# Patient Record
Sex: MALE | Race: WHITE | Employment: OTHER | URBAN - METROPOLITAN AREA
[De-identification: names, ages, dates, MRNs, and addresses within clinical notes are randomized per-mention and may not be internally consistent; named-entity substitution may affect disease eponyms.]

---

## 2017-01-19 DIAGNOSIS — I10 ESSENTIAL HYPERTENSION: ICD-10-CM

## 2017-01-19 RX ORDER — LISINOPRIL AND HYDROCHLOROTHIAZIDE 20; 12.5 MG/1; MG/1
1 TABLET ORAL DAILY
Qty: 90 TABLET | Refills: 1 | Status: SHIPPED | OUTPATIENT
Start: 2017-01-19 | End: 2017-03-28 | Stop reason: SDUPTHER

## 2017-02-13 ENCOUNTER — HOSPITAL ENCOUNTER (OUTPATIENT)
Age: 67
Discharge: HOME | End: 2017-02-13
Payer: COMMERCIAL

## 2017-02-13 PROCEDURE — 36415 COLL VENOUS BLD VENIPUNCTURE: CPT

## 2017-02-13 PROCEDURE — 84154 ASSAY OF PSA FREE: CPT

## 2017-02-14 LAB
PROSTATE SPECIFIC ANTIGEN FREE: 0.8 UG/L
PROSTATE SPECIFIC ANTIGEN PERCENT FREE: 15.4 %
PROSTATE SPECIFIC ANTIGEN: 5.2 UG/L (ref 0–4)

## 2017-02-17 ENCOUNTER — OFFICE VISIT (OUTPATIENT)
Dept: UROLOGY | Facility: CLINIC | Age: 67
End: 2017-02-17

## 2017-02-17 VITALS
HEART RATE: 95 BPM | TEMPERATURE: 98.2 F | BODY MASS INDEX: 29.65 KG/M2 | SYSTOLIC BLOOD PRESSURE: 169 MMHG | WEIGHT: 200.2 LBS | HEIGHT: 69 IN | DIASTOLIC BLOOD PRESSURE: 94 MMHG

## 2017-02-17 DIAGNOSIS — R97.20 BPH WITH ELEVATED PSA: ICD-10-CM

## 2017-02-17 DIAGNOSIS — N40.0 BPH WITH ELEVATED PSA: ICD-10-CM

## 2017-02-17 DIAGNOSIS — N52.9 ERECTILE DYSFUNCTION, UNSPECIFIED ERECTILE DYSFUNCTION TYPE: ICD-10-CM

## 2017-02-17 DIAGNOSIS — R97.20 ELEVATED PSA: Primary | ICD-10-CM

## 2017-02-17 PROCEDURE — 99214 OFFICE O/P EST MOD 30 MIN: CPT | Performed by: UROLOGY

## 2017-02-17 PROCEDURE — G8484 FLU IMMUNIZE NO ADMIN: HCPCS | Performed by: UROLOGY

## 2017-02-17 PROCEDURE — G8419 CALC BMI OUT NRM PARAM NOF/U: HCPCS | Performed by: UROLOGY

## 2017-02-17 PROCEDURE — 3017F COLORECTAL CA SCREEN DOC REV: CPT | Performed by: UROLOGY

## 2017-02-17 PROCEDURE — 1036F TOBACCO NON-USER: CPT | Performed by: UROLOGY

## 2017-02-17 PROCEDURE — G8427 DOCREV CUR MEDS BY ELIG CLIN: HCPCS | Performed by: UROLOGY

## 2017-02-17 ASSESSMENT — ENCOUNTER SYMPTOMS
COUGH: 0
ABDOMINAL PAIN: 0
COLOR CHANGE: 0
NAUSEA: 0
EYE REDNESS: 0
WHEEZING: 0
EYE PAIN: 0
BACK PAIN: 0
VOMITING: 0
SHORTNESS OF BREATH: 0

## 2017-03-28 ENCOUNTER — OFFICE VISIT (OUTPATIENT)
Dept: FAMILY MEDICINE CLINIC | Age: 67
End: 2017-03-28
Payer: COMMERCIAL

## 2017-03-28 VITALS
RESPIRATION RATE: 22 BRPM | WEIGHT: 194.2 LBS | TEMPERATURE: 98.2 F | BODY MASS INDEX: 28.68 KG/M2 | DIASTOLIC BLOOD PRESSURE: 86 MMHG | SYSTOLIC BLOOD PRESSURE: 136 MMHG | OXYGEN SATURATION: 98 % | HEART RATE: 68 BPM

## 2017-03-28 DIAGNOSIS — Z12.11 SCREENING FOR COLON CANCER: ICD-10-CM

## 2017-03-28 DIAGNOSIS — Z12.5 SCREENING FOR PROSTATE CANCER: ICD-10-CM

## 2017-03-28 DIAGNOSIS — I10 ESSENTIAL HYPERTENSION: Primary | ICD-10-CM

## 2017-03-28 DIAGNOSIS — Z13.220 SCREENING FOR LIPOID DISORDERS: ICD-10-CM

## 2017-03-28 PROCEDURE — G8427 DOCREV CUR MEDS BY ELIG CLIN: HCPCS | Performed by: NURSE PRACTITIONER

## 2017-03-28 PROCEDURE — 3017F COLORECTAL CA SCREEN DOC REV: CPT | Performed by: NURSE PRACTITIONER

## 2017-03-28 PROCEDURE — G8484 FLU IMMUNIZE NO ADMIN: HCPCS | Performed by: NURSE PRACTITIONER

## 2017-03-28 PROCEDURE — 1036F TOBACCO NON-USER: CPT | Performed by: NURSE PRACTITIONER

## 2017-03-28 PROCEDURE — G8419 CALC BMI OUT NRM PARAM NOF/U: HCPCS | Performed by: NURSE PRACTITIONER

## 2017-03-28 PROCEDURE — 99213 OFFICE O/P EST LOW 20 MIN: CPT | Performed by: NURSE PRACTITIONER

## 2017-03-28 RX ORDER — INGENOL MEBUTATE 150 UG/G
GEL TOPICAL
COMMUNITY
Start: 2017-03-14 | End: 2019-07-16

## 2017-03-28 RX ORDER — LISINOPRIL AND HYDROCHLOROTHIAZIDE 20; 12.5 MG/1; MG/1
1 TABLET ORAL DAILY
Qty: 90 TABLET | Refills: 1 | Status: SHIPPED | OUTPATIENT
Start: 2017-03-28 | End: 2017-10-18 | Stop reason: SDUPTHER

## 2017-03-28 ASSESSMENT — PATIENT HEALTH QUESTIONNAIRE - PHQ9
1. LITTLE INTEREST OR PLEASURE IN DOING THINGS: 0
SUM OF ALL RESPONSES TO PHQ9 QUESTIONS 1 & 2: 0
SUM OF ALL RESPONSES TO PHQ QUESTIONS 1-9: 0
2. FEELING DOWN, DEPRESSED OR HOPELESS: 0

## 2017-03-30 DIAGNOSIS — Z12.11 SCREENING FOR COLON CANCER: ICD-10-CM

## 2017-03-30 LAB
CONTROL: POSITIVE
HEMOCCULT STL QL: NORMAL

## 2017-03-30 PROCEDURE — 82274 ASSAY TEST FOR BLOOD FECAL: CPT | Performed by: NURSE PRACTITIONER

## 2017-04-01 ASSESSMENT — ENCOUNTER SYMPTOMS
NAUSEA: 0
SORE THROAT: 0
ABDOMINAL PAIN: 0
WHEEZING: 0
SHORTNESS OF BREATH: 0
ABDOMINAL DISTENTION: 0
EYE ITCHING: 0
CONSTIPATION: 0
CHEST TIGHTNESS: 0
COLOR CHANGE: 0
SINUS PRESSURE: 0
DIARRHEA: 0
EYE PAIN: 0
COUGH: 0
BLOOD IN STOOL: 0

## 2017-04-03 ENCOUNTER — HOSPITAL ENCOUNTER (OUTPATIENT)
Age: 67
Discharge: HOME | End: 2017-04-03
Payer: COMMERCIAL

## 2017-04-03 DIAGNOSIS — Z12.5 SCREENING FOR PROSTATE CANCER: ICD-10-CM

## 2017-04-03 DIAGNOSIS — I10 ESSENTIAL HYPERTENSION: ICD-10-CM

## 2017-04-03 DIAGNOSIS — Z13.220 SCREENING FOR LIPOID DISORDERS: ICD-10-CM

## 2017-04-03 LAB
ALBUMIN SERPL-MCNC: 4.1 G/DL (ref 3.5–5.2)
ALBUMIN/GLOBULIN RATIO: ABNORMAL (ref 1–2.5)
ALP BLD-CCNC: 57 U/L (ref 40–129)
ALT SERPL-CCNC: 24 U/L (ref 5–41)
ANION GAP SERPL CALCULATED.3IONS-SCNC: 12 MMOL/L (ref 9–17)
AST SERPL-CCNC: 21 U/L
BILIRUB SERPL-MCNC: 0.24 MG/DL (ref 0.3–1.2)
BUN BLDV-MCNC: 19 MG/DL (ref 8–23)
BUN/CREAT BLD: ABNORMAL (ref 9–20)
CALCIUM SERPL-MCNC: 9.1 MG/DL (ref 8.6–10.4)
CHLORIDE BLD-SCNC: 100 MMOL/L (ref 98–107)
CHOLESTEROL/HDL RATIO: 4.2
CHOLESTEROL: 191 MG/DL
CO2: 27 MMOL/L (ref 20–31)
CREAT SERPL-MCNC: 0.93 MG/DL (ref 0.7–1.2)
GFR AFRICAN AMERICAN: >60 ML/MIN
GFR NON-AFRICAN AMERICAN: >60 ML/MIN
GFR SERPL CREATININE-BSD FRML MDRD: ABNORMAL ML/MIN/{1.73_M2}
GFR SERPL CREATININE-BSD FRML MDRD: ABNORMAL ML/MIN/{1.73_M2}
GLUCOSE BLD-MCNC: 126 MG/DL (ref 70–99)
HDLC SERPL-MCNC: 46 MG/DL
LDL CHOLESTEROL: 133 MG/DL (ref 0–130)
POTASSIUM SERPL-SCNC: 4.2 MMOL/L (ref 3.7–5.3)
PROSTATE SPECIFIC ANTIGEN: 7.3 UG/L
SODIUM BLD-SCNC: 139 MMOL/L (ref 135–144)
TOTAL PROTEIN: 6.9 G/DL (ref 6.4–8.3)
TRIGL SERPL-MCNC: 61 MG/DL
VLDLC SERPL CALC-MCNC: ABNORMAL MG/DL (ref 1–30)

## 2017-04-03 PROCEDURE — 80053 COMPREHEN METABOLIC PANEL: CPT

## 2017-04-03 PROCEDURE — G0103 PSA SCREENING: HCPCS

## 2017-04-03 PROCEDURE — 80061 LIPID PANEL: CPT

## 2017-04-12 ENCOUNTER — NURSE ONLY (OUTPATIENT)
Dept: FAMILY MEDICINE CLINIC | Age: 67
End: 2017-04-12

## 2017-04-12 DIAGNOSIS — R73.01 ELEVATED FASTING GLUCOSE: Primary | ICD-10-CM

## 2017-04-12 LAB — HBA1C MFR BLD: 5.6 %

## 2017-06-26 ENCOUNTER — TELEPHONE (OUTPATIENT)
Dept: UROLOGY | Age: 67
End: 2017-06-26

## 2017-08-11 ENCOUNTER — TELEPHONE (OUTPATIENT)
Dept: UROLOGY | Age: 67
End: 2017-08-11

## 2017-08-11 ENCOUNTER — HOSPITAL ENCOUNTER (OUTPATIENT)
Age: 67
Discharge: HOME | End: 2017-08-11
Payer: COMMERCIAL

## 2017-08-11 DIAGNOSIS — R97.20 ELEVATED PSA: ICD-10-CM

## 2017-08-11 PROCEDURE — 36415 COLL VENOUS BLD VENIPUNCTURE: CPT

## 2017-08-11 PROCEDURE — 84154 ASSAY OF PSA FREE: CPT

## 2017-08-14 LAB
PROSTATE SPECIFIC ANTIGEN FREE: 0.6 UG/L
PROSTATE SPECIFIC ANTIGEN PERCENT FREE: 13.6 %
PROSTATE SPECIFIC ANTIGEN: 4.4 UG/L (ref 0–4)

## 2017-08-25 ENCOUNTER — OFFICE VISIT (OUTPATIENT)
Dept: UROLOGY | Age: 67
End: 2017-08-25
Payer: MEDICARE

## 2017-08-25 VITALS
HEART RATE: 60 BPM | BODY MASS INDEX: 26.22 KG/M2 | DIASTOLIC BLOOD PRESSURE: 83 MMHG | HEIGHT: 69 IN | SYSTOLIC BLOOD PRESSURE: 172 MMHG | WEIGHT: 177 LBS | TEMPERATURE: 98 F

## 2017-08-25 DIAGNOSIS — N40.1 HYPERTROPHY OF PROSTATE WITH URINARY OBSTRUCTION: ICD-10-CM

## 2017-08-25 DIAGNOSIS — N13.8 HYPERTROPHY OF PROSTATE WITH URINARY OBSTRUCTION: ICD-10-CM

## 2017-08-25 DIAGNOSIS — R97.20 ELEVATED PSA: Primary | ICD-10-CM

## 2017-08-25 PROCEDURE — G8427 DOCREV CUR MEDS BY ELIG CLIN: HCPCS | Performed by: UROLOGY

## 2017-08-25 PROCEDURE — 4040F PNEUMOC VAC/ADMIN/RCVD: CPT | Performed by: UROLOGY

## 2017-08-25 PROCEDURE — 3017F COLORECTAL CA SCREEN DOC REV: CPT | Performed by: UROLOGY

## 2017-08-25 PROCEDURE — 1036F TOBACCO NON-USER: CPT | Performed by: UROLOGY

## 2017-08-25 PROCEDURE — 99214 OFFICE O/P EST MOD 30 MIN: CPT | Performed by: UROLOGY

## 2017-08-25 PROCEDURE — G8419 CALC BMI OUT NRM PARAM NOF/U: HCPCS | Performed by: UROLOGY

## 2017-08-25 PROCEDURE — 1123F ACP DISCUSS/DSCN MKR DOCD: CPT | Performed by: UROLOGY

## 2017-08-25 RX ORDER — TAMSULOSIN HYDROCHLORIDE 0.4 MG/1
0.4 CAPSULE ORAL DAILY
Qty: 180 CAPSULE | Refills: 3 | Status: SHIPPED | OUTPATIENT
Start: 2017-08-25 | End: 2017-09-06 | Stop reason: SDUPTHER

## 2017-08-25 ASSESSMENT — ENCOUNTER SYMPTOMS
EYE PAIN: 0
BACK PAIN: 0
COUGH: 0
WHEEZING: 0
NAUSEA: 0
EYE REDNESS: 0
VOMITING: 0
COLOR CHANGE: 0
ABDOMINAL PAIN: 0
SHORTNESS OF BREATH: 0

## 2017-08-28 ENCOUNTER — OFFICE VISIT (OUTPATIENT)
Dept: FAMILY MEDICINE CLINIC | Age: 67
End: 2017-08-28
Payer: MEDICARE

## 2017-08-28 VITALS
HEART RATE: 58 BPM | BODY MASS INDEX: 27.19 KG/M2 | TEMPERATURE: 98.5 F | WEIGHT: 183.6 LBS | RESPIRATION RATE: 18 BRPM | DIASTOLIC BLOOD PRESSURE: 84 MMHG | SYSTOLIC BLOOD PRESSURE: 128 MMHG | HEIGHT: 69 IN | OXYGEN SATURATION: 97 %

## 2017-08-28 DIAGNOSIS — I10 ESSENTIAL HYPERTENSION: Primary | ICD-10-CM

## 2017-08-28 PROCEDURE — G8427 DOCREV CUR MEDS BY ELIG CLIN: HCPCS | Performed by: NURSE PRACTITIONER

## 2017-08-28 PROCEDURE — 4040F PNEUMOC VAC/ADMIN/RCVD: CPT | Performed by: NURSE PRACTITIONER

## 2017-08-28 PROCEDURE — G8419 CALC BMI OUT NRM PARAM NOF/U: HCPCS | Performed by: NURSE PRACTITIONER

## 2017-08-28 PROCEDURE — 3017F COLORECTAL CA SCREEN DOC REV: CPT | Performed by: NURSE PRACTITIONER

## 2017-08-28 PROCEDURE — 1123F ACP DISCUSS/DSCN MKR DOCD: CPT | Performed by: NURSE PRACTITIONER

## 2017-08-28 PROCEDURE — 99213 OFFICE O/P EST LOW 20 MIN: CPT | Performed by: NURSE PRACTITIONER

## 2017-08-28 PROCEDURE — 1036F TOBACCO NON-USER: CPT | Performed by: NURSE PRACTITIONER

## 2017-08-28 ASSESSMENT — ENCOUNTER SYMPTOMS
SINUS PRESSURE: 0
BLOOD IN STOOL: 0
WHEEZING: 0
ABDOMINAL DISTENTION: 0
NAUSEA: 0
SORE THROAT: 0
SHORTNESS OF BREATH: 0
EYE ITCHING: 0
DIARRHEA: 0
COLOR CHANGE: 0
CONSTIPATION: 0
EYE PAIN: 0
CHEST TIGHTNESS: 0
COUGH: 0
ABDOMINAL PAIN: 0

## 2017-08-28 ASSESSMENT — PATIENT HEALTH QUESTIONNAIRE - PHQ9
SUM OF ALL RESPONSES TO PHQ9 QUESTIONS 1 & 2: 0
SUM OF ALL RESPONSES TO PHQ QUESTIONS 1-9: 0
1. LITTLE INTEREST OR PLEASURE IN DOING THINGS: 0
2. FEELING DOWN, DEPRESSED OR HOPELESS: 0

## 2017-09-06 DIAGNOSIS — N40.1 BENIGN NON-NODULAR PROSTATIC HYPERPLASIA WITH LOWER URINARY TRACT SYMPTOMS: ICD-10-CM

## 2017-09-06 DIAGNOSIS — N13.8 HYPERTROPHY OF PROSTATE WITH URINARY OBSTRUCTION: ICD-10-CM

## 2017-09-06 DIAGNOSIS — N40.1 HYPERTROPHY OF PROSTATE WITH URINARY OBSTRUCTION: ICD-10-CM

## 2017-09-06 DIAGNOSIS — R35.1 NOCTURIA: ICD-10-CM

## 2017-09-06 RX ORDER — TAMSULOSIN HYDROCHLORIDE 0.4 MG/1
CAPSULE ORAL
Qty: 180 CAPSULE | Refills: 3 | Status: SHIPPED | OUTPATIENT
Start: 2017-09-06 | End: 2018-09-27 | Stop reason: SDUPTHER

## 2017-10-18 DIAGNOSIS — I10 ESSENTIAL HYPERTENSION: ICD-10-CM

## 2017-10-18 RX ORDER — LISINOPRIL AND HYDROCHLOROTHIAZIDE 20; 12.5 MG/1; MG/1
TABLET ORAL
Qty: 90 TABLET | Refills: 1 | Status: SHIPPED | OUTPATIENT
Start: 2017-10-18 | End: 2018-06-26 | Stop reason: SDUPTHER

## 2017-10-27 ENCOUNTER — OFFICE VISIT (OUTPATIENT)
Dept: FAMILY MEDICINE CLINIC | Age: 67
End: 2017-10-27
Payer: MEDICARE

## 2017-10-27 VITALS
HEIGHT: 69 IN | TEMPERATURE: 98.5 F | HEART RATE: 54 BPM | OXYGEN SATURATION: 98 % | SYSTOLIC BLOOD PRESSURE: 124 MMHG | BODY MASS INDEX: 27.28 KG/M2 | RESPIRATION RATE: 20 BRPM | DIASTOLIC BLOOD PRESSURE: 78 MMHG | WEIGHT: 184.2 LBS

## 2017-10-27 DIAGNOSIS — N52.9 ERECTILE DYSFUNCTION, UNSPECIFIED ERECTILE DYSFUNCTION TYPE: ICD-10-CM

## 2017-10-27 DIAGNOSIS — M79.601 ARM PAIN, RIGHT: Primary | ICD-10-CM

## 2017-10-27 PROCEDURE — 1123F ACP DISCUSS/DSCN MKR DOCD: CPT | Performed by: NURSE PRACTITIONER

## 2017-10-27 PROCEDURE — 99213 OFFICE O/P EST LOW 20 MIN: CPT | Performed by: NURSE PRACTITIONER

## 2017-10-27 PROCEDURE — 1036F TOBACCO NON-USER: CPT | Performed by: NURSE PRACTITIONER

## 2017-10-27 PROCEDURE — G8427 DOCREV CUR MEDS BY ELIG CLIN: HCPCS | Performed by: NURSE PRACTITIONER

## 2017-10-27 PROCEDURE — G8484 FLU IMMUNIZE NO ADMIN: HCPCS | Performed by: NURSE PRACTITIONER

## 2017-10-27 PROCEDURE — 3017F COLORECTAL CA SCREEN DOC REV: CPT | Performed by: NURSE PRACTITIONER

## 2017-10-27 PROCEDURE — 4040F PNEUMOC VAC/ADMIN/RCVD: CPT | Performed by: NURSE PRACTITIONER

## 2017-10-27 PROCEDURE — G8419 CALC BMI OUT NRM PARAM NOF/U: HCPCS | Performed by: NURSE PRACTITIONER

## 2017-10-27 RX ORDER — SILDENAFIL 100 MG/1
100 TABLET, FILM COATED ORAL PRN
Qty: 10 TABLET | Refills: 3 | Status: SHIPPED | OUTPATIENT
Start: 2017-10-27 | End: 2018-09-25 | Stop reason: SDUPTHER

## 2017-10-27 NOTE — PROGRESS NOTES
Visit Information    Have you changed or started any medications since your last visit including any over-the-counter medicines, vitamins, or herbal medicines? no   Are you having any side effects from any of your medications? -  no  Have you stopped taking any of your medications? Is so, why? -  no    Have you seen any other physician or provider since your last visit? No  Have you had any other diagnostic tests since your last visit? No  Have you been seen in the emergency room and/or had an admission to a hospital since we last saw you? No  Have you had your routine dental cleaning in the past 6 months? yes     Have you activated your [employer] account? If not, what are your barriers?  Yes     Patient Care Team:  Gal Valdez CNP as PCP - General        Health Maintenance   Topic Date Due    DTaP/Tdap/Td vaccine (1 - Tdap) 06/13/1971    Zostavax vaccine  06/13/2012    Pneumococcal low/med risk (1 of 2 - PCV13) 06/13/2017    Hepatitis C screen  08/28/2018 (Originally 1950)    HIV screen  08/28/2018 (Originally 6/13/1967)    Colon Cancer Screen FIT/FOBT  03/30/2018    Diabetes screen  04/12/2020    Lipid screen  04/03/2022

## 2017-10-29 ASSESSMENT — ENCOUNTER SYMPTOMS
BLOOD IN STOOL: 0
SHORTNESS OF BREATH: 0
COUGH: 0
WHEEZING: 0
ABDOMINAL PAIN: 0
EYE ITCHING: 0
COLOR CHANGE: 0
EYE PAIN: 0
DIARRHEA: 0
ABDOMINAL DISTENTION: 0
CHEST TIGHTNESS: 0
NAUSEA: 0
SORE THROAT: 0
CONSTIPATION: 0
SINUS PRESSURE: 0

## 2017-10-30 NOTE — PROGRESS NOTES
[facility], [address]  [address] [address]  Dept: [phone]  Dept Fax: [phone]    Isis Limon is a 74 y.o. male who presents today for his medical conditions/complaints as noted below. Isis Seen is c/o of Arm Pain (weakness in the right forearm. patient was moving a fridge on 10/26/2017 and felt a snap and pop in his right arm  )    Clark received counseling on the following healthy behaviors: nutrition, exercise and medication adherence  Reviewed prior labs and health maintenance. Continue current medications, diet and exercise. Discussed use, benefit, and side effects of prescribed medications. Barriers to medication compliance addressed. Patient given educational materials - see patient instructions. All patient questions answered. Patient voiced understanding. HPI:     Arm Pain    The incident occurred 2 days ago. The incident occurred at home. Injury mechanism: moving a refrigerator and felt a \"pop\" and pain. The pain is present in the right forearm. The quality of the pain is described as aching. The pain does not radiate. The pain is at a severity of 4/10. The pain is moderate. The pain has been constant since the incident. Pertinent negatives include no chest pain, muscle weakness, numbness or tingling. The symptoms are aggravated by movement and lifting. He has tried rest, ice and elevation for the symptoms. The treatment provided mild relief. Erectile Dysfunction   This is a chronic problem. The current episode started more than 1 month ago. The problem is unchanged. The nature of his difficulty is achieving erection and maintaining erection. He reports no anxiety, decreased libido or performance anxiety. He reports his erection duration to be less than 1 minute. Irritative symptoms do not include frequency, nocturia or urgency.  Obstructive symptoms do not include incomplete emptying or a weak stream. Pertinent negatives include no dysuria or genital pain. The symptoms are aggravated by medications. Past treatments include sildenafil. The treatment provided significant relief. He has been using treatment for 1 to 6 months. He has had no adverse reactions caused by medications. There are no known risk factors.        Hemoglobin A1C (%)   Date Value   04/12/2017 5.6             ( goal A1C is < 7)   No results found for: LABMICR  LDL Cholesterol (mg/dL)   Date Value   04/03/2017 133 (H)   11/03/2015 151 (H)   01/23/2013 155 (H)       (goal LDL is <100)   AST (U/L)   Date Value   04/03/2017 21     ALT (U/L)   Date Value   04/03/2017 24     BUN (mg/dL)   Date Value   04/03/2017 19     BP Readings from Last 3 Encounters:   10/27/17 124/78   08/28/17 128/84   08/25/17 (!) 172/83          (goal 120/80)    Past Medical History:   Diagnosis Date    Basal cell carcinoma     Erectile dysfunction     Hypertension     Precancerous skin lesion     Squamous cell carcinoma       Past Surgical History:   Procedure Laterality Date    MALIGNANT SKIN LESION EXCISION      MOHS SURGERY      PRE-MALIGNANT / BENIGN SKIN LESION EXCISION         Family History   Problem Relation Age of Onset    High Blood Pressure Mother     Diabetes Neg Hx        Social History   Substance Use Topics    Smoking status: Never Smoker    Smokeless tobacco: Never Used    Alcohol use 0.0 oz/week      Comment: socially      Current Outpatient Prescriptions   Medication Sig Dispense Refill    sildenafil (VIAGRA) 100 MG tablet Take 1 tablet by mouth as needed for Erectile Dysfunction 10 tablet 3    lisinopril-hydrochlorothiazide (PRINZIDE;ZESTORETIC) 20-12.5 MG per tablet TAKE 1 TABLET DAILY 90 tablet 1    tamsulosin (FLOMAX) 0.4 MG capsule Take 2 tablets daily 180 capsule 3    PICATO 0.015 % GEL       vitamin B-12 (CYANOCOBALAMIN) 1000 MCG tablet Take 1,000 mcg by mouth daily      Psyllium (METAMUCIL PO) Take by mouth      Bioflavonoid Products (KATHRYN-C) 500-200-60 MG TABS Take 1 tablet by mouth daily      Coenzyme Q10 (CO Q 10) 100 MG CAPS Take 1 capsule by mouth daily       No current facility-administered medications for this visit. Allergies   Allergen Reactions    Influenza Vaccines Swelling       Health Maintenance   Topic Date Due    DTaP/Tdap/Td vaccine (1 - Tdap) 06/13/1971    Zostavax vaccine  06/13/2012    Pneumococcal low/med risk (1 of 2 - PCV13) 06/13/2017    Hepatitis C screen  08/28/2018 (Originally 1950)    HIV screen  08/28/2018 (Originally 6/13/1967)    Colon Cancer Screen FIT/FOBT  03/30/2018    Diabetes screen  04/12/2020    Lipid screen  04/03/2022       Subjective:      Review of Systems   Constitutional: Negative for activity change and appetite change. HENT: Negative for congestion, ear pain, hearing loss, sinus pressure, sore throat and tinnitus. Eyes: Negative for pain, itching and visual disturbance. Respiratory: Negative for cough, chest tightness, shortness of breath and wheezing. Cardiovascular: Negative for chest pain, palpitations and leg swelling. Gastrointestinal: Negative for abdominal distention, abdominal pain, blood in stool, constipation, diarrhea and nausea. Endocrine: Negative for cold intolerance, heat intolerance, polydipsia, polyphagia and polyuria. Genitourinary: Negative for decreased libido, dysuria, flank pain, frequency, incomplete emptying, nocturia and urgency. Musculoskeletal: Positive for arthralgias (right arm pain). Negative for gait problem and myalgias. Skin: Negative for color change, rash and wound. Allergic/Immunologic: Negative for environmental allergies and food allergies. Neurological: Negative for tingling, speech difficulty, weakness, light-headedness, numbness and headaches. Hematological: Does not bruise/bleed easily. Psychiatric/Behavioral: Negative for decreased concentration and sleep disturbance. The patient is not nervous/anxious. Objective:     /78 (Site: Left Arm, Position: Sitting, Cuff Size: Medium Adult)   Pulse 54   Temp 98.5 °F (36.9 °C) (Temporal)   Resp 20   Ht 5' 9\" (1.753 m)   Wt 184 lb 3.2 oz (83.6 kg)   SpO2 98%   BMI 27.20 kg/m²   Physical Exam   Constitutional: He is oriented to person, place, and time. He appears well-developed and well-nourished. HENT:   Head: Normocephalic. Right Ear: External ear normal.   Left Ear: External ear normal.   Nose: Nose normal.   Mouth/Throat: Oropharynx is clear and moist.   Eyes: Conjunctivae and EOM are normal.   Neck: Normal range of motion. Neck supple. No thyromegaly present. Cardiovascular: Normal rate, regular rhythm and normal heart sounds. Exam reveals no gallop and no friction rub. No murmur heard. Pulmonary/Chest: Effort normal and breath sounds normal. No respiratory distress. He has no wheezes. He has no rales. He exhibits no tenderness. Abdominal: Soft. Bowel sounds are normal. He exhibits no distension. There is no splenomegaly or hepatomegaly. There is no tenderness. No hernia. Musculoskeletal: Normal range of motion. He exhibits no edema. Right forearm: He exhibits tenderness. Lymphadenopathy:     He has no cervical adenopathy. Neurological: He is alert and oriented to person, place, and time. Coordination and gait normal.   Skin: Skin is warm and dry. Bruising (right inner forearm) noted. No rash noted. No erythema. Psychiatric: He has a normal mood and affect. His speech is normal and behavior is normal. Judgment and thought content normal. Cognition and memory are normal.   Nursing note and vitals reviewed. Assessment:      1. Arm pain, right    2. Erectile dysfunction, unspecified erectile dysfunction type                     Plan:    Right arm pain:  Apply ice to right arm several times a day for 10-15 minutes at a time  Rest arm, do not overuse  Elevate arm on pillows when sitting. No heavy lifting.   If not better in

## 2018-02-21 ENCOUNTER — HOSPITAL ENCOUNTER (OUTPATIENT)
Age: 68
Discharge: HOME | End: 2018-02-21
Payer: MEDICARE

## 2018-02-21 LAB — PROSTATE SPECIFIC ANTIGEN: 6.74 UG/L

## 2018-02-21 PROCEDURE — 36415 COLL VENOUS BLD VENIPUNCTURE: CPT

## 2018-02-21 PROCEDURE — 84153 ASSAY OF PSA TOTAL: CPT

## 2018-02-27 ENCOUNTER — OFFICE VISIT (OUTPATIENT)
Dept: UROLOGY | Age: 68
End: 2018-02-27
Payer: MEDICARE

## 2018-02-27 VITALS
TEMPERATURE: 98 F | HEIGHT: 69 IN | DIASTOLIC BLOOD PRESSURE: 82 MMHG | SYSTOLIC BLOOD PRESSURE: 167 MMHG | WEIGHT: 189 LBS | BODY MASS INDEX: 27.99 KG/M2 | HEART RATE: 67 BPM

## 2018-02-27 DIAGNOSIS — N52.01 ERECTILE DYSFUNCTION DUE TO ARTERIAL INSUFFICIENCY: ICD-10-CM

## 2018-02-27 DIAGNOSIS — N40.1 BPH WITH OBSTRUCTION/LOWER URINARY TRACT SYMPTOMS: ICD-10-CM

## 2018-02-27 DIAGNOSIS — R97.20 ELEVATED PSA: Primary | ICD-10-CM

## 2018-02-27 DIAGNOSIS — N13.8 BPH WITH OBSTRUCTION/LOWER URINARY TRACT SYMPTOMS: ICD-10-CM

## 2018-02-27 PROCEDURE — 99214 OFFICE O/P EST MOD 30 MIN: CPT | Performed by: UROLOGY

## 2018-02-27 RX ORDER — TADALAFIL 20 MG/1
20 TABLET ORAL PRN
Qty: 6 TABLET | Refills: 3 | Status: SHIPPED | OUTPATIENT
Start: 2018-02-27 | End: 2018-09-25 | Stop reason: ALTCHOICE

## 2018-02-27 RX ORDER — TADALAFIL 20 MG/1
20 TABLET ORAL PRN
Qty: 6 TABLET | Refills: 3 | Status: SHIPPED | OUTPATIENT
Start: 2018-02-27 | End: 2018-02-27 | Stop reason: SDUPTHER

## 2018-02-27 ASSESSMENT — ENCOUNTER SYMPTOMS
COLOR CHANGE: 0
VOMITING: 0
SHORTNESS OF BREATH: 0
NAUSEA: 0
BACK PAIN: 0
COUGH: 0
WHEEZING: 0
EYE REDNESS: 0
EYE PAIN: 0
ABDOMINAL PAIN: 0

## 2018-02-27 NOTE — LETTER
[facility]  [facility] [place]  [address]  [address]  [address] [place] [address]  Dept: [phone]  Dept Fax: [phone]        2/27/18    Patient: Lauretta Oppenheim  YOB: 1950    Dear Juan C Qucah CNP,    I had the pleasure of seeing one of your patients, Elise Wren today in the office today. Below are the relevant portions of my assessment and plan of care. IMPRESSION:     1. Elevated PSA    2. BPH with obstruction/lower urinary tract symptoms        PLAN:        He is interested in an MRI. He needs that or a biopsy. Flomax BID is not working, he may need a cysto for Bladder outlet surgery  Return in about 6 weeks (around 4/10/2018). Prescriptions Ordered:  No orders of the defined types were placed in this encounter. Orders Placed:  Orders Placed This Encounter   Procedures    MRI PROSTATE W WO CONTRAST     Standing Status:   Future     Standing Expiration Date:   2/27/2019       Thank you for allowing me to participate in the care of this patient. I will keep you updated on this patient's follow up and I look forward to serving you and your patients again in the future.         Jak Galicia MD

## 2018-02-27 NOTE — PROGRESS NOTES
[facility]  [facility] [place]  [address]  [address]  [address] [facility] [address]  Dept: [address] [facility] Urology Office Note - Established    Patient:  Jose Alejandro Scott  YOB: 1950  Date: 2/27/2018    The patient is a 74 y.o. male who presents today for evaluation of the following problems:   Chief Complaint   Patient presents with    Elevated PSA     PSA 6 mo        HPI  Here for follow up on PSA. His BP is elevated today, he feels it is white coat syndrome- home /79. He has no known Hx of prostate cancer, but does have biological Hx on his father. He has no recent instrumentation, cath or colonoscopy. He has a weak stream, nocturia 2x, his urinary s/s have worsened in the last 6 months. He takes Flomax 2x per day, and noticed at first the second pill did help but now his s/s are back to baseline or worse. His PSA in Aug 0f 2017 was 4.4, his PSA 2/18 was 6.7. HIs PSA in 4/2017 7.30. We had discussed biopsy previously, but he was not interested. His PSA had gone down, and has now gone up again. Summary of old records: N/A    Additional History: N/A    Procedures Today: N/A    Urinalysis today:  No results found for this visit on 02/27/18. Last several PSA's:  Lab Results   Component Value Date    PSA 6.74 (H) 02/21/2018    PSA 4.4 (H) 08/11/2017    PSA 7.30 (H) 04/03/2017     Last total testosterone:  Lab Results   Component Value Date    TESTOSTERONE 437 09/18/2013       AUA Symptom Score (2/27/2018):   INCOMPLETE EMPTYING: How often have you had the sensation of not emptying your bladder?: Not at all  FREQUENCY: How often do you have to urinate less than every two hours?: Less than 1 to 5 times  INTERMITTENCY: How often have you found you stopped and started again several times when you urinated?: Not at all  URGENCY: How often have you found it difficult to postpone urination?: Not at all  WEAK STREAM: How often have you had a weak urinary stream?: Smokeless Tobacco    Never Used     (If patient a smoker, smoking cessation counseling offered)    History   Alcohol Use    0.0 oz/week     Comment: socially       REVIEW OF SYSTEMS:  Review of Systems   Constitutional: Negative for appetite change, chills and fever. Eyes: Negative for pain, redness and visual disturbance. Respiratory: Negative for cough, shortness of breath and wheezing. Cardiovascular: Negative for chest pain and leg swelling. Gastrointestinal: Negative for abdominal pain, nausea and vomiting. Genitourinary: Positive for difficulty urinating. Negative for discharge, dysuria, flank pain, frequency, hematuria, scrotal swelling, testicular pain and urgency. Musculoskeletal: Negative for back pain, joint swelling and myalgias. Skin: Negative for color change, rash and wound. Neurological: Negative for dizziness, tremors and numbness. Hematological: Negative for adenopathy. Does not bruise/bleed easily. Physical Exam:      Vitals:    02/27/18 0852   BP: (!) 167/82   Pulse:    Temp:      Body mass index is 27.91 kg/m². Patient is a 74 y.o. male in no acute distress and alert and oriented to person, place and time. Physical Exam  Constitutional: Patient in no acute distress. Neuro: Alert and oriented to person, place and time. Psych: Mood normal, affect normal  Skin: No rash noted  HEENT: Head: Normocephalic and atraumatic  Lungs: Respiratory effort is normal  Cardiovascular: Warm & Pink  Abdomen: Soft, non-tender, non-distended with no CVA,  No flank tenderness,  Or hepatosplenomegaly   Lymphatics: No palpable lymphadenopathy. Bladder non-tender and not distended. Musculoskeletal: Normal gait and station  Testiscles: Normal, bilaterally      Assessment and Plan      1. Elevated PSA    2. BPH with obstruction/lower urinary tract symptoms       PSA has worsened. Plan:         He is interested in an MRI. He needs that or a biopsy.    Flomax BID is not working, he may need a cysto for Bladder outlet surgery  Return in about 6 weeks (around 4/10/2018). Prescriptions Ordered:  No orders of the defined types were placed in this encounter.      Orders Placed:  Orders Placed This Encounter   Procedures    MRI PROSTATE W WO CONTRAST     Standing Status:   Future     Standing Expiration Date:   2/27/2019          Leonardo Blanco MD

## 2018-02-28 ENCOUNTER — OFFICE VISIT (OUTPATIENT)
Dept: FAMILY MEDICINE CLINIC | Age: 68
End: 2018-02-28
Payer: MEDICARE

## 2018-02-28 VITALS
HEIGHT: 69 IN | WEIGHT: 184.4 LBS | DIASTOLIC BLOOD PRESSURE: 76 MMHG | OXYGEN SATURATION: 97 % | HEART RATE: 62 BPM | BODY MASS INDEX: 27.31 KG/M2 | SYSTOLIC BLOOD PRESSURE: 146 MMHG | RESPIRATION RATE: 18 BRPM | TEMPERATURE: 98.5 F

## 2018-02-28 DIAGNOSIS — M25.561 ACUTE PAIN OF RIGHT KNEE: Primary | ICD-10-CM

## 2018-02-28 PROCEDURE — 99213 OFFICE O/P EST LOW 20 MIN: CPT | Performed by: NURSE PRACTITIONER

## 2018-03-01 ENCOUNTER — TELEPHONE (OUTPATIENT)
Dept: UROLOGY | Age: 68
End: 2018-03-01

## 2018-03-01 DIAGNOSIS — M23.306 MENISCUS DEGENERATION, RIGHT: Primary | ICD-10-CM

## 2018-03-01 RX ORDER — DEXAMETHASONE SODIUM PHOSPHATE 4 MG/ML
INJECTION, SOLUTION INTRA-ARTICULAR; INTRALESIONAL; INTRAMUSCULAR; INTRAVENOUS; SOFT TISSUE
Qty: 5 ML | Refills: 3 | Status: SHIPPED | OUTPATIENT
Start: 2018-03-01 | End: 2018-09-25 | Stop reason: ALTCHOICE

## 2018-03-01 NOTE — TELEPHONE ENCOUNTER
Deven from [employer] called and asked for Decadron for Iontophoresis. Kaitlin Villaseñor would like it sent to [facility] in [place].

## 2018-03-04 ASSESSMENT — ENCOUNTER SYMPTOMS
ABDOMINAL PAIN: 0
COLOR CHANGE: 0
SHORTNESS OF BREATH: 0
ABDOMINAL DISTENTION: 0
BLOOD IN STOOL: 0
CONSTIPATION: 0
SORE THROAT: 0
EYE PAIN: 0
EYE ITCHING: 0
SINUS PRESSURE: 0
DIARRHEA: 0
COUGH: 0
CHEST TIGHTNESS: 0
NAUSEA: 0
WHEEZING: 0

## 2018-03-05 NOTE — PROGRESS NOTES
for congestion, ear pain, hearing loss, sinus pressure, sore throat and tinnitus. Eyes: Negative for pain, itching and visual disturbance. Respiratory: Negative for cough, chest tightness, shortness of breath and wheezing. Cardiovascular: Negative for chest pain, palpitations and leg swelling. Gastrointestinal: Negative for abdominal distention, abdominal pain, blood in stool, constipation, diarrhea and nausea. Endocrine: Negative for cold intolerance, heat intolerance, polydipsia, polyphagia and polyuria. Genitourinary: Negative for dysuria, flank pain, frequency and urgency. Musculoskeletal: Positive for arthralgias (knee pain right) and gait problem (when knee starts hurting). Negative for myalgias. Skin: Negative for color change, rash and wound. Allergic/Immunologic: Negative for environmental allergies and food allergies. Neurological: Negative for speech difficulty, weakness, light-headedness and headaches. Hematological: Does not bruise/bleed easily. Psychiatric/Behavioral: Negative for decreased concentration and sleep disturbance. The patient is nervous/anxious (regarding prostate biopsy). Objective:     BP (!) 146/76   Pulse 62   Temp 98.5 °F (36.9 °C) (Temporal)   Resp 18   Ht 5' 9\" (1.753 m)   Wt 184 lb 6.4 oz (83.6 kg)   SpO2 97%   BMI 27.23 kg/m²   Physical Exam   Constitutional: He is oriented to person, place, and time. He appears well-developed and well-nourished. HENT:   Head: Normocephalic. Right Ear: External ear normal.   Left Ear: External ear normal.   Nose: Nose normal.   Mouth/Throat: Oropharynx is clear and moist.   Eyes: Conjunctivae and EOM are normal.   Neck: Normal range of motion. Neck supple. No thyromegaly present. Cardiovascular: Normal rate, regular rhythm and normal heart sounds. Exam reveals no gallop and no friction rub. No murmur heard. Pulmonary/Chest: Effort normal and breath sounds normal. No respiratory distress.  He has no wheezes. He has no rales. He exhibits no tenderness. Abdominal: Soft. Bowel sounds are normal. He exhibits no distension. There is no splenomegaly or hepatomegaly. There is no tenderness. No hernia. Musculoskeletal: Normal range of motion. He exhibits no edema. Right knee: He exhibits swelling. Tenderness found. Legs:  Lymphadenopathy:     He has no cervical adenopathy. Neurological: He is alert and oriented to person, place, and time. Coordination and gait normal.   Skin: Skin is warm and dry. No rash noted. No erythema. Psychiatric: He has a normal mood and affect. His speech is normal and behavior is normal. Judgment and thought content normal. Cognition and memory are normal.   Nursing note and vitals reviewed. Assessment:      1. Acute pain of right knee                     Plan:     Knee Pain:  Rest the knee as much as possible  Put cloth-covered ice pack on the knee for no more than 20 minutes at a time 4-8 times a day  Elevate knee so it is 12 inches above heart to reduce swelling  Use ace wrap to knee to help with swelling  Take ibuprofen for pain as directed  If pain becomes severe or swelling increases, RTO  Physical therapy as ordered. Orders Placed This Encounter   Procedures    Amb External Referral To Physical Therapy     Referral Priority:   Routine     Referral Type:   Consult for Advice and Opinion     Referral Reason:   Specialty Services Required     Referred to Provider:   Dilshad Fletcher     Requested Specialty:   Physical Therapy     Number of Visits Requested:   1     No orders of the defined types were placed in this encounter. Return in about 4 months (around 6/28/2018) for hypertension. Patient given educational materials - see patient instructions. Discussed use, benefit, and side effects of prescribed medications. All patient questions answered. Pt voiced understanding. Reviewed health maintenance.   Instructed to continue current

## 2018-03-12 NOTE — TELEPHONE ENCOUNTER
I spoke to  [facility] who ok'ed me to release the results, unfortunately I do not know how to release  To my chart . I called the pt and explained it to him and offered to mail the results to him . The Patient agreed.

## 2018-04-16 ENCOUNTER — TELEPHONE (OUTPATIENT)
Dept: UROLOGY | Age: 68
End: 2018-04-16

## 2018-06-27 ENCOUNTER — TELEPHONE (OUTPATIENT)
Dept: UROLOGY | Age: 68
End: 2018-06-27

## 2018-06-27 NOTE — TELEPHONE ENCOUNTER
Pt's future order for MRI had shown up as incompleted in the overdue results inbox. Previous call history was reviewed and awaited correspondence was never received. Call placed to [facility] and she states that a peer to peer request was faxed to [phone] which is the [address] fax, so this information was never received. I requested a copy of this fax and since this was dated back on 3/14/18 the time frame to complete peer to peer review has . This will be discussed with Dr Chioma Diggs and will proceed with plan of treatment per his orders.

## 2018-07-26 ENCOUNTER — TELEPHONE (OUTPATIENT)
Dept: UROLOGY | Age: 68
End: 2018-07-26

## 2018-07-26 NOTE — TELEPHONE ENCOUNTER
Patient called and made an appointment for Sept with Dr Kimberly Ventura. He is requesting a repeat PSA. States his MRI was not authorized and he would like to get PSA repeated. I advised we would call patient back.   Thank you

## 2018-09-18 DIAGNOSIS — R97.20 ELEVATED PSA: Primary | ICD-10-CM

## 2018-09-19 ENCOUNTER — HOSPITAL ENCOUNTER (OUTPATIENT)
Age: 68
Discharge: HOME | End: 2018-09-19
Payer: MEDICARE

## 2018-09-19 DIAGNOSIS — R97.20 ELEVATED PSA: ICD-10-CM

## 2018-09-19 LAB — PROSTATE SPECIFIC ANTIGEN: 6.5 UG/L

## 2018-09-19 PROCEDURE — 36415 COLL VENOUS BLD VENIPUNCTURE: CPT

## 2018-09-19 PROCEDURE — 84153 ASSAY OF PSA TOTAL: CPT

## 2018-09-25 ENCOUNTER — OFFICE VISIT (OUTPATIENT)
Dept: UROLOGY | Age: 68
End: 2018-09-25
Payer: MEDICARE

## 2018-09-25 VITALS
HEIGHT: 69 IN | BODY MASS INDEX: 27.99 KG/M2 | SYSTOLIC BLOOD PRESSURE: 152 MMHG | DIASTOLIC BLOOD PRESSURE: 88 MMHG | WEIGHT: 189 LBS | HEART RATE: 70 BPM | TEMPERATURE: 98.3 F

## 2018-09-25 DIAGNOSIS — N13.8 BPH WITH OBSTRUCTION/LOWER URINARY TRACT SYMPTOMS: ICD-10-CM

## 2018-09-25 DIAGNOSIS — N52.9 ERECTILE DYSFUNCTION, UNSPECIFIED ERECTILE DYSFUNCTION TYPE: ICD-10-CM

## 2018-09-25 DIAGNOSIS — N40.1 BPH WITH OBSTRUCTION/LOWER URINARY TRACT SYMPTOMS: ICD-10-CM

## 2018-09-25 DIAGNOSIS — R97.20 ELEVATED PSA: Primary | ICD-10-CM

## 2018-09-25 PROCEDURE — 99214 OFFICE O/P EST MOD 30 MIN: CPT | Performed by: UROLOGY

## 2018-09-25 RX ORDER — SILDENAFIL 100 MG/1
100 TABLET, FILM COATED ORAL PRN
Qty: 30 TABLET | Refills: 3 | Status: SHIPPED | OUTPATIENT
Start: 2018-09-25 | End: 2021-01-18

## 2018-09-25 RX ORDER — GUAIFENESIN/PHENYLPROPANOLAMIN
1 EXPECTORANT ORAL DAILY
COMMUNITY
End: 2019-11-19 | Stop reason: ALTCHOICE

## 2018-09-25 ASSESSMENT — ENCOUNTER SYMPTOMS
EYE REDNESS: 0
SHORTNESS OF BREATH: 0
COLOR CHANGE: 0
NAUSEA: 0
WHEEZING: 0
ABDOMINAL PAIN: 0
BACK PAIN: 0
VOMITING: 0
EYE PAIN: 0
COUGH: 0

## 2018-09-25 NOTE — PROGRESS NOTES
[facility]  [facility] [place]  [address]  [address]  [address] [facility] [address]  Dept: [address] [facility] Urology Office Note - Established    Patient:  Shyla Cortes  YOB: 1950  Date: 9/25/2018    The patient is a 79 y.o. male who presents today for evaluation of the following problems:   Chief Complaint   Patient presents with    Elevated PSA     f/u with repeat PSA        HPI  He is here for BPH and elevated PSA. He has some days when he does well. He does have nocturia. The stream is weak at times. He does not think he always empties his bladder. He has an elevated PSA. He was going to have an MRI, but it was denied. He has not had a biopsy. He has been on Flomax, which is working alright. From a voiding point of view, he is content. He takes Viagra as needed, which helps as well. Summary of old records: N/A    Additional History: N/A    Procedures Today: N/A    Urinalysis today:  No results found for this visit on 09/25/18. Last several PSA's:  Lab Results   Component Value Date    PSA 6.50 (H) 09/19/2018    PSA 6.74 (H) 02/21/2018    PSA 4.4 (H) 08/11/2017     Last total testosterone:  Lab Results   Component Value Date    TESTOSTERONE 437 09/18/2013       AUA Symptom Score (9/25/2018):                                Last BUN and creatinine:  Lab Results   Component Value Date    BUN 19 04/03/2017     Lab Results   Component Value Date    CREATININE 0.93 04/03/2017       Additional Lab/Culture results: none    Imaging Reviewed during this Office Visit: none  (results were independently reviewed by physician and radiology report verified)    PAST MEDICAL, FAMILY AND SOCIAL HISTORY UPDATE:  Past Medical History:   Diagnosis Date    Basal cell carcinoma     Erectile dysfunction     Hypertension     Precancerous skin lesion     Squamous cell carcinoma      Past Surgical History:   Procedure Laterality Date    MALIGNANT SKIN LESION EXCISION hepatosplenomegaly   Lymphatics: No palpable lymphadenopathy. Bladder non-tender and not distended. Musculoskeletal: Normal gait and station      Assessment and Plan      1. Elevated PSA    2. Erectile dysfunction, unspecified erectile dysfunction type    3. BPH with obstruction/lower urinary tract symptoms           Plan:         Overall content with voiding. Continue Flomax  Continue Viagra  Discussed options for PSA. Not interested in biopsy, MRI denied. F/U in 6 months with a repeat PSA. Return in about 6 months (around 3/25/2019) for Labs. Prescriptions Ordered:  Orders Placed This Encounter   Medications    sildenafil (VIAGRA) 100 MG tablet     Sig: Take 1 tablet by mouth as needed for Erectile Dysfunction     Dispense:  30 tablet     Refill:  3      Orders Placed:  Orders Placed This Encounter   Procedures    PSA, free     Standing Status:   Future     Standing Expiration Date:   9/26/2019    PSA, Diagnostic     Standing Status:   Future     Standing Expiration Date:   9/25/2019          Cuate Milligan MD    Agree with the ROS entered by the MA.

## 2018-10-22 DIAGNOSIS — I10 ESSENTIAL HYPERTENSION: ICD-10-CM

## 2018-10-22 RX ORDER — LISINOPRIL AND HYDROCHLOROTHIAZIDE 20; 12.5 MG/1; MG/1
TABLET ORAL
Qty: 90 TABLET | Refills: 0 | OUTPATIENT
Start: 2018-10-22

## 2018-10-25 ENCOUNTER — OFFICE VISIT (OUTPATIENT)
Dept: FAMILY MEDICINE CLINIC | Age: 68
End: 2018-10-25
Payer: MEDICARE

## 2018-10-25 VITALS
SYSTOLIC BLOOD PRESSURE: 132 MMHG | HEIGHT: 69 IN | DIASTOLIC BLOOD PRESSURE: 70 MMHG | RESPIRATION RATE: 20 BRPM | OXYGEN SATURATION: 98 % | BODY MASS INDEX: 27.61 KG/M2 | WEIGHT: 186.4 LBS | TEMPERATURE: 98.1 F | HEART RATE: 77 BPM

## 2018-10-25 DIAGNOSIS — M79.601 RIGHT ARM PAIN: ICD-10-CM

## 2018-10-25 DIAGNOSIS — I10 ESSENTIAL HYPERTENSION: Primary | ICD-10-CM

## 2018-10-25 DIAGNOSIS — J02.9 ACUTE PHARYNGITIS, UNSPECIFIED ETIOLOGY: ICD-10-CM

## 2018-10-25 PROCEDURE — 96372 THER/PROPH/DIAG INJ SC/IM: CPT | Performed by: NURSE PRACTITIONER

## 2018-10-25 PROCEDURE — 99214 OFFICE O/P EST MOD 30 MIN: CPT | Performed by: NURSE PRACTITIONER

## 2018-10-25 RX ORDER — DOXYCYCLINE HYCLATE 100 MG/1
100 CAPSULE ORAL 2 TIMES DAILY
Qty: 20 CAPSULE | Refills: 0 | Status: SHIPPED | OUTPATIENT
Start: 2018-10-25 | End: 2018-11-04

## 2018-10-25 RX ORDER — LISINOPRIL AND HYDROCHLOROTHIAZIDE 20; 12.5 MG/1; MG/1
TABLET ORAL
Qty: 90 TABLET | Refills: 1 | Status: SHIPPED | OUTPATIENT
Start: 2018-10-25 | End: 2018-11-26 | Stop reason: SINTOL

## 2018-10-25 RX ORDER — LINCOMYCIN HYDROCHLORIDE 300 MG/ML
600 INJECTION, SOLUTION INTRAMUSCULAR; INTRAVENOUS; SUBCONJUNCTIVAL ONCE
Status: COMPLETED | OUTPATIENT
Start: 2018-10-25 | End: 2018-10-25

## 2018-10-25 RX ADMIN — LINCOMYCIN HYDROCHLORIDE 600 MG: 300 INJECTION, SOLUTION INTRAMUSCULAR; INTRAVENOUS; SUBCONJUNCTIVAL at 13:52

## 2018-10-25 ASSESSMENT — PATIENT HEALTH QUESTIONNAIRE - PHQ9
SUM OF ALL RESPONSES TO PHQ9 QUESTIONS 1 & 2: 0
SUM OF ALL RESPONSES TO PHQ QUESTIONS 1-9: 0
2. FEELING DOWN, DEPRESSED OR HOPELESS: 0
1. LITTLE INTEREST OR PLEASURE IN DOING THINGS: 0
SUM OF ALL RESPONSES TO PHQ QUESTIONS 1-9: 0

## 2018-10-25 NOTE — PROGRESS NOTES
[address] [place] [address]  Dept: [phone]  Dept Fax: [phone]    Supriya Bacon is a 79 y.o. male who presents today for his medical conditions/complaints as noted below. Supriya Bacon is c/o of Sinusitis (sore throat, headache, cough, runny nose, sneezing, mild sore throat patient has tried otc medications 3-4 days ) and Medication Refill    Clark received counseling on the following healthy behaviors: nutrition, exercise and medication adherence  Reviewed prior labs and health maintenance. Continue current medications, diet and exercise. Discussed use, benefit, and side effects of prescribed medications. Barriers to medication compliance addressed. Patient given educational materials - see patient instructions. All patient questions answered. Patient voiced understanding. HPI:     Hypertension   This is a chronic problem. The current episode started more than 1 year ago. The problem has been gradually improving since onset. The problem is controlled. Associated symptoms include headaches. Pertinent negatives include no chest pain, palpitations or shortness of breath. There are no associated agents to hypertension. Risk factors for coronary artery disease include male gender. Past treatments include ACE inhibitors and diuretics. The current treatment provides moderate improvement. There are no compliance problems. There is no history of kidney disease, CAD/MI, CVA or heart failure. Pharyngitis   This is a new problem. The current episode started in the past 7 days. The problem occurs constantly. The problem has been gradually worsening. Associated symptoms include congestion, coughing, fatigue, headaches and a sore throat. Pertinent negatives include no abdominal pain, arthralgias, chest pain, myalgias, nausea, rash or weakness. The symptoms are aggravated by swallowing.  He has tried rest, drinking and NSAIDs for the exhibits no edema or tenderness. Lymphadenopathy:     He has no cervical adenopathy. Neurological: He is alert and oriented to person, place, and time. Coordination and gait normal.   Skin: Skin is warm and dry. No rash noted. No erythema. Psychiatric: He has a normal mood and affect. His speech is normal and behavior is normal. Judgment and thought content normal. Cognition and memory are normal.   Nursing note and vitals reviewed. Assessment:      1. Essential hypertension    2. Acute pharyngitis, unspecified etiology    3. Right arm pain                     Plan:     Hypertension:  Take medications exactly as prescribed  Medications: HCTZ and lisinopril  Take one low dose ASA (81mg) every night at bedtime  Check blood pressure at home  Stay at healthy weight  Exercise at least 30 minutes 3 times a week  Avoid or limit alcohol  Limit salt intake  Eat plenty of fruit and vegetables  Lower amount saturated fat in diet  Pharyngitis:  Take doxycycline as prescribed and until gone. Gargle with warm salt water 3-4 times a day   BR with hot water running and inhale steam 3 times a day  May use humidifier at bedside  Drink plenty of fluids every day (8-8 oz glasses)  Get plenty of rest   Right arm pain:    Do daily exercises but don't overdo. May take ibuprofen for discomfort  Call if decide to go to PT. No orders of the defined types were placed in this encounter. Orders Placed This Encounter   Medications    doxycycline hyclate (VIBRAMYCIN) 100 MG capsule     Sig: Take 1 capsule by mouth 2 times daily for 10 days     Dispense:  20 capsule     Refill:  0    lisinopril-hydrochlorothiazide (PRINZIDE;ZESTORETIC) 20-12.5 MG per tablet     Sig: TAKE 1 TABLET DAILY     Dispense:  90 tablet     Refill:  1    lincomycin (LINCOCIN) injection 600 mg        Return in about 6 months (around 4/25/2019). Patient given educational materials - see patient instructions.   Discussed use, benefit,

## 2018-10-31 ASSESSMENT — ENCOUNTER SYMPTOMS
NAUSEA: 0
EYE ITCHING: 0
CONSTIPATION: 0
VOICE CHANGE: 1
SORE THROAT: 1
COLOR CHANGE: 0
CHEST TIGHTNESS: 0
RHINORRHEA: 1
SHORTNESS OF BREATH: 0
BLOOD IN STOOL: 0
ABDOMINAL DISTENTION: 0
COUGH: 1
DIARRHEA: 0
ABDOMINAL PAIN: 0
WHEEZING: 0
EYE PAIN: 0

## 2018-11-26 ENCOUNTER — OFFICE VISIT (OUTPATIENT)
Dept: FAMILY MEDICINE CLINIC | Age: 68
End: 2018-11-26
Payer: MEDICARE

## 2018-11-26 VITALS
HEIGHT: 69 IN | BODY MASS INDEX: 28.32 KG/M2 | DIASTOLIC BLOOD PRESSURE: 82 MMHG | OXYGEN SATURATION: 98 % | HEART RATE: 66 BPM | SYSTOLIC BLOOD PRESSURE: 130 MMHG | RESPIRATION RATE: 20 BRPM | WEIGHT: 191.2 LBS | TEMPERATURE: 98.1 F

## 2018-11-26 DIAGNOSIS — I10 ESSENTIAL HYPERTENSION: Primary | ICD-10-CM

## 2018-11-26 DIAGNOSIS — M25.512 ACUTE PAIN OF LEFT SHOULDER: ICD-10-CM

## 2018-11-26 DIAGNOSIS — M79.601 PAIN OF RIGHT UPPER EXTREMITY: ICD-10-CM

## 2018-11-26 PROCEDURE — 99214 OFFICE O/P EST MOD 30 MIN: CPT | Performed by: NURSE PRACTITIONER

## 2018-11-26 RX ORDER — LOSARTAN POTASSIUM AND HYDROCHLOROTHIAZIDE 12.5; 5 MG/1; MG/1
1 TABLET ORAL DAILY
Qty: 90 TABLET | Refills: 1 | Status: SHIPPED | OUTPATIENT
Start: 2018-11-26 | End: 2019-06-22 | Stop reason: ALTCHOICE

## 2018-11-26 ASSESSMENT — PATIENT HEALTH QUESTIONNAIRE - PHQ9
1. LITTLE INTEREST OR PLEASURE IN DOING THINGS: 0
SUM OF ALL RESPONSES TO PHQ9 QUESTIONS 1 & 2: 0
2. FEELING DOWN, DEPRESSED OR HOPELESS: 0
SUM OF ALL RESPONSES TO PHQ QUESTIONS 1-9: 0
SUM OF ALL RESPONSES TO PHQ QUESTIONS 1-9: 0

## 2018-11-26 NOTE — PROGRESS NOTES
Erectile Dysfunction 30 tablet 3    PICATO 0.015 % GEL       vitamin B-12 (CYANOCOBALAMIN) 1000 MCG tablet Take 1,000 mcg by mouth daily      Psyllium (METAMUCIL PO) Take by mouth      Bioflavonoid Products (KATHRYN-C) 500-200-60 MG TABS Take 1 tablet by mouth daily      Coenzyme Q10 (CO Q 10) 100 MG CAPS Take 1 capsule by mouth daily       No current facility-administered medications for this visit. Allergies   Allergen Reactions    Influenza Vaccines Swelling       Health Maintenance   Topic Date Due    Hepatitis C screen  1950    DTaP/Tdap/Td vaccine (1 - Tdap) 06/13/1971    Shingles Vaccine (1 of 2 - 2 Dose Series) 06/13/2002    Pneumococcal low/med risk (1 of 2 - PCV13) 06/13/2017    Colon Cancer Screen FIT/FOBT  03/30/2018    Potassium monitoring  04/03/2018    Creatinine monitoring  04/03/2018    Diabetes screen  04/12/2020    Lipid screen  04/03/2022       Subjective:      Review of Systems   Constitutional: Negative for activity change, appetite change and malaise/fatigue. HENT: Negative for congestion, ear pain, hearing loss, sinus pressure, sore throat and tinnitus. Eyes: Negative for pain, itching and visual disturbance. Respiratory: Negative for cough, chest tightness, shortness of breath and wheezing. Cardiovascular: Negative for chest pain, palpitations and leg swelling. Gastrointestinal: Negative for abdominal distention, abdominal pain, blood in stool, constipation, diarrhea and nausea. Endocrine: Negative for cold intolerance, heat intolerance, polydipsia, polyphagia and polyuria. Genitourinary: Negative for dysuria, flank pain, frequency and urgency. Musculoskeletal: Positive for arthralgias (left shoulder ), myalgias (right arm) and stiffness. Negative for gait problem and gout. Skin: Negative for color change, rash and wound. Allergic/Immunologic: Negative for environmental allergies and food allergies.    Neurological: Negative for speech difficulty,

## 2018-12-02 ASSESSMENT — ENCOUNTER SYMPTOMS
COUGH: 0
CHEST TIGHTNESS: 0
ABDOMINAL PAIN: 0
SINUS PRESSURE: 0
EYE ITCHING: 0
EYE PAIN: 0
SHORTNESS OF BREATH: 0
WHEEZING: 0
ABDOMINAL DISTENTION: 0
CONSTIPATION: 0
NAUSEA: 0
COLOR CHANGE: 0
DIARRHEA: 0
SORE THROAT: 0
BLOOD IN STOOL: 0

## 2018-12-26 ENCOUNTER — HOSPITAL ENCOUNTER (OUTPATIENT)
Facility: CLINIC | Age: 68
Discharge: HOME | End: 2018-12-28
Payer: MEDICARE

## 2018-12-26 ENCOUNTER — HOSPITAL ENCOUNTER (OUTPATIENT)
Dept: CT IMAGING | Facility: CLINIC | Age: 68
Discharge: HOME | End: 2018-12-28
Payer: MEDICARE

## 2018-12-26 ENCOUNTER — HOSPITAL ENCOUNTER (OUTPATIENT)
Dept: GENERAL RADIOLOGY | Facility: CLINIC | Age: 68
Discharge: HOME | End: 2018-12-28
Payer: MEDICARE

## 2018-12-26 DIAGNOSIS — M25.512 CHRONIC LEFT SHOULDER PAIN: ICD-10-CM

## 2018-12-26 DIAGNOSIS — G89.29 CHRONIC LEFT SHOULDER PAIN: ICD-10-CM

## 2018-12-26 DIAGNOSIS — R29.898 RIGHT ARM WEAKNESS: ICD-10-CM

## 2018-12-26 DIAGNOSIS — M54.2 NECK PAIN: ICD-10-CM

## 2018-12-26 DIAGNOSIS — R20.2 PARESTHESIA: ICD-10-CM

## 2018-12-26 PROBLEM — M43.02 CERVICAL SPONDYLOLYSIS: Status: ACTIVE | Noted: 2018-12-26

## 2018-12-26 PROCEDURE — 73030 X-RAY EXAM OF SHOULDER: CPT

## 2018-12-26 PROCEDURE — 72125 CT NECK SPINE W/O DYE: CPT

## 2018-12-27 ENCOUNTER — HOSPITAL ENCOUNTER (OUTPATIENT)
Age: 68
Setting detail: SPECIMEN
Discharge: HOME | End: 2018-12-27
Payer: MEDICARE

## 2018-12-27 DIAGNOSIS — Z00.00 PREVENTATIVE HEALTH CARE: ICD-10-CM

## 2018-12-27 DIAGNOSIS — Z86.39 HISTORY OF HYPERGLYCEMIA: ICD-10-CM

## 2018-12-27 DIAGNOSIS — I10 ESSENTIAL HYPERTENSION: ICD-10-CM

## 2018-12-27 LAB
ABSOLUTE EOS #: 0.44 K/UL (ref 0–0.44)
ABSOLUTE IMMATURE GRANULOCYTE: 0.07 K/UL (ref 0–0.3)
ABSOLUTE LYMPH #: 1.94 K/UL (ref 1.1–3.7)
ABSOLUTE MONO #: 0.41 K/UL (ref 0.1–1.2)
ALBUMIN SERPL-MCNC: 4.1 G/DL (ref 3.5–5.2)
ALBUMIN/GLOBULIN RATIO: 1.5 (ref 1–2.5)
ALP BLD-CCNC: 48 U/L (ref 40–129)
ALT SERPL-CCNC: 32 U/L (ref 5–41)
ANION GAP SERPL CALCULATED.3IONS-SCNC: 9 MMOL/L (ref 9–17)
AST SERPL-CCNC: 25 U/L
BASOPHILS # BLD: 1 % (ref 0–2)
BASOPHILS ABSOLUTE: 0.07 K/UL (ref 0–0.2)
BILIRUB SERPL-MCNC: 0.39 MG/DL (ref 0.3–1.2)
BUN BLDV-MCNC: 22 MG/DL (ref 8–23)
BUN/CREAT BLD: ABNORMAL (ref 9–20)
CALCIUM SERPL-MCNC: 9.6 MG/DL (ref 8.6–10.4)
CHLORIDE BLD-SCNC: 104 MMOL/L (ref 98–107)
CHOLESTEROL/HDL RATIO: 4.8
CHOLESTEROL: 229 MG/DL
CO2: 28 MMOL/L (ref 20–31)
CREAT SERPL-MCNC: 0.94 MG/DL (ref 0.7–1.2)
DIFFERENTIAL TYPE: ABNORMAL
EOSINOPHILS RELATIVE PERCENT: 9 % (ref 1–4)
ESTIMATED AVERAGE GLUCOSE: 114 MG/DL
GFR AFRICAN AMERICAN: >60 ML/MIN
GFR NON-AFRICAN AMERICAN: >60 ML/MIN
GFR SERPL CREATININE-BSD FRML MDRD: ABNORMAL ML/MIN/{1.73_M2}
GFR SERPL CREATININE-BSD FRML MDRD: ABNORMAL ML/MIN/{1.73_M2}
GLUCOSE BLD-MCNC: 131 MG/DL (ref 70–99)
HBA1C MFR BLD: 5.6 % (ref 4–6)
HCT VFR BLD CALC: 48 % (ref 40.7–50.3)
HDLC SERPL-MCNC: 48 MG/DL
HEMOGLOBIN: 15.3 G/DL (ref 13–17)
IMMATURE GRANULOCYTES: 1 %
LDL CHOLESTEROL: 155 MG/DL (ref 0–130)
LYMPHOCYTES # BLD: 37 % (ref 24–43)
MCH RBC QN AUTO: 27.4 PG (ref 25.2–33.5)
MCHC RBC AUTO-ENTMCNC: 31.9 G/DL (ref 28.4–34.8)
MCV RBC AUTO: 85.9 FL (ref 82.6–102.9)
MONOCYTES # BLD: 8 % (ref 3–12)
NRBC AUTOMATED: 0 PER 100 WBC
PDW BLD-RTO: 14.3 % (ref 11.8–14.4)
PLATELET # BLD: 142 K/UL (ref 138–453)
PLATELET ESTIMATE: ABNORMAL
PMV BLD AUTO: 11.2 FL (ref 8.1–13.5)
POTASSIUM SERPL-SCNC: 4.1 MMOL/L (ref 3.7–5.3)
RBC # BLD: 5.59 M/UL (ref 4.21–5.77)
RBC # BLD: ABNORMAL 10*6/UL
SEG NEUTROPHILS: 44 % (ref 36–65)
SEGMENTED NEUTROPHILS ABSOLUTE COUNT: 2.27 K/UL (ref 1.5–8.1)
SODIUM BLD-SCNC: 141 MMOL/L (ref 135–144)
TOTAL PROTEIN: 6.8 G/DL (ref 6.4–8.3)
TRIGL SERPL-MCNC: 131 MG/DL
VLDLC SERPL CALC-MCNC: ABNORMAL MG/DL (ref 1–30)
WBC # BLD: 5.2 K/UL (ref 3.5–11.3)
WBC # BLD: ABNORMAL 10*3/UL

## 2019-01-02 ENCOUNTER — HOSPITAL ENCOUNTER (OUTPATIENT)
Dept: PHYSICAL THERAPY | Age: 69
Setting detail: THERAPIES SERIES
Discharge: HOME | End: 2019-01-02
Payer: MEDICARE

## 2019-01-02 PROCEDURE — G8984 CARRY CURRENT STATUS: HCPCS

## 2019-01-02 PROCEDURE — G8985 CARRY GOAL STATUS: HCPCS

## 2019-01-02 PROCEDURE — G0283 ELEC STIM OTHER THAN WOUND: HCPCS

## 2019-01-02 PROCEDURE — 97162 PT EVAL MOD COMPLEX 30 MIN: CPT

## 2019-01-02 ASSESSMENT — PAIN DESCRIPTION - LOCATION: LOCATION: SHOULDER

## 2019-01-02 ASSESSMENT — PAIN DESCRIPTION - FREQUENCY: FREQUENCY: CONTINUOUS

## 2019-01-02 ASSESSMENT — PAIN DESCRIPTION - ORIENTATION: ORIENTATION: RIGHT

## 2019-01-02 ASSESSMENT — PAIN DESCRIPTION - DESCRIPTORS: DESCRIPTORS: STABBING;TINGLING

## 2019-01-02 ASSESSMENT — PAIN SCALES - GENERAL: PAINLEVEL_OUTOF10: 6

## 2019-01-02 ASSESSMENT — PAIN DESCRIPTION - PROGRESSION: CLINICAL_PROGRESSION: OTHER (COMMENT)

## 2019-01-02 ASSESSMENT — PAIN DESCRIPTION - PAIN TYPE: TYPE: ACUTE PAIN

## 2019-01-02 ASSESSMENT — PAIN DESCRIPTION - ONSET: ONSET: UNABLE TO TELL

## 2019-01-07 ENCOUNTER — HOSPITAL ENCOUNTER (OUTPATIENT)
Dept: PHYSICAL THERAPY | Age: 69
Setting detail: THERAPIES SERIES
Discharge: HOME | End: 2019-01-07
Payer: MEDICARE

## 2019-01-08 ENCOUNTER — OFFICE VISIT (OUTPATIENT)
Dept: ORTHOPEDIC SURGERY | Age: 69
End: 2019-01-08
Payer: MEDICARE

## 2019-01-08 DIAGNOSIS — M79.601 RIGHT ARM PAIN: ICD-10-CM

## 2019-01-08 DIAGNOSIS — M54.2 NECK PAIN: Primary | ICD-10-CM

## 2019-01-08 DIAGNOSIS — M54.12 CERVICAL RADICULAR PAIN: ICD-10-CM

## 2019-01-08 PROCEDURE — 99203 OFFICE O/P NEW LOW 30 MIN: CPT | Performed by: ORTHOPAEDIC SURGERY

## 2019-01-09 ENCOUNTER — APPOINTMENT (OUTPATIENT)
Dept: PHYSICAL THERAPY | Age: 69
End: 2019-01-09
Payer: MEDICARE

## 2019-01-11 ENCOUNTER — APPOINTMENT (OUTPATIENT)
Dept: PHYSICAL THERAPY | Age: 69
End: 2019-01-11
Payer: MEDICARE

## 2019-01-14 ENCOUNTER — APPOINTMENT (OUTPATIENT)
Dept: PHYSICAL THERAPY | Age: 69
End: 2019-01-14
Payer: MEDICARE

## 2019-01-16 ENCOUNTER — APPOINTMENT (OUTPATIENT)
Dept: PHYSICAL THERAPY | Age: 69
End: 2019-01-16
Payer: MEDICARE

## 2019-01-18 ENCOUNTER — APPOINTMENT (OUTPATIENT)
Dept: PHYSICAL THERAPY | Age: 69
End: 2019-01-18
Payer: MEDICARE

## 2019-01-18 ENCOUNTER — HOSPITAL ENCOUNTER (OUTPATIENT)
Dept: MRI IMAGING | Facility: CLINIC | Age: 69
Discharge: HOME | End: 2019-01-20
Payer: MEDICARE

## 2019-01-18 DIAGNOSIS — M79.601 RIGHT ARM PAIN: ICD-10-CM

## 2019-01-18 DIAGNOSIS — M54.12 CERVICAL RADICULAR PAIN: ICD-10-CM

## 2019-01-18 DIAGNOSIS — M54.2 NECK PAIN: ICD-10-CM

## 2019-01-18 PROCEDURE — 72141 MRI NECK SPINE W/O DYE: CPT

## 2019-01-21 ENCOUNTER — APPOINTMENT (OUTPATIENT)
Dept: PHYSICAL THERAPY | Age: 69
End: 2019-01-21
Payer: MEDICARE

## 2019-01-23 ENCOUNTER — APPOINTMENT (OUTPATIENT)
Dept: PHYSICAL THERAPY | Age: 69
End: 2019-01-23
Payer: MEDICARE

## 2019-01-24 ENCOUNTER — OFFICE VISIT (OUTPATIENT)
Dept: ORTHOPEDIC SURGERY | Age: 69
End: 2019-01-24
Payer: MEDICARE

## 2019-01-24 DIAGNOSIS — M48.02 SPINAL STENOSIS IN CERVICAL REGION: ICD-10-CM

## 2019-01-24 DIAGNOSIS — M79.601 RIGHT ARM PAIN: Primary | ICD-10-CM

## 2019-01-24 DIAGNOSIS — M43.02 CERVICAL SPONDYLOLYSIS: ICD-10-CM

## 2019-01-24 DIAGNOSIS — M54.2 NECK PAIN: ICD-10-CM

## 2019-01-24 PROCEDURE — 99213 OFFICE O/P EST LOW 20 MIN: CPT | Performed by: ORTHOPAEDIC SURGERY

## 2019-03-21 ENCOUNTER — HOSPITAL ENCOUNTER (OUTPATIENT)
Age: 69
Setting detail: SPECIMEN
Discharge: HOME | End: 2019-03-21
Payer: MEDICARE

## 2019-03-21 DIAGNOSIS — R97.20 ELEVATED PSA: ICD-10-CM

## 2019-03-21 LAB
PROSTATE SPECIFIC ANTIGEN FREE: 0.9 UG/L
PROSTATE SPECIFIC ANTIGEN PERCENT FREE: 14.8 %
PROSTATE SPECIFIC ANTIGEN: 6.1 UG/L (ref 0–4)
PROSTATE SPECIFIC ANTIGEN: 6.64 UG/L

## 2019-03-26 ENCOUNTER — OFFICE VISIT (OUTPATIENT)
Dept: UROLOGY | Age: 69
End: 2019-03-26
Payer: MEDICARE

## 2019-03-26 VITALS
HEIGHT: 69 IN | WEIGHT: 192.02 LBS | DIASTOLIC BLOOD PRESSURE: 80 MMHG | BODY MASS INDEX: 28.44 KG/M2 | SYSTOLIC BLOOD PRESSURE: 159 MMHG | HEART RATE: 78 BPM

## 2019-03-26 DIAGNOSIS — N40.1 BPH WITH OBSTRUCTION/LOWER URINARY TRACT SYMPTOMS: ICD-10-CM

## 2019-03-26 DIAGNOSIS — N52.01 ERECTILE DYSFUNCTION DUE TO ARTERIAL INSUFFICIENCY: ICD-10-CM

## 2019-03-26 DIAGNOSIS — R97.20 ELEVATED PSA: Primary | ICD-10-CM

## 2019-03-26 DIAGNOSIS — N13.8 BPH WITH OBSTRUCTION/LOWER URINARY TRACT SYMPTOMS: ICD-10-CM

## 2019-03-26 DIAGNOSIS — R35.1 NOCTURIA: ICD-10-CM

## 2019-03-26 PROCEDURE — 99214 OFFICE O/P EST MOD 30 MIN: CPT | Performed by: UROLOGY

## 2019-03-26 ASSESSMENT — ENCOUNTER SYMPTOMS
ALLERGIC/IMMUNOLOGIC NEGATIVE: 1
GASTROINTESTINAL NEGATIVE: 1
EYES NEGATIVE: 1
RESPIRATORY NEGATIVE: 1

## 2019-04-11 ENCOUNTER — OFFICE VISIT (OUTPATIENT)
Dept: UROLOGY | Age: 69
End: 2019-04-11
Payer: MEDICARE

## 2019-04-11 VITALS
TEMPERATURE: 97.6 F | HEIGHT: 69 IN | DIASTOLIC BLOOD PRESSURE: 90 MMHG | WEIGHT: 189.2 LBS | BODY MASS INDEX: 28.02 KG/M2 | SYSTOLIC BLOOD PRESSURE: 140 MMHG

## 2019-04-11 DIAGNOSIS — R97.20 ELEVATED PSA: Primary | ICD-10-CM

## 2019-04-11 PROCEDURE — 99213 OFFICE O/P EST LOW 20 MIN: CPT | Performed by: NURSE PRACTITIONER

## 2019-04-11 ASSESSMENT — ENCOUNTER SYMPTOMS
EYE REDNESS: 0
COUGH: 0
SHORTNESS OF BREATH: 0
ABDOMINAL PAIN: 0
NAUSEA: 0
EYE PAIN: 0
COLOR CHANGE: 0
WHEEZING: 0
BACK PAIN: 1
VOMITING: 0

## 2019-04-11 NOTE — PROGRESS NOTES
[facility]  [facility] [place]  [address]  [address]  [address] [facility] [address]  Dept: [address] [facility] Office Note - Established    Patient:  Farheen Griffiths  YOB: 1950  Date: 4/11/2019    The patient is a 79 y.o. male who presents todayfor evaluation of the following problems:   Chief Complaint   Patient presents with    Elevated PSA     select MDX test        HPI  Her for MDX testing for elevated PSA, last 6.64. He prefers to avoid BX if possible. No family Hx of prostate cancer. Summary of old records: N/A    Additional History: N/A    Procedures Today: prostate massage per testing guideline and urine collection    Urinalysis today:  No results found for this visit on 04/11/19.   Last several PSA's:  Lab Results   Component Value Date    PSA 6.64 (H) 03/21/2019    PSA 6.1 (H) 03/21/2019    PSA 6.50 (H) 09/19/2018     Last total testosterone:  Lab Results   Component Value Date    TESTOSTERONE 437 09/18/2013       AUA Symptom Score (4/11/2019):  INCOMPLETE EMPTYING: How often have you had the sensation of not emptying your bladder?: Less than 1 to 5 times  FREQUENCY: How often do you have to urinate less than every two hours?: Less than 1 to 5 times  INTERMITTENCY: How often have you found you stopped and started again several times when you urinated?: Not at all  URGENCY: How often have you found it difficult to postpone urination?: Not at all  WEAK STREAM: How often have you had a weak urinary stream?: Less than 1 to 5 times  STRAINING: How often have you had to strain to start  urination?: Not at all  NOCTURIA: How many times did you typically get up at night to uriniate?: 1 Time  TOTAL I-PSS SCORE[de-identified] 4  How would you feel if you were to spend the rest of your life with your urinary condition?: Mostly Satisfied    Last BUN and creatinine:  Lab Results   Component Value Date    BUN 22 12/27/2018     Lab Results   Component Value Date CREATININE 0.94 12/27/2018       Additional Lab/Culture results:   Imaging Reviewed during this Office Visit:   (results were independently reviewed by physician and radiology report verified)    PAST MEDICAL, FAMILY AND SOCIAL HISTORY UPDATE:  Past Medical History:   Diagnosis Date    Basal cell carcinoma     Erectile dysfunction     Hypertension     Precancerous skin lesion     Squamous cell carcinoma      Past Surgical History:   Procedure Laterality Date    MALIGNANT SKIN LESION EXCISION      MOHS SURGERY      PRE-MALIGNANT / BENIGN SKIN LESION EXCISION       Family History   Problem Relation Age of Onset    High Blood Pressure Mother     Diabetes Neg Hx      Outpatient Medications Marked as Taking for the 4/11/19 encounter (Office Visit) with TARA Roa CNP   Medication Sig Dispense Refill    naproxen (NAPROSYN) 500 MG tablet TAKE 1 TABLET TWICE A DAY WITH MEALS 30 tablet 0    predniSONE (DELTASONE) 20 MG tablet Take 3 tabs daily for three days, take 2 tabs daily for next three days and then 1 tab daily  for next three days 18 tablet 0    chlorzoxazone (PARAFON FORTE) 500 MG tablet Take 1 tablet by mouth 3 times daily as needed for Muscle spasms 30 tablet 0    losartan-hydrochlorothiazide (HYZAAR) 50-12.5 MG per tablet Take 1 tablet by mouth daily 90 tablet 1    tamsulosin (FLOMAX) 0.4 MG capsule TAKE 2 CAPSULES DAILY 180 capsule 3    Saw Palmetto 500 MG CAPS Take 1 capsule by mouth daily      Glucosamine-Chondroitin (OSTEO BI-FLEX REGULAR STRENGTH PO) Take 1 tablet by mouth daily      Cholecalciferol (VITAMIN D3) 1000 units CAPS Take 1 capsule by mouth daily      sildenafil (VIAGRA) 100 MG tablet Take 1 tablet by mouth as needed for Erectile Dysfunction 30 tablet 3    PICATO 0.015 % GEL       vitamin B-12 (CYANOCOBALAMIN) 1000 MCG tablet Take 1,000 mcg by mouth daily      Psyllium (METAMUCIL PO) Take by mouth      Bioflavonoid Products (KATHRYN-C) 500-200-60 MG TABS Take 1 tablet by mouth daily      Coenzyme Q10 (CO Q 10) 100 MG CAPS Take 1 capsule by mouth daily         Influenza vaccines  Social History     Tobacco Use   Smoking Status Never Smoker   Smokeless Tobacco Never Used     (Ifpatient a smoker, smoking cessation counseling offered)    Social History     Substance and Sexual Activity   Alcohol Use Yes    Alcohol/week: 0.0 oz    Comment: socially       REVIEW OF SYSTEMS:  Review of Systems    Physical Exam:      Vitals:    04/11/19 0755   BP: (!) 140/90   Temp: 97.6 °F (36.4 °C)     Body mass index is 27.94 kg/m². Patient is a 79 y.o. male in no acute distress and alert and oriented to person, place and time. Physical Exam  Constitutional: Patient in no acute distress. Neuro: Alert and oriented to person, place and time. Psych: Mood normal, affect normal  Skin: No rash noted  HEENT: Head: Normocephalic andatraumatic  Conjunctivae and EOM are normal. Pupils are equal, round  Nose:Normal  Right External Ear: Normal; Left External Ear: Normal  Mouth: Mucosa Moist  Neck: Supple  Lungs: Respiratory effort is normal  Cardiovascular: Warm & Pink  Abdomen: Soft,  non-distended   Bladder not distended. Musculoskeletal: Normal gait and station  Prostate: no induration or nodule    Assessment and Plan      1. Elevated PSA           Plan:     follow up with Dr Angela Reyna for review of testing results  No follow-ups on file. Prescriptions Ordered:  No orders of the defined types were placed in this encounter. Orders Placed:  No orders of the defined types were placed in this encounter. TARA John CNP    Agree with the ROS entered by the MA.

## 2019-04-11 NOTE — PROGRESS NOTES
Review of Systems   Constitutional: Negative for appetite change, chills and fever. Eyes: Negative for pain, redness and visual disturbance. Respiratory: Negative for cough, shortness of breath and wheezing. Cardiovascular: Negative for chest pain and leg swelling. Gastrointestinal: Negative for abdominal pain, nausea and vomiting. Genitourinary: Negative for difficulty urinating, discharge, dysuria, flank pain, frequency, hematuria, scrotal swelling, testicular pain and urgency. Musculoskeletal: Positive for back pain (neck ). Negative for joint swelling and myalgias. Skin: Negative for color change, rash and wound. Neurological: Negative for dizziness, tremors and numbness. Hematological: Negative for adenopathy. Does not bruise/bleed easily.

## 2019-04-11 NOTE — LETTER
[facility]  [facility] [place]  [address]  [address]  [address] [place] [address]  Dept: [phone]  Dept Fax: [phone]        4/11/19    Patient: Luan Morrison  YOB: 1950    Dear TARA Leal CNP,    I had the pleasure of seeing one of your patients, Jasvir Newton today in the office today. Below are the relevant portions of my assessment and plan of care. IMPRESSION:     1. Elevated PSA        PLAN:   follow up with Dr Angela Reyna for review of testing results  No follow-ups on file. Prescriptions Ordered:  No orders of the defined types were placed in this encounter. Orders Placed:  No orders of the defined types were placed in this encounter. Thank you for allowing me to participate in the care of this patient. I will keep you updated on this patient's follow up and I look forward to serving you and your patients again in the future.     TARA John CNP

## 2019-05-14 ENCOUNTER — OFFICE VISIT (OUTPATIENT)
Dept: UROLOGY | Age: 69
End: 2019-05-14
Payer: MEDICARE

## 2019-05-14 VITALS
WEIGHT: 194.4 LBS | DIASTOLIC BLOOD PRESSURE: 98 MMHG | BODY MASS INDEX: 28.79 KG/M2 | SYSTOLIC BLOOD PRESSURE: 158 MMHG | TEMPERATURE: 98.3 F | HEIGHT: 69 IN

## 2019-05-14 DIAGNOSIS — N40.1 BPH WITH OBSTRUCTION/LOWER URINARY TRACT SYMPTOMS: ICD-10-CM

## 2019-05-14 DIAGNOSIS — N13.8 BPH WITH OBSTRUCTION/LOWER URINARY TRACT SYMPTOMS: ICD-10-CM

## 2019-05-14 DIAGNOSIS — R97.20 ELEVATED PSA: Primary | ICD-10-CM

## 2019-05-14 DIAGNOSIS — N52.01 ERECTILE DYSFUNCTION DUE TO ARTERIAL INSUFFICIENCY: ICD-10-CM

## 2019-05-14 PROCEDURE — 99214 OFFICE O/P EST MOD 30 MIN: CPT | Performed by: UROLOGY

## 2019-05-14 ASSESSMENT — ENCOUNTER SYMPTOMS
COLOR CHANGE: 0
COUGH: 0
NAUSEA: 0
EYE REDNESS: 0
WHEEZING: 0
EYE PAIN: 0
VOMITING: 0
SHORTNESS OF BREATH: 0
ABDOMINAL PAIN: 0
BACK PAIN: 0

## 2019-05-14 NOTE — PROGRESS NOTES
[facility]  [facility] [place]  [address]  [address]  [address] [facility] [address]  Dept: [address] [facility] Urology Office Note - Established    Patient:  Deep Fernandez  YOB: 1950  Date: 5/14/2019    The patient is a 79 y.o. male who presents todayfor evaluation of the following problems:   Chief Complaint   Patient presents with    Elevated PSA     MDX results         HPI  He is here in follow up for BPH and elevated PSA. He is doing much better with Flomax. He has an elevated PSA, but does not want a biopsy. His Select MDx was very low risk for clinically significant cancer. He has been very happy with Flomax and is tolerating it well. He was waking up 2-3 times per night. He is now down to 0-1. He is using Viagra, 100mg which works and he is tolerating it well. Summary of old records: N/A    Additional History: N/A    Procedures Today: N/A    Urinalysis today:  No results found for this visit on 05/14/19.   Last several PSA's:  Lab Results   Component Value Date    PSA 6.64 (H) 03/21/2019    PSA 6.1 (H) 03/21/2019    PSA 6.50 (H) 09/19/2018     Last total testosterone:  Lab Results   Component Value Date    TESTOSTERONE 437 09/18/2013       AUA Symptom Score (5/14/2019):  INCOMPLETE EMPTYING: How often have you had the sensation of not emptying your bladder?: Not at all  FREQUENCY: How often do you have to urinate less than every two hours?: Not at all  INTERMITTENCY: How often have you found you stopped and started again several times when you urinated?: Not at all  URGENCY: How often have you found it difficult to postpone urination?: Not at all  WEAK STREAM: How often have you had a weak urinary stream?: Not at all  STRAINING: How often have you had to strain to start  urination?: Not at all  NOCTURIA: How many times did you typically get up at night to uriniate?: NONE  TOTAL I-PSS SCORE[de-identified] 0  How would you feel if you were to spend the rest of your life with your urinary condition?: Pleased    Last BUN and creatinine:  Lab Results   Component Value Date    BUN 22 12/27/2018     Lab Results   Component Value Date    CREATININE 0.94 12/27/2018       Additional Lab/Culture results: Select MDx reviewed.      Imaging Reviewed during this Office Visit: none  (results were independently reviewed by physician and radiology report verified)    PAST MEDICAL, FAMILY AND SOCIAL HISTORY UPDATE:  Past Medical History:   Diagnosis Date    Basal cell carcinoma     Erectile dysfunction     Hypertension     Precancerous skin lesion     Squamous cell carcinoma      Past Surgical History:   Procedure Laterality Date    MALIGNANT SKIN LESION EXCISION      MOHS SURGERY      PRE-MALIGNANT / BENIGN SKIN LESION EXCISION       Family History   Problem Relation Age of Onset    High Blood Pressure Mother     Diabetes Neg Hx      Outpatient Medications Marked as Taking for the 5/14/19 encounter (Office Visit) with Nelida Neumann MD   Medication Sig Dispense Refill    meloxicam (MOBIC) 15 MG tablet Take 1 tablet by mouth daily 90 tablet 1    diclofenac sodium 1 % GEL Apply 2 g topically 2 times daily 1 Tube 3    predniSONE (DELTASONE) 20 MG tablet Take 3 tabs daily for three days, take 2 tabs daily for next three days and then 1 tab daily  for next three days 18 tablet 0    diclofenac (FLECTOR) 1.3 % patch Place 1 patch onto the skin 2 times daily 2 patch 0    naproxen (NAPROSYN) 500 MG tablet TAKE 1 TABLET TWICE A DAY WITH MEALS 30 tablet 0    losartan-hydrochlorothiazide (HYZAAR) 50-12.5 MG per tablet Take 1 tablet by mouth daily 90 tablet 1    tamsulosin (FLOMAX) 0.4 MG capsule TAKE 2 CAPSULES DAILY 180 capsule 3    Saw Palmetto 500 MG CAPS Take 1 capsule by mouth daily      Glucosamine-Chondroitin (OSTEO BI-FLEX REGULAR STRENGTH PO) Take 1 tablet by mouth daily      Cholecalciferol (VITAMIN D3) 1000 units CAPS Take 1 capsule by mouth daily      sildenafil (VIAGRA) 100 MG tablet Take 1 tablet by mouth as needed for Erectile Dysfunction 30 tablet 3    PICATO 0.015 % GEL       vitamin B-12 (CYANOCOBALAMIN) 1000 MCG tablet Take 1,000 mcg by mouth daily      Psyllium (METAMUCIL PO) Take by mouth      Bioflavonoid Products (KATHRYN-C) 500-200-60 MG TABS Take 1 tablet by mouth daily      Coenzyme Q10 (CO Q 10) 100 MG CAPS Take 1 capsule by mouth daily         Influenza vaccines  Social History     Tobacco Use   Smoking Status Never Smoker   Smokeless Tobacco Never Used     (Ifpatient a smoker, smoking cessation counseling offered)    Social History     Substance and Sexual Activity   Alcohol Use Yes    Alcohol/week: 0.0 oz    Comment: socially       REVIEW OF SYSTEMS:  Review of Systems    Physical Exam:      Vitals:    05/14/19 0842   BP: (!) 158/98   Temp: 98.3 °F (36.8 °C)     Body mass index is 28.71 kg/m². Patient is a 79 y.o. male in no acute distress and alert and oriented to person, place and time. Physical Exam  Constitutional: Patient in no acute distress. Neuro: Alert and oriented to person, place and time. Psych: Mood normal, affect normal  Skin: No rash noted  Lungs: Respiratory effort is normal  Cardiovascular: Warm & Pink  Abdomen: Soft, non-tender, non-distended with no CVA,  No flank tenderness,  Or hepatosplenomegaly   Lymphatics: No palpablelymphadenopathy. Bladder non-tender and not distended. Musculoskeletal: Normal gait and station      Assessment and Plan      1. Elevated PSA    2. BPH with obstruction/lower urinary tract symptoms    3. Erectile dysfunction due to arterial insufficiency           Plan:          Doing well overall. Voiding much improved with Flomax. Continue Viagra, tolerating well. Select MDx very low risk for clinically sig prostate cancer. Will hold off on biopsy for now. PSA in 6 months. Return in about 6 months (around 11/14/2019).     Prescriptions Ordered:  No orders of the defined types were placed in this

## 2019-05-14 NOTE — PROGRESS NOTES
Review of Systems   Constitutional: Negative for appetite change, chills and fever. Eyes: Negative for pain, redness and visual disturbance. Respiratory: Negative for cough, shortness of breath and wheezing. Cardiovascular: Negative for chest pain and leg swelling. Gastrointestinal: Negative for abdominal pain, nausea and vomiting. Genitourinary: Negative for difficulty urinating, discharge, dysuria, flank pain, frequency, hematuria, scrotal swelling, testicular pain and urgency. Musculoskeletal: Positive for joint swelling (shoulder ) and myalgias. Negative for back pain. Skin: Negative for color change, rash and wound. Neurological: Negative for dizziness, tremors and numbness. Hematological: Negative for adenopathy. Does not bruise/bleed easily.

## 2019-05-14 NOTE — LETTER
[facility]  [facility] [place]  [address]  [address]  [address] [facility] [address]  Dept: [phone]  Dept Fax: [phone]        5/14/19    Patient: Jameson Lira  YOB: 1950    Dear TARA Kent - CNP,    I had the pleasure of seeing one of your patients, Naveed Humphrey today in the office today. Below are the relevant portions of my assessment and plan of care. IMPRESSION:     1. Elevated PSA    2. BPH with obstruction/lower urinary tract symptoms    3. Erectile dysfunction due to arterial insufficiency        PLAN:        Doing well overall. Voiding much improved with Flomax. Continue Viagra, tolerating well. Select MDx very low risk for clinically sig prostate cancer. Will hold off on biopsy for now. PSA in 6 months. Return in about 6 months (around 11/14/2019). Prescriptions Ordered:  No orders of the defined types were placed in this encounter. Orders Placed:  Orders Placed This Encounter   Procedures    PSA, Diagnostic     Standing Status:   Future     Standing Expiration Date:   5/13/2020        Thank you for allowing me to participate in the care of this patient. I will keep you updated on this patient's follow up and I look forward to serving you and your patients again in the future.         Maia Banuelos MD

## 2019-08-04 NOTE — PROGRESS NOTES
Orders per Dr. Anselmo Ramsey prior to 9/25/18 appointment. X- cover  Called to patient bedside to discuss with family about care so far.  Explained that we are holding sedation and trying to let her wake up.  Will take time. Noted to be  In A fib with RVR.  Will plan on increasing her metoprolol to 50 BID. Will give 5 mg IV push now.     Critical care time 30 minutes.  Kiko Man MD

## 2019-08-07 ENCOUNTER — HOSPITAL ENCOUNTER (OUTPATIENT)
Dept: GENERAL RADIOLOGY | Facility: CLINIC | Age: 69
Discharge: HOME | End: 2019-08-09
Payer: MEDICARE

## 2019-08-07 ENCOUNTER — HOSPITAL ENCOUNTER (OUTPATIENT)
Age: 69
Discharge: HOME | End: 2019-08-09
Payer: MEDICARE

## 2019-08-07 DIAGNOSIS — R29.898 LEFT ARM WEAKNESS: ICD-10-CM

## 2019-08-07 DIAGNOSIS — M25.522 LEFT ELBOW PAIN: ICD-10-CM

## 2019-08-07 PROCEDURE — 73080 X-RAY EXAM OF ELBOW: CPT

## 2019-08-07 PROCEDURE — 73060 X-RAY EXAM OF HUMERUS: CPT

## 2019-08-08 ENCOUNTER — OFFICE VISIT (OUTPATIENT)
Dept: ORTHOPEDIC SURGERY | Age: 69
End: 2019-08-08
Payer: MEDICARE

## 2019-08-08 VITALS — BODY MASS INDEX: 27.85 KG/M2 | HEIGHT: 69 IN | WEIGHT: 188 LBS

## 2019-08-08 DIAGNOSIS — S59.902A INJURY OF LEFT ELBOW, INITIAL ENCOUNTER: Primary | ICD-10-CM

## 2019-08-08 PROCEDURE — 99203 OFFICE O/P NEW LOW 30 MIN: CPT | Performed by: PHYSICIAN ASSISTANT

## 2019-08-08 NOTE — LETTER
[address]  [address] [facility] [address]  Phone: [phone]  Fax: [phone]    Araceli Ocasio        August 8, 2019     Patient: Holly Orozco   YOB: 1950   Date of Visit: 8/8/2019       To Whom It May Concern: It is my medical opinion that Rossy Mari should remain out of work until 8/29/19 and we will re-evaluate restrictions after MRI. If you have any questions or concerns, please don't hesitate to call.     Sincerely,          RASHEED Ocasio

## 2019-08-14 ENCOUNTER — HOSPITAL ENCOUNTER (OUTPATIENT)
Dept: MRI IMAGING | Facility: CLINIC | Age: 69
Discharge: HOME | End: 2019-08-16
Payer: MEDICARE

## 2019-08-14 DIAGNOSIS — S59.902A INJURY OF LEFT ELBOW, INITIAL ENCOUNTER: ICD-10-CM

## 2019-08-14 PROCEDURE — 73221 MRI JOINT UPR EXTREM W/O DYE: CPT

## 2019-08-16 ENCOUNTER — OFFICE VISIT (OUTPATIENT)
Dept: ORTHOPEDIC SURGERY | Age: 69
End: 2019-08-16
Payer: MEDICARE

## 2019-08-16 DIAGNOSIS — S46.212A RUPTURE OF LEFT DISTAL BICEPS TENDON, INITIAL ENCOUNTER: Primary | ICD-10-CM

## 2019-08-16 PROCEDURE — 99213 OFFICE O/P EST LOW 20 MIN: CPT | Performed by: ORTHOPAEDIC SURGERY

## 2019-09-19 ENCOUNTER — TELEPHONE (OUTPATIENT)
Dept: UROLOGY | Age: 69
End: 2019-09-19

## 2019-10-24 DIAGNOSIS — N13.8 HYPERTROPHY OF PROSTATE WITH URINARY OBSTRUCTION: ICD-10-CM

## 2019-10-24 DIAGNOSIS — N40.1 HYPERTROPHY OF PROSTATE WITH URINARY OBSTRUCTION: ICD-10-CM

## 2019-10-25 RX ORDER — TAMSULOSIN HYDROCHLORIDE 0.4 MG/1
CAPSULE ORAL
Qty: 180 CAPSULE | Refills: 3 | Status: SHIPPED | OUTPATIENT
Start: 2019-10-25 | End: 2020-10-27

## 2019-11-12 ENCOUNTER — HOSPITAL ENCOUNTER (OUTPATIENT)
Age: 69
Setting detail: SPECIMEN
Discharge: HOME | End: 2019-11-12
Payer: MEDICARE

## 2019-11-12 DIAGNOSIS — R97.20 ELEVATED PSA: ICD-10-CM

## 2019-11-12 LAB — PROSTATE SPECIFIC ANTIGEN: 6.93 UG/L

## 2019-11-19 ENCOUNTER — OFFICE VISIT (OUTPATIENT)
Dept: UROLOGY | Age: 69
End: 2019-11-19
Payer: MEDICARE

## 2019-11-19 VITALS
HEART RATE: 79 BPM | DIASTOLIC BLOOD PRESSURE: 84 MMHG | SYSTOLIC BLOOD PRESSURE: 160 MMHG | BODY MASS INDEX: 28.14 KG/M2 | HEIGHT: 69 IN | TEMPERATURE: 98.3 F | WEIGHT: 190 LBS

## 2019-11-19 DIAGNOSIS — R97.20 ELEVATED PSA: ICD-10-CM

## 2019-11-19 DIAGNOSIS — N52.01 ERECTILE DYSFUNCTION DUE TO ARTERIAL INSUFFICIENCY: ICD-10-CM

## 2019-11-19 DIAGNOSIS — N40.1 BPH WITH OBSTRUCTION/LOWER URINARY TRACT SYMPTOMS: Primary | ICD-10-CM

## 2019-11-19 DIAGNOSIS — N13.8 BPH WITH OBSTRUCTION/LOWER URINARY TRACT SYMPTOMS: Primary | ICD-10-CM

## 2019-11-19 PROCEDURE — 99214 OFFICE O/P EST MOD 30 MIN: CPT | Performed by: UROLOGY

## 2019-11-19 RX ORDER — TETRACYCLINE HCL 500 MG
1 CAPSULE ORAL DAILY
COMMUNITY
End: 2020-05-19

## 2019-11-19 ASSESSMENT — ENCOUNTER SYMPTOMS
WHEEZING: 0
VOMITING: 0
NAUSEA: 0
BACK PAIN: 0
EYE REDNESS: 0
SHORTNESS OF BREATH: 0
COUGH: 0
COLOR CHANGE: 0
EYE PAIN: 0
ABDOMINAL PAIN: 0

## 2019-12-17 ENCOUNTER — HOSPITAL ENCOUNTER (OUTPATIENT)
Age: 69
Setting detail: SPECIMEN
Discharge: HOME | End: 2019-12-17
Payer: MEDICARE

## 2019-12-17 DIAGNOSIS — Z11.59 NEED FOR HEPATITIS C SCREENING TEST: ICD-10-CM

## 2019-12-17 DIAGNOSIS — I10 ESSENTIAL HYPERTENSION: ICD-10-CM

## 2019-12-17 LAB
ABSOLUTE EOS #: 0.32 K/UL (ref 0–0.44)
ABSOLUTE IMMATURE GRANULOCYTE: 0.05 K/UL (ref 0–0.3)
ABSOLUTE LYMPH #: 1.82 K/UL (ref 1.1–3.7)
ABSOLUTE MONO #: 0.36 K/UL (ref 0.1–1.2)
ALBUMIN SERPL-MCNC: 4.5 G/DL (ref 3.5–5.2)
ALBUMIN/GLOBULIN RATIO: 1.7 (ref 1–2.5)
ALP BLD-CCNC: 46 U/L (ref 40–129)
ALT SERPL-CCNC: 21 U/L (ref 5–41)
ANION GAP SERPL CALCULATED.3IONS-SCNC: 14 MMOL/L (ref 9–17)
AST SERPL-CCNC: 20 U/L
BASOPHILS # BLD: 1 % (ref 0–2)
BASOPHILS ABSOLUTE: 0.07 K/UL (ref 0–0.2)
BILIRUB SERPL-MCNC: 0.54 MG/DL (ref 0.3–1.2)
BUN BLDV-MCNC: 16 MG/DL (ref 8–23)
BUN/CREAT BLD: ABNORMAL (ref 9–20)
CALCIUM SERPL-MCNC: 9.7 MG/DL (ref 8.6–10.4)
CHLORIDE BLD-SCNC: 105 MMOL/L (ref 98–107)
CHOLESTEROL/HDL RATIO: 4.7
CHOLESTEROL: 225 MG/DL
CO2: 27 MMOL/L (ref 20–31)
CREAT SERPL-MCNC: 0.8 MG/DL (ref 0.7–1.2)
DIFFERENTIAL TYPE: ABNORMAL
EOSINOPHILS RELATIVE PERCENT: 6 % (ref 1–4)
GFR AFRICAN AMERICAN: >60 ML/MIN
GFR NON-AFRICAN AMERICAN: >60 ML/MIN
GFR SERPL CREATININE-BSD FRML MDRD: ABNORMAL ML/MIN/{1.73_M2}
GFR SERPL CREATININE-BSD FRML MDRD: ABNORMAL ML/MIN/{1.73_M2}
GLUCOSE BLD-MCNC: 125 MG/DL (ref 70–99)
HCT VFR BLD CALC: 46.9 % (ref 40.7–50.3)
HDLC SERPL-MCNC: 48 MG/DL
HEMOGLOBIN: 15.2 G/DL (ref 13–17)
HEPATITIS C ANTIBODY: NONREACTIVE
IMMATURE GRANULOCYTES: 1 %
LDL CHOLESTEROL: 143 MG/DL (ref 0–130)
LYMPHOCYTES # BLD: 34 % (ref 24–43)
MCH RBC QN AUTO: 28.3 PG (ref 25.2–33.5)
MCHC RBC AUTO-ENTMCNC: 32.4 G/DL (ref 28.4–34.8)
MCV RBC AUTO: 87.3 FL (ref 82.6–102.9)
MONOCYTES # BLD: 7 % (ref 3–12)
NRBC AUTOMATED: 0 PER 100 WBC
PDW BLD-RTO: 14.4 % (ref 11.8–14.4)
PLATELET # BLD: 153 K/UL (ref 138–453)
PLATELET ESTIMATE: ABNORMAL
PMV BLD AUTO: 11.6 FL (ref 8.1–13.5)
POTASSIUM SERPL-SCNC: 4.5 MMOL/L (ref 3.7–5.3)
RBC # BLD: 5.37 M/UL (ref 4.21–5.77)
RBC # BLD: ABNORMAL 10*6/UL
SEG NEUTROPHILS: 51 % (ref 36–65)
SEGMENTED NEUTROPHILS ABSOLUTE COUNT: 2.76 K/UL (ref 1.5–8.1)
SODIUM BLD-SCNC: 146 MMOL/L (ref 135–144)
TOTAL PROTEIN: 7.1 G/DL (ref 6.4–8.3)
TRIGL SERPL-MCNC: 170 MG/DL
VLDLC SERPL CALC-MCNC: ABNORMAL MG/DL (ref 1–30)
WBC # BLD: 5.4 K/UL (ref 3.5–11.3)
WBC # BLD: ABNORMAL 10*3/UL

## 2020-05-12 ENCOUNTER — HOSPITAL ENCOUNTER (OUTPATIENT)
Age: 70
Setting detail: SPECIMEN
Discharge: HOME | End: 2020-05-12
Payer: MEDICARE

## 2020-05-12 LAB — PROSTATE SPECIFIC ANTIGEN: 7.19 UG/L

## 2020-05-19 ENCOUNTER — VIRTUAL VISIT (OUTPATIENT)
Dept: UROLOGY | Age: 70
End: 2020-05-19
Payer: MEDICARE

## 2020-05-19 PROCEDURE — 99214 OFFICE O/P EST MOD 30 MIN: CPT | Performed by: UROLOGY

## 2020-05-19 RX ORDER — TADALAFIL 5 MG/1
5 TABLET ORAL DAILY
Qty: 90 TABLET | Refills: 3 | Status: SHIPPED | OUTPATIENT
Start: 2020-05-19 | End: 2021-05-25 | Stop reason: SDUPTHER

## 2020-05-19 RX ORDER — SILDENAFIL 100 MG/1
100 TABLET, FILM COATED ORAL PRN
Qty: 30 TABLET | Refills: 3 | Status: CANCELLED | OUTPATIENT
Start: 2020-05-19

## 2020-05-19 ASSESSMENT — ENCOUNTER SYMPTOMS
WHEEZING: 0
NAUSEA: 0
COLOR CHANGE: 0
SHORTNESS OF BREATH: 0
ABDOMINAL PAIN: 0
BACK PAIN: 0
COUGH: 0
VOMITING: 0
EYE REDNESS: 0
EYE PAIN: 0

## 2020-05-19 NOTE — PROGRESS NOTES
[facility]  [facility] UROLOGY SPECIALISTS - [facility]  [address] [place] [id number]  Dept: [address] [facility] Urology Office Note - Established    Patient:  Chente Loomis  YOB: 1950  Date: 5/19/2020    The patient is a 81 y.o. male who presents todayfor evaluation of the following problems:   Chief Complaint   Patient presents with    Elevated PSA     doxy. me visit; 6 mo f/u with PSA, no urinary complaints        HPI  He is following up for BPH and ED. He has been very happy with Flomax. His stream is stronger and he is emptying better. He feels like he is going less frequently as well. He tolerates it very well. He sleeps through the night, which has improved from 2-3. He is on Viagra 100mg, which he tolerates well. The results are mixed. He has a known elevated PSA and has been reluctant to undergo a biopsy. He had a Select MDx, which showed a very low risk of significant cancer on biopsy. His most recent PSA has gone up from 6.9 to 7.1. Summary of old records: N/A    Additional History: N/A    Procedures Today: N/A    Urinalysis today:  No results found for this visit on 05/19/20.   Last several PSA's:  Lab Results   Component Value Date    PSA 7.19 (H) 05/12/2020    PSA 6.93 (H) 11/12/2019    PSA 6.64 (H) 03/21/2019    PSA 6.1 (H) 03/21/2019     Last total testosterone:  Lab Results   Component Value Date    TESTOSTERONE 437 09/18/2013       AUA Symptom Score (5/19/2020):                               Last BUN and creatinine:  Lab Results   Component Value Date    BUN 16 12/17/2019     Lab Results   Component Value Date    CREATININE 0.80 12/17/2019       Additional Lab/Culture results: none    Imaging Reviewed during this Office Visit: none  (results were independently reviewed by physician and radiology report verified)    PAST MEDICAL, FAMILY AND SOCIAL HISTORY UPDATE:  Past Medical History:   Diagnosis Date    Basal cell carcinoma     Erectile dysfunction     Hypertension     Precancerous skin lesion     Squamous cell carcinoma      Past Surgical History:   Procedure Laterality Date    MALIGNANT SKIN LESION EXCISION      MOHS SURGERY      PRE-MALIGNANT / BENIGN SKIN LESION EXCISION       Family History   Problem Relation Age of Onset    High Blood Pressure Mother     Diabetes Neg Hx      Outpatient Medications Marked as Taking for the 5/19/20 encounter (Virtual Visit) with Debrah Bamberger, MD   Medication Sig Dispense Refill    tadalafil (CIALIS) 5 MG tablet Take 1 tablet by mouth daily 90 tablet 3    lisinopril-hydrochlorothiazide (PRINZIDE;ZESTORETIC) 10-12.5 MG per tablet TAKE 1 TABLET DAILY 90 tablet 4    tamsulosin (FLOMAX) 0.4 MG capsule TAKE 2 CAPSULES DAILY 180 capsule 3    sildenafil (VIAGRA) 100 MG tablet Take 1 tablet by mouth as needed for Erectile Dysfunction 30 tablet 3    vitamin B-12 (CYANOCOBALAMIN) 1000 MCG tablet Take 1,000 mcg by mouth daily      Psyllium (METAMUCIL PO) Take by mouth      Bioflavonoid Products (KATHRYN-C) 500-200-60 MG TABS Take 1 tablet by mouth daily         Influenza vaccines  Social History     Tobacco Use   Smoking Status Never Smoker   Smokeless Tobacco Never Used     (Ifpatient a smoker, smoking cessation counseling offered)    Social History     Substance and Sexual Activity   Alcohol Use Yes    Alcohol/week: 0.0 standard drinks    Comment: socially       REVIEW OF SYSTEMS:  Review of Systems    Physical Exam:    There were no vitals filed for this visit. There is no height or weight on file to calculate BMI. Patient is a 81 y.o. male in no acute distress and alert and oriented to person, place and time. Physical Exam  Constitutional: Patient in no acute distress. Neuro: Alert and oriented to person, place and time.   Psych: Mood normal, affect normal  Skin: No rash noted  HEENT: Head: Normocephalic andatraumatic  Conjunctivae and EOM are normal. Pupils are equal, round  Nose:Normal  Right External Ear: Normal; Left External Ear: Normal  Mouth: Mucosa Moist  Neck: Supple  Lungs: Respiratory effort is normal      Assessment and Plan      1. BPH with obstruction/lower urinary tract symptoms    2. Erectile dysfunction, unspecified erectile dysfunction type    3. Elevated PSA           Plan:          Flomax continues to work very well  Will start Cialis  PSA stable  F/U in 6 months with a PSA. Return in about 6 months (around 11/19/2020) for Labs. Prescriptions Ordered:  Orders Placed This Encounter   Medications    tadalafil (CIALIS) 5 MG tablet     Sig: Take 1 tablet by mouth daily     Dispense:  90 tablet     Refill:  3     Orders Placed:  Orders Placed This Encounter   Procedures    PSA, Diagnostic     Standing Status:   Future     Standing Expiration Date:   5/19/2021           Gentry Harrington MD    Agree with the ROS entered by the MA.

## 2020-05-19 NOTE — PROGRESS NOTES
Review of Systems   Constitutional: Negative for activity change, chills and fever. Eyes: Negative for pain, redness and visual disturbance. Respiratory: Negative for cough, shortness of breath and wheezing. Cardiovascular: Negative for chest pain and leg swelling. Gastrointestinal: Negative for abdominal pain, nausea and vomiting. Genitourinary: Negative for difficulty urinating, discharge, dysuria, flank pain, frequency, hematuria, scrotal swelling and testicular pain. Musculoskeletal: Negative for back pain, joint swelling and myalgias. Skin: Negative for color change and rash. Neurological: Negative for dizziness, tremors and numbness. Hematological: Negative for adenopathy. Does not bruise/bleed easily. Denver Spare is a 81 y.o. male being evaluated by a Virtual Visit (video visit) encounter to address concerns as mentioned above. A caregiver was present when appropriate. Due to this being a TeleHealth encounter (During [facility]- public health emergency), evaluation of the following organ systems was limited: Vitals/Constitutional/EENT/Resp/CV/GI//MS/Neuro/Skin/Heme-Lymph-Imm. Pursuant to the emergency declaration under the [address] [place] [address], [address] [place] [address] authority and the [employer] and [employer] Act, this Virtual Visit was conducted with patient's (and/or legal guardian's) consent, to reduce the patient's risk of exposure to COVID-19 and provide necessary medical care. The patient (and/or legal guardian) has also been advised to contact this office for worsening conditions or problems, and seek emergency medical treatment and/or call 911 if deemed necessary. Patient identification was verified at the start of the visit: Yes    Total time spent for this encounter: Not billed by time    Services were provided through a video synchronous discussion virtually to substitute for in-person clinic visit.  Patient and provider were located at their individual homes. --Ai Durbin MA on 5/19/2020 at 11:35 AM    An electronic signature was used to authenticate this note.

## 2020-07-22 ENCOUNTER — HOSPITAL ENCOUNTER (OUTPATIENT)
Age: 70
Setting detail: SPECIMEN
Discharge: HOME | End: 2020-07-22
Payer: MEDICARE

## 2020-07-22 LAB
ABSOLUTE EOS #: 0.24 K/UL (ref 0–0.44)
ABSOLUTE IMMATURE GRANULOCYTE: <0.03 K/UL (ref 0–0.3)
ABSOLUTE LYMPH #: 1.69 K/UL (ref 1.1–3.7)
ABSOLUTE MONO #: 0.36 K/UL (ref 0.1–1.2)
ALBUMIN SERPL-MCNC: 4.3 G/DL (ref 3.5–5.2)
ALBUMIN/GLOBULIN RATIO: 1.7 (ref 1–2.5)
ALP BLD-CCNC: 46 U/L (ref 40–129)
ALT SERPL-CCNC: 23 U/L (ref 5–41)
ANION GAP SERPL CALCULATED.3IONS-SCNC: 9 MMOL/L (ref 9–17)
AST SERPL-CCNC: 27 U/L
BASOPHILS # BLD: 1 % (ref 0–2)
BASOPHILS ABSOLUTE: 0.05 K/UL (ref 0–0.2)
BILIRUB SERPL-MCNC: 0.52 MG/DL (ref 0.3–1.2)
BUN BLDV-MCNC: 17 MG/DL (ref 8–23)
BUN/CREAT BLD: ABNORMAL (ref 9–20)
CALCIUM SERPL-MCNC: 9.1 MG/DL (ref 8.6–10.4)
CHLORIDE BLD-SCNC: 102 MMOL/L (ref 98–107)
CHOLESTEROL/HDL RATIO: 4.3
CHOLESTEROL: 204 MG/DL
CO2: 27 MMOL/L (ref 20–31)
CREAT SERPL-MCNC: 0.9 MG/DL (ref 0.7–1.2)
DIFFERENTIAL TYPE: ABNORMAL
EOSINOPHILS RELATIVE PERCENT: 5 % (ref 1–4)
FOLATE: 15.2 NG/ML
GFR AFRICAN AMERICAN: >60 ML/MIN
GFR NON-AFRICAN AMERICAN: >60 ML/MIN
GFR SERPL CREATININE-BSD FRML MDRD: ABNORMAL ML/MIN/{1.73_M2}
GFR SERPL CREATININE-BSD FRML MDRD: ABNORMAL ML/MIN/{1.73_M2}
GLUCOSE BLD-MCNC: 113 MG/DL (ref 70–99)
HCT VFR BLD CALC: 47.4 % (ref 40.7–50.3)
HDLC SERPL-MCNC: 47 MG/DL
HEMOGLOBIN: 15.5 G/DL (ref 13–17)
IMMATURE GRANULOCYTES: 1 %
LDL CHOLESTEROL: 143 MG/DL (ref 0–130)
LYMPHOCYTES # BLD: 38 % (ref 24–43)
MCH RBC QN AUTO: 28.6 PG (ref 25.2–33.5)
MCHC RBC AUTO-ENTMCNC: 32.7 G/DL (ref 28.4–34.8)
MCV RBC AUTO: 87.5 FL (ref 82.6–102.9)
MONOCYTES # BLD: 8 % (ref 3–12)
NRBC AUTOMATED: 0 PER 100 WBC
PDW BLD-RTO: 14.3 % (ref 11.8–14.4)
PLATELET # BLD: 138 K/UL (ref 138–453)
PLATELET ESTIMATE: ABNORMAL
PMV BLD AUTO: 11.2 FL (ref 8.1–13.5)
POTASSIUM SERPL-SCNC: 4 MMOL/L (ref 3.7–5.3)
RBC # BLD: 5.42 M/UL (ref 4.21–5.77)
RBC # BLD: ABNORMAL 10*6/UL
SEG NEUTROPHILS: 47 % (ref 36–65)
SEGMENTED NEUTROPHILS ABSOLUTE COUNT: 2.07 K/UL (ref 1.5–8.1)
SODIUM BLD-SCNC: 138 MMOL/L (ref 135–144)
TOTAL PROTEIN: 6.8 G/DL (ref 6.4–8.3)
TRIGL SERPL-MCNC: 72 MG/DL
VITAMIN B-12: 715 PG/ML (ref 232–1245)
VLDLC SERPL CALC-MCNC: ABNORMAL MG/DL (ref 1–30)
WBC # BLD: 4.4 K/UL (ref 3.5–11.3)
WBC # BLD: ABNORMAL 10*3/UL

## 2020-10-27 RX ORDER — TAMSULOSIN HYDROCHLORIDE 0.4 MG/1
CAPSULE ORAL
Qty: 180 CAPSULE | Refills: 3 | Status: SHIPPED | OUTPATIENT
Start: 2020-10-27 | End: 2021-11-17

## 2020-10-27 NOTE — TELEPHONE ENCOUNTER
Refill request received for tamsulosin. Pt was last seen via virtual visit on 5/19/20 and is due for 6 month follow-up on 11/24/20. Please advise.

## 2020-11-17 ENCOUNTER — HOSPITAL ENCOUNTER (OUTPATIENT)
Age: 70
Setting detail: SPECIMEN
Discharge: HOME | End: 2020-11-17
Payer: MEDICARE

## 2020-11-17 LAB — PROSTATE SPECIFIC ANTIGEN: 7.76 UG/L

## 2020-11-24 ENCOUNTER — OFFICE VISIT (OUTPATIENT)
Dept: UROLOGY | Age: 70
End: 2020-11-24
Payer: MEDICARE

## 2020-11-24 VITALS
DIASTOLIC BLOOD PRESSURE: 88 MMHG | SYSTOLIC BLOOD PRESSURE: 186 MMHG | BODY MASS INDEX: 28.44 KG/M2 | TEMPERATURE: 98.9 F | WEIGHT: 192 LBS | HEIGHT: 69 IN | HEART RATE: 70 BPM

## 2020-11-24 PROCEDURE — 99214 OFFICE O/P EST MOD 30 MIN: CPT | Performed by: UROLOGY

## 2020-11-24 RX ORDER — AMPICILLIN TRIHYDRATE 250 MG
CAPSULE ORAL
COMMUNITY
Start: 2020-07-01 | End: 2021-01-18 | Stop reason: SDUPTHER

## 2020-11-24 ASSESSMENT — ENCOUNTER SYMPTOMS
NAUSEA: 0
DIARRHEA: 0
ABDOMINAL PAIN: 0
CONSTIPATION: 0
EYE PAIN: 0
VOMITING: 0
BACK PAIN: 0
SHORTNESS OF BREATH: 0
EYE REDNESS: 0
WHEEZING: 0
COUGH: 0

## 2020-11-24 NOTE — PROGRESS NOTES
[address] [address]  Dept: [address] [facility] Urology Office Note - Established    Patient:  Jessica Macias  YOB: 1950  Date: 11/24/2020    The patient is a 78 y.o. male who presents todayfor evaluation of the following problems:   Chief Complaint   Patient presents with    Benign Prostatic Hypertrophy     6 months with PSA     Elevated PSA       HPI  He is here in follow up for BPH and ED. He has been on Flomax, which was doing well. He was started on Cialis. He is taking it every day, and has seen a small improvement with voiding. When he increases the dose of the Cialis, he does better with ED. He has a known history of elevated PSA. He has been reluctant to undergo a biopsy. He had a select MDx, which was low risk. Summary of old records: N/A    Additional History: N/A    Procedures Today: N/A    Urinalysis today:  No results found for this visit on 11/24/20.   Last several PSA's:  Lab Results   Component Value Date    PSA 7.76 (H) 11/17/2020    PSA 7.19 (H) 05/12/2020    PSA 6.93 (H) 11/12/2019     Last total testosterone:  Lab Results   Component Value Date    TESTOSTERONE 437 09/18/2013       AUA Symptom Score (11/24/2020):  INCOMPLETE EMPTYING: How often have you had the sensation of not emptying your bladder?: Less than 1 to 5 times  FREQUENCY: How often do you have to urinate less than every two hours?: Less than 1 to 5 times  INTERMITTENCY: How often have you found you stopped and started again several times when you urinated?: Less than 1 to 5 times  URGENCY: How often have you found it difficult to postpone urination?: Not at all  WEAK STREAM: How often have you had a weak urinary stream?: Less than 1 to 5 times  STRAINING: How often have you had to strain to start  urination?: Not at all  NOCTURIA: How many times did you typically get up at night to uriniate?: 1 Time  TOTAL I-PSS SCORE[de-identified] 5  How would you feel if you were to spend the rest of your life with your urinary condition?: Mostly Satisfied    Last BUN and creatinine:  Lab Results   Component Value Date    BUN 17 07/22/2020     Lab Results   Component Value Date    CREATININE 0.90 07/22/2020       Additional Lab/Culture results: none    Imaging Reviewed during this Office Visit: none    (results were independently reviewed by physician and radiology report verified)    PAST MEDICAL, FAMILY AND SOCIAL HISTORY UPDATE:  Past Medical History:   Diagnosis Date    Basal cell carcinoma     Erectile dysfunction     Hypertension     Precancerous skin lesion     Squamous cell carcinoma      Past Surgical History:   Procedure Laterality Date    MALIGNANT SKIN LESION EXCISION      MOHS SURGERY      PRE-MALIGNANT / BENIGN SKIN LESION EXCISION       Family History   Problem Relation Age of Onset    High Blood Pressure Mother     Diabetes Neg Hx      Outpatient Medications Marked as Taking for the 11/24/20 encounter (Office Visit) with Latoya Almazan MD   Medication Sig Dispense Refill    Red Yeast Rice 600 MG CAPS       Coenzyme Q10 (COQ-10) 100 MG CAPS       Cholecalciferol (VITAMIN D3) 25 MCG (1000 UT) CAPS       tamsulosin (FLOMAX) 0.4 MG capsule TAKE 2 CAPSULES DAILY 180 capsule 3    Multiple Vitamin (MULTI-DAY VITAMINS PO) Take by mouth Elmer men prostate and virillity      tadalafil (CIALIS) 5 MG tablet Take 1 tablet by mouth daily 90 tablet 3    lisinopril-hydrochlorothiazide (PRINZIDE;ZESTORETIC) 10-12.5 MG per tablet TAKE 1 TABLET DAILY 90 tablet 4    sildenafil (VIAGRA) 100 MG tablet Take 1 tablet by mouth as needed for Erectile Dysfunction 30 tablet 3    Psyllium (METAMUCIL PO) Take by mouth      Bioflavonoid Products (KATHRYN-C) 500-200-60 MG TABS Take 1 tablet by mouth daily         Influenza vaccines  Social History     Tobacco Use   Smoking Status Never Smoker   Smokeless Tobacco Never Used     (Ifpatient a smoker, smoking cessation counseling offered)    Social History     Substance and Sexual Activity   Alcohol Use Yes    Alcohol/week: 0.0 standard drinks    Comment: socially       REVIEW OF SYSTEMS:  Review of Systems    Physical Exam:      Vitals:    11/24/20 0830   BP: (!) 186/88   Pulse: 70   Temp: 98.9 °F (37.2 °C)     Body mass index is 28.35 kg/m². Patient is a 78 y.o. male in no acute distress and alert and oriented to person, place and time. Physical Exam  Constitutional: Patient in no acute distress. Neuro: Alert and oriented to person, place and time. Psych: Mood normal, affect normal  Lungs: Respiratory effort is normal  Cardiovascular: Warm & Pink  Abdomen: Soft, non-tender, non-distended with no CVA,  No flank tenderness,  Or hepatosplenomegaly   Lymphatics: No palpablelymphadenopathy. Bladder non-tender and not distended. Musculoskeletal: Normal gait and station  Testiscles: Normal, bilaterally      Assessment and Plan      1. BPH with obstruction/lower urinary tract symptoms    2. Erectile dysfunction, unspecified erectile dysfunction type    3. Elevated PSA           Plan:          He is doing well with voiding, continue Flomax. Continue Cialis for ED. PSA went up slightly, he is still reluctant to undergo a biopsy. Will repeat PSA in 6 months. Return in about 6 months (around 5/24/2021) for Labs. Prescriptions Ordered:  No orders of the defined types were placed in this encounter. Orders Placed:  Orders Placed This Encounter   Procedures    PSA, Diagnostic     Standing Status:   Future     Standing Expiration Date:   11/24/2021           Maria Luisa Mayo MD    Agree with the ROS entered by the MA.

## 2021-01-08 ENCOUNTER — HOSPITAL ENCOUNTER (OUTPATIENT)
Age: 71
Setting detail: SPECIMEN
Discharge: HOME | End: 2021-01-08
Payer: MEDICARE

## 2021-01-08 DIAGNOSIS — I10 ESSENTIAL HYPERTENSION: ICD-10-CM

## 2021-01-08 DIAGNOSIS — R73.9 HYPERGLYCEMIA: ICD-10-CM

## 2021-01-08 DIAGNOSIS — E78.49 OTHER HYPERLIPIDEMIA: ICD-10-CM

## 2021-01-08 LAB
ABSOLUTE EOS #: 0.31 K/UL (ref 0–0.44)
ABSOLUTE IMMATURE GRANULOCYTE: 0.03 K/UL (ref 0–0.3)
ABSOLUTE LYMPH #: 1.66 K/UL (ref 1.1–3.7)
ABSOLUTE MONO #: 0.36 K/UL (ref 0.1–1.2)
ALBUMIN SERPL-MCNC: 4.6 G/DL (ref 3.5–5.2)
ALBUMIN/GLOBULIN RATIO: 1.8 (ref 1–2.5)
ALP BLD-CCNC: 48 U/L (ref 40–129)
ALT SERPL-CCNC: 22 U/L (ref 5–41)
ANION GAP SERPL CALCULATED.3IONS-SCNC: 11 MMOL/L (ref 9–17)
AST SERPL-CCNC: 26 U/L
BASOPHILS # BLD: 1 % (ref 0–2)
BASOPHILS ABSOLUTE: 0.05 K/UL (ref 0–0.2)
BILIRUB SERPL-MCNC: 0.59 MG/DL (ref 0.3–1.2)
BUN BLDV-MCNC: 20 MG/DL (ref 8–23)
BUN/CREAT BLD: NORMAL (ref 9–20)
CALCIUM SERPL-MCNC: 9.4 MG/DL (ref 8.6–10.4)
CHLORIDE BLD-SCNC: 101 MMOL/L (ref 98–107)
CHOLESTEROL/HDL RATIO: 5
CHOLESTEROL: 253 MG/DL
CO2: 26 MMOL/L (ref 20–31)
CREAT SERPL-MCNC: 0.89 MG/DL (ref 0.7–1.2)
DIFFERENTIAL TYPE: ABNORMAL
EOSINOPHILS RELATIVE PERCENT: 6 % (ref 1–4)
ESTIMATED AVERAGE GLUCOSE: 111 MG/DL
GFR AFRICAN AMERICAN: >60 ML/MIN
GFR NON-AFRICAN AMERICAN: >60 ML/MIN
GFR SERPL CREATININE-BSD FRML MDRD: NORMAL ML/MIN/{1.73_M2}
GFR SERPL CREATININE-BSD FRML MDRD: NORMAL ML/MIN/{1.73_M2}
GLUCOSE BLD-MCNC: 74 MG/DL (ref 70–99)
HBA1C MFR BLD: 5.5 % (ref 4–6)
HCT VFR BLD CALC: 48.9 % (ref 40.7–50.3)
HDLC SERPL-MCNC: 51 MG/DL
HEMOGLOBIN: 16 G/DL (ref 13–17)
IMMATURE GRANULOCYTES: 1 %
LDL CHOLESTEROL: 187 MG/DL (ref 0–130)
LYMPHOCYTES # BLD: 33 % (ref 24–43)
MCH RBC QN AUTO: 27.9 PG (ref 25.2–33.5)
MCHC RBC AUTO-ENTMCNC: 32.7 G/DL (ref 28.4–34.8)
MCV RBC AUTO: 85.2 FL (ref 82.6–102.9)
MONOCYTES # BLD: 7 % (ref 3–12)
NRBC AUTOMATED: 0 PER 100 WBC
PDW BLD-RTO: 14 % (ref 11.8–14.4)
PLATELET # BLD: 148 K/UL (ref 138–453)
PLATELET ESTIMATE: ABNORMAL
PMV BLD AUTO: 11.7 FL (ref 8.1–13.5)
POTASSIUM SERPL-SCNC: 4.6 MMOL/L (ref 3.7–5.3)
RBC # BLD: 5.74 M/UL (ref 4.21–5.77)
RBC # BLD: ABNORMAL 10*6/UL
SEG NEUTROPHILS: 52 % (ref 36–65)
SEGMENTED NEUTROPHILS ABSOLUTE COUNT: 2.7 K/UL (ref 1.5–8.1)
SODIUM BLD-SCNC: 138 MMOL/L (ref 135–144)
TOTAL PROTEIN: 7.2 G/DL (ref 6.4–8.3)
TRIGL SERPL-MCNC: 76 MG/DL
VLDLC SERPL CALC-MCNC: ABNORMAL MG/DL (ref 1–30)
WBC # BLD: 5.1 K/UL (ref 3.5–11.3)
WBC # BLD: ABNORMAL 10*3/UL

## 2021-05-18 ENCOUNTER — HOSPITAL ENCOUNTER (OUTPATIENT)
Age: 71
Setting detail: SPECIMEN
Discharge: HOME | End: 2021-05-18
Payer: MEDICARE

## 2021-05-18 DIAGNOSIS — R97.20 ELEVATED PSA: ICD-10-CM

## 2021-05-18 LAB — PROSTATE SPECIFIC ANTIGEN: 8.51 UG/L

## 2021-05-25 ENCOUNTER — OFFICE VISIT (OUTPATIENT)
Dept: UROLOGY | Age: 71
End: 2021-05-25
Payer: MEDICARE

## 2021-05-25 VITALS
HEIGHT: 69 IN | TEMPERATURE: 97.1 F | WEIGHT: 184 LBS | HEART RATE: 66 BPM | SYSTOLIC BLOOD PRESSURE: 175 MMHG | DIASTOLIC BLOOD PRESSURE: 84 MMHG | BODY MASS INDEX: 27.25 KG/M2

## 2021-05-25 DIAGNOSIS — N40.1 BPH WITH OBSTRUCTION/LOWER URINARY TRACT SYMPTOMS: ICD-10-CM

## 2021-05-25 DIAGNOSIS — R97.20 ELEVATED PSA: Primary | ICD-10-CM

## 2021-05-25 DIAGNOSIS — N52.9 ERECTILE DYSFUNCTION, UNSPECIFIED ERECTILE DYSFUNCTION TYPE: ICD-10-CM

## 2021-05-25 DIAGNOSIS — N13.8 BPH WITH OBSTRUCTION/LOWER URINARY TRACT SYMPTOMS: ICD-10-CM

## 2021-05-25 PROCEDURE — 99214 OFFICE O/P EST MOD 30 MIN: CPT | Performed by: UROLOGY

## 2021-05-25 RX ORDER — TADALAFIL 5 MG/1
5 TABLET ORAL DAILY
Qty: 90 TABLET | Refills: 3 | Status: SHIPPED | OUTPATIENT
Start: 2021-05-25 | End: 2022-03-08 | Stop reason: SDUPTHER

## 2021-05-25 ASSESSMENT — ENCOUNTER SYMPTOMS
CONSTIPATION: 0
ABDOMINAL PAIN: 0
SHORTNESS OF BREATH: 0
EYE REDNESS: 0
BACK PAIN: 0
NAUSEA: 0
VOMITING: 0
DIARRHEA: 0
COUGH: 0
WHEEZING: 0
EYE PAIN: 0

## 2021-05-25 NOTE — LETTER
[address] [address]  Dept: [phone]  Dept Fax: [phone]        5/25/21    Patient: Melchor Baldwin  YOB: 1950    Dear TARA Somers CNP,    I had the pleasure of seeing one of your patients, Philomena Malloy today in the office today. Below are the relevant portions of my assessment and plan of care. IMPRESSION:  1. Elevated PSA    2. Erectile dysfunction, unspecified erectile dysfunction type    3. BPH with obstruction/lower urinary tract symptoms        PLAN:  He is doing well with Flomax. We discussed increasing Cialis dose as needed. Will obtain Prostate MRI for worsening PSA. Return in about 6 weeks (around 7/6/2021). Prescriptions Ordered:  Orders Placed This Encounter   Medications    tadalafil (CIALIS) 5 MG tablet     Sig: Take 1 tablet by mouth daily     Dispense:  90 tablet     Refill:  3     Orders Placed:  Orders Placed This Encounter   Procedures    MRI PROSTATE W WO CONTRAST     Standing Status:   Future     Standing Expiration Date:   5/25/2022        Thank you for allowing me to participate in the care of this patient. I will keep you updated on this patient's follow up and I look forward to serving you and your patients again in the future.         Carla Palomares MD

## 2021-06-21 ENCOUNTER — HOSPITAL ENCOUNTER (OUTPATIENT)
Dept: MRI IMAGING | Age: 71
Discharge: HOME | End: 2021-06-23
Payer: MEDICARE

## 2021-06-21 DIAGNOSIS — R97.20 ELEVATED PSA: ICD-10-CM

## 2021-06-21 LAB
BUN BLDV-MCNC: 16 MG/DL (ref 8–23)
CREAT SERPL-MCNC: 0.81 MG/DL (ref 0.7–1.2)
GFR AFRICAN AMERICAN: >60 ML/MIN
GFR NON-AFRICAN AMERICAN: >60 ML/MIN
GFR SERPL CREATININE-BSD FRML MDRD: NORMAL ML/MIN/{1.73_M2}
GFR SERPL CREATININE-BSD FRML MDRD: NORMAL ML/MIN/{1.73_M2}

## 2021-06-21 PROCEDURE — 82565 ASSAY OF CREATININE: CPT

## 2021-06-21 PROCEDURE — 2580000003 HC RX 258: Performed by: UROLOGY

## 2021-06-21 PROCEDURE — 6360000004 HC RX CONTRAST MEDICATION: Performed by: UROLOGY

## 2021-06-21 PROCEDURE — 84520 ASSAY OF UREA NITROGEN: CPT

## 2021-06-21 PROCEDURE — 72197 MRI PELVIS W/O & W/DYE: CPT

## 2021-06-21 PROCEDURE — 36415 COLL VENOUS BLD VENIPUNCTURE: CPT

## 2021-06-21 PROCEDURE — A9579 GAD-BASE MR CONTRAST NOS,1ML: HCPCS | Performed by: UROLOGY

## 2021-06-21 RX ORDER — SODIUM CHLORIDE 0.9 % (FLUSH) 0.9 %
10 SYRINGE (ML) INJECTION PRN
Status: DISCONTINUED | OUTPATIENT
Start: 2021-06-21 | End: 2021-06-24 | Stop reason: HOSPADM

## 2021-06-21 RX ORDER — 0.9 % SODIUM CHLORIDE 0.9 %
30 INTRAVENOUS SOLUTION INTRAVENOUS ONCE
Status: DISCONTINUED | OUTPATIENT
Start: 2021-06-21 | End: 2021-06-24 | Stop reason: HOSPADM

## 2021-06-21 RX ADMIN — GADOTERIDOL 17 ML: 279.3 INJECTION, SOLUTION INTRAVENOUS at 08:39

## 2021-06-21 RX ADMIN — SODIUM CHLORIDE, PRESERVATIVE FREE 10 ML: 5 INJECTION INTRAVENOUS at 08:39

## 2021-07-06 ENCOUNTER — OFFICE VISIT (OUTPATIENT)
Dept: UROLOGY | Age: 71
End: 2021-07-06
Payer: MEDICARE

## 2021-07-06 ENCOUNTER — HOSPITAL ENCOUNTER (OUTPATIENT)
Age: 71
Setting detail: SPECIMEN
Discharge: HOME | End: 2021-07-06
Payer: MEDICARE

## 2021-07-06 VITALS
WEIGHT: 184 LBS | TEMPERATURE: 97.7 F | HEIGHT: 69 IN | HEART RATE: 66 BPM | BODY MASS INDEX: 27.25 KG/M2 | SYSTOLIC BLOOD PRESSURE: 178 MMHG | DIASTOLIC BLOOD PRESSURE: 82 MMHG

## 2021-07-06 DIAGNOSIS — R97.20 ELEVATED PSA: ICD-10-CM

## 2021-07-06 DIAGNOSIS — R93.89 ABNORMAL MRI: Primary | ICD-10-CM

## 2021-07-06 PROCEDURE — 99214 OFFICE O/P EST MOD 30 MIN: CPT | Performed by: UROLOGY

## 2021-07-06 ASSESSMENT — ENCOUNTER SYMPTOMS
BACK PAIN: 0
CONSTIPATION: 0
WHEEZING: 0
VOMITING: 0
NAUSEA: 0
DIARRHEA: 0
EYE REDNESS: 0
SHORTNESS OF BREATH: 0
EYE PAIN: 0
ABDOMINAL PAIN: 0
COUGH: 0

## 2021-07-06 NOTE — LETTER
[address] [address]  Dept: [phone]  Dept Fax: [phone]        7/6/21    Patient: Yuli Jimenez  YOB: 1950    Dear TARA Alarcon CNP,    I had the pleasure of seeing one of your patients, Tyrell Ledezma today in the office today. Below are the relevant portions of my assessment and plan of care. IMPRESSION:  1. Abnormal MRI    2. Elevated PSA        PLAN:  Has had a rising PSA. MRI shows a PI RADS 4 lesion. Will move forward with Fusion biopsy. Return in about 6 weeks (around 8/17/2021) for Fusion biopsy. .    Prescriptions Ordered:  No orders of the defined types were placed in this encounter. Orders Placed:  No orders of the defined types were placed in this encounter. Thank you for allowing me to participate in the care of this patient. I will keep you updated on this patient's follow up and I look forward to serving you and your patients again in the future.         Esdras Tomas MD

## 2021-07-06 NOTE — PROGRESS NOTES
[address] [address]  Dept:  [facility] Urology Office Note - Established    Patient:  Roderick Maxwell  YOB: 1950  Date: 7/6/2021    The patient is a 73 y.o. male who presents todayfor evaluation of the following problems:   Chief Complaint   Patient presents with    Results     MRI        HPI  He is here in follow up for elevated PSA. He has had a rising PSA, and has been reluctant to have a biopsy. He had an MRI, which does show a PI RADS 4 lesion. He has minimal voiding complaints with Flomax. His volume was 49ml. Summary of old records: N/A    Additional History: N/A    Procedures Today: N/A    Urinalysis today:  No results found for this visit on 07/06/21. Last several PSA's:  Lab Results   Component Value Date    PSA 8.51 (H) 05/18/2021    PSA 7.76 (H) 11/17/2020    PSA 7.19 (H) 05/12/2020     Last total testosterone:  Lab Results   Component Value Date    TESTOSTERONE 437 09/18/2013       AUA Symptom Score (7/6/2021):   INCOMPLETE EMPTYING: How often have you had the sensation of not emptying your bladder?: Not at all  FREQUENCY: How often do you have to urinate less than every two hours?: Not at all  INTERMITTENCY: How often have you found you stopped and started again several times when you urinated?: Not at all  URGENCY: How often have you found it difficult to postpone urination?: Not at all  WEAK STREAM: How often have you had a weak urinary stream?: Less than 1 to 5 times  STRAINING: How often have you had to strain to start  urination?: Not at all  NOCTURIA: How many times did you typically get up at night to uriniate?: 1 Time  TOTAL I-PSS SCORE[de-identified] 2  How would you feel if you were to spend the rest of your life with your urinary condition?: Mostly Satisfied    Last BUN and creatinine:  Lab Results   Component Value Date    BUN 16 06/21/2021     Lab Results   Component Value Date    CREATININE 0.81 06/21/2021       Additional Lab/Culture results: none    Imaging Reviewed during this Office Visit: MRI images reviewed, PIRADS 4 lesion noted.    (results were independently reviewed by physician and radiology report verified)    PAST MEDICAL, FAMILY AND SOCIAL HISTORY UPDATE:  Past Medical History:   Diagnosis Date    Basal cell carcinoma     Erectile dysfunction     Hypertension     Precancerous skin lesion     Squamous cell carcinoma      Past Surgical History:   Procedure Laterality Date    MALIGNANT SKIN LESION EXCISION      MOHS SURGERY      PRE-MALIGNANT / BENIGN SKIN LESION EXCISION       Family History   Problem Relation Age of Onset    High Blood Pressure Mother     Diabetes Neg Hx      Outpatient Medications Marked as Taking for the 7/6/21 encounter (Office Visit) with Sigrid Paredes MD   Medication Sig Dispense Refill    tadalafil (CIALIS) 5 MG tablet Take 1 tablet by mouth daily 90 tablet 3    lisinopril-hydroCHLOROthiazide (PRINZIDE;ZESTORETIC) 10-12.5 MG per tablet TAKE 1 TABLET DAILY 90 tablet 1    Flaxseed, Linseed, (FLAXSEED OIL PO) Take 1,000 mLs by mouth      Red Yeast Rice 600 MG CAPS Take 1,200 mg by mouth 2 times daily 360 capsule 0    Coenzyme Q10 (COQ-10) 100 MG CAPS       Cholecalciferol (VITAMIN D3) 25 MCG (1000 UT) CAPS       tamsulosin (FLOMAX) 0.4 MG capsule TAKE 2 CAPSULES DAILY 180 capsule 3    Multiple Vitamin (MULTI-DAY VITAMINS PO) Take by mouth Elmer men prostate and virillity      Psyllium (METAMUCIL PO) Take by mouth      Bioflavonoid Products (KATHRYN-C) 500-200-60 MG TABS Take 1 tablet by mouth daily         Influenza vaccines  Social History     Tobacco Use   Smoking Status Never Smoker   Smokeless Tobacco Never Used     (Ifpatient a smoker, smoking cessation counseling offered)    Social History     Substance and Sexual Activity   Alcohol Use Yes    Alcohol/week: 0.0 standard drinks    Comment: socially       REVIEW OF SYSTEMS:  Review of Systems    Physical Exam:      Vitals:    07/06/21 1037   BP: (!) 178/82   Pulse: 66   Temp:      Body mass index is 27.17 kg/m². Patient is a 73 y.o. male in no acute distress and alert and oriented to person, place and time. Physical Exam  Constitutional: Patient in no acute distress. Neuro: Alert and oriented to person, place and time. Psych: Mood normal, affect normal  Lungs: Respiratory effort is normal  Cardiovascular: Warm & Pink  Abdomen: Soft, non-tender, non-distended with no CVA,  No flank tenderness,  Or hepatosplenomegaly   Lymphatics: No palpablelymphadenopathy. Bladder non-tender and not distended. Musculoskeletal: Normal gait and station      Assessment and Plan      1. Abnormal MRI    2. Elevated PSA           Plan:          Has had a rising PSA. MRI shows a PI RADS 4 lesion. Will move forward with Fusion biopsy. Return in about 6 weeks (around 8/17/2021) for Fusion biopsy. .    Prescriptions Ordered:  No orders of the defined types were placed in this encounter. Orders Placed:  No orders of the defined types were placed in this encounter. Ptael Denny MD    Agree with the ROS entered by the MA.

## 2021-07-12 LAB
FLUOROQUINOLONE RESISTANT ORG, CULT: NORMAL
ZZ NTE WITH NAME CLEAN UP: SPECIMEN SOURCE: NORMAL

## 2021-07-12 RX ORDER — CIPROFLOXACIN 500 MG/1
TABLET, FILM COATED ORAL
Qty: 6 TABLET | Refills: 0 | Status: SHIPPED | OUTPATIENT
Start: 2021-07-12 | End: 2021-12-07 | Stop reason: ALTCHOICE

## 2021-07-19 ENCOUNTER — HOSPITAL ENCOUNTER (OUTPATIENT)
Age: 71
Setting detail: SPECIMEN
Discharge: HOME | End: 2021-07-19
Payer: MEDICARE

## 2021-07-19 ENCOUNTER — TELEPHONE (OUTPATIENT)
Dept: UROLOGY | Age: 71
End: 2021-07-19

## 2021-07-19 DIAGNOSIS — E78.49 OTHER HYPERLIPIDEMIA: ICD-10-CM

## 2021-07-19 LAB
CHOLESTEROL/HDL RATIO: 3.8
CHOLESTEROL: 192 MG/DL
HDLC SERPL-MCNC: 51 MG/DL
LDL CHOLESTEROL: 123 MG/DL (ref 0–130)
TRIGL SERPL-MCNC: 90 MG/DL
VLDLC SERPL CALC-MCNC: NORMAL MG/DL (ref 1–30)

## 2021-07-19 NOTE — TELEPHONE ENCOUNTER
Fusion Prostate Bx & US @ [facility] 8/16/21 10:30am  PAT @ [facility] 8/2/21 9:30am      Spoke with patient regarding procedure information. **STOP BLOOD THINNERS** Fleets enema is to be done 3 hours prior to procedure. Procedure info emailed to pt on 7/19/21. Please contact patient regarding instructions on antibiotic prior to procedure.

## 2021-07-26 PROBLEM — D68.9 COAGULATION DEFECT (HCC): Status: ACTIVE | Noted: 2021-07-26

## 2021-07-29 RX ORDER — SODIUM CHLORIDE, SODIUM LACTATE, POTASSIUM CHLORIDE, CALCIUM CHLORIDE 600; 310; 30; 20 MG/100ML; MG/100ML; MG/100ML; MG/100ML
1000 INJECTION, SOLUTION INTRAVENOUS CONTINUOUS
Status: CANCELLED | OUTPATIENT
Start: 2021-07-29

## 2021-08-02 ENCOUNTER — HOSPITAL ENCOUNTER (OUTPATIENT)
Dept: PREADMISSION TESTING | Age: 71
Discharge: HOME | End: 2021-08-06
Payer: MEDICARE

## 2021-08-02 VITALS
HEIGHT: 69 IN | BODY MASS INDEX: 27.4 KG/M2 | HEART RATE: 71 BPM | TEMPERATURE: 98.1 F | WEIGHT: 185 LBS | SYSTOLIC BLOOD PRESSURE: 173 MMHG | OXYGEN SATURATION: 99 % | RESPIRATION RATE: 20 BRPM | DIASTOLIC BLOOD PRESSURE: 75 MMHG

## 2021-08-02 LAB
ANION GAP SERPL CALCULATED.3IONS-SCNC: 8 MMOL/L (ref 9–17)
BUN BLDV-MCNC: 13 MG/DL (ref 8–23)
CHLORIDE BLD-SCNC: 105 MMOL/L (ref 98–107)
CO2: 26 MMOL/L (ref 20–31)
CREAT SERPL-MCNC: 0.8 MG/DL (ref 0.7–1.2)
EKG ATRIAL RATE: 68 BPM
EKG P AXIS: 56 DEGREES
EKG P-R INTERVAL: 184 MS
EKG Q-T INTERVAL: 426 MS
EKG QRS DURATION: 98 MS
EKG QTC CALCULATION (BAZETT): 452 MS
EKG R AXIS: 4 DEGREES
EKG T AXIS: 4 DEGREES
EKG VENTRICULAR RATE: 68 BPM
GFR AFRICAN AMERICAN: >60 ML/MIN
GFR NON-AFRICAN AMERICAN: >60 ML/MIN
GFR SERPL CREATININE-BSD FRML MDRD: NORMAL ML/MIN/{1.73_M2}
GFR SERPL CREATININE-BSD FRML MDRD: NORMAL ML/MIN/{1.73_M2}
GLUCOSE BLD-MCNC: 150 MG/DL (ref 70–99)
HCT VFR BLD CALC: 45.4 % (ref 40.7–50.3)
HEMOGLOBIN: 14.8 G/DL (ref 13–17)
MCH RBC QN AUTO: 28.1 PG (ref 25.2–33.5)
MCHC RBC AUTO-ENTMCNC: 32.6 G/DL (ref 28.4–34.8)
MCV RBC AUTO: 86.1 FL (ref 82.6–102.9)
NRBC AUTOMATED: 0 PER 100 WBC
PDW BLD-RTO: 14.3 % (ref 11.8–14.4)
PLATELET # BLD: NORMAL K/UL (ref 138–453)
PLATELET, FLUORESCENCE: 122 K/UL (ref 138–453)
PLATELET, IMMATURE FRACTION: 4.1 % (ref 1.1–10.3)
PMV BLD AUTO: NORMAL FL (ref 8.1–13.5)
POTASSIUM SERPL-SCNC: 4.6 MMOL/L (ref 3.7–5.3)
RBC # BLD: 5.27 M/UL (ref 4.21–5.77)
SODIUM BLD-SCNC: 139 MMOL/L (ref 135–144)
WBC # BLD: 3.9 K/UL (ref 3.5–11.3)

## 2021-08-02 PROCEDURE — 82947 ASSAY GLUCOSE BLOOD QUANT: CPT

## 2021-08-02 PROCEDURE — 87086 URINE CULTURE/COLONY COUNT: CPT

## 2021-08-02 PROCEDURE — 93005 ELECTROCARDIOGRAM TRACING: CPT | Performed by: ANESTHESIOLOGY

## 2021-08-02 PROCEDURE — 84520 ASSAY OF UREA NITROGEN: CPT

## 2021-08-02 PROCEDURE — 80051 ELECTROLYTE PANEL: CPT

## 2021-08-02 PROCEDURE — 36415 COLL VENOUS BLD VENIPUNCTURE: CPT

## 2021-08-02 PROCEDURE — 85055 RETICULATED PLATELET ASSAY: CPT

## 2021-08-02 PROCEDURE — 82565 ASSAY OF CREATININE: CPT

## 2021-08-02 PROCEDURE — 93010 ELECTROCARDIOGRAM REPORT: CPT | Performed by: INTERNAL MEDICINE

## 2021-08-02 PROCEDURE — 85027 COMPLETE CBC AUTOMATED: CPT

## 2021-08-02 NOTE — PROGRESS NOTES
Anesthesia Focused Assessment    Hx of anesthesia complications:  no  Family hx of anesthesia complications:  no      Prior + Covid-19 test? No  Vaccinated. STOP-BANG Sleep Apnea Questionnaire    SNORE loudly (heard through closed doors)? No  TIRED, fatigued, sleepy during daytime? Yes and No  OBSERVED stopping breathing during sleep? No  High blood PRESSURE or being treated? Yes    BMI over 35? No  AGE over 48? Yes  NECK circumference over 16\"? No  GENDER (male)? Yes             Total 4  High risk 5-8  Intermediate risk 3-4  Low risk 0-2    ----------------------------------------------------------------------------------------------------------------------  ROSI:                                             no  If yes, machine?:                              Type 1 DM:                                   no  T2DM:                                           no    Coronary Artery Disease:             no  Hypertension:                               Yes, \"white coat syndrome\"  Defib / AICD / Pacemaker:           no    Renal Failure:                               no  If yes, on dialysis? :                           Active smoker:                              no  Drinks Alcohol:                              social  Illicit drugs:                                    no    Dentition:                                      Benign     Past Medical History:   Diagnosis Date    Basal cell carcinoma     Erectile dysfunction     Hearing aid worn     both ears    Hypertension     Geovanna Emmanuel, CNP    Precancerous skin lesion     Squamous cell carcinoma          Patient was evaluated in PAT & anesthesia guidelines were applied. NPO guidelines, medication instructions and scheduled arrival time were reviewed with patient.                                                                                                                       Anesthesia contacted:   no    Medical or cardiac clearance ordered: no    Last PCP visit recently- on file and PCP aware of upcoming procedure. Pt denies any history of bleeding and clotting issues.      TARA Thorpe - CNP   8/2/21  9:49 AM

## 2021-08-02 NOTE — H&P
History and Physical    Pt Name: Jessica Macias  MRN: [id number]  YOB: 1950  Date of evaluation: 8/2/2021  Primary Care Physician: TARA Flynn CNP    SUBJECTIVE:   History of Chief Complaint:    Jessica Macias is a 73 y.o. male who presents for PAT appointment. Patient complains of rising PSA levels. Had MRI prostate 6/21/21. Denies family history of prostate cancer. Denies urinary complaints. Patient has been scheduled for FUSION PROSTATE BIOPSY AND ULTRASOUND. Allergies  is allergic to influenza vaccines. Medications  Prior to Admission medications    Medication Sig Start Date End Date Taking? Authorizing Provider   lisinopril-hydroCHLOROthiazide (PRINZIDE;ZESTORETIC) 10-12.5 MG per tablet TAKE 1 TABLET DAILY 7/26/21  Yes TARA Flynn CNP   tadalafil (CIALIS) 5 MG tablet Take 1 tablet by mouth daily 5/25/21  Yes Trish Kruse MD   Flaxseed, Linseed, (FLAXSEED OIL PO) Take 1,000 mLs by mouth   Yes Historical Provider, MD   Red Yeast Rice 600 MG CAPS Take 1,200 mg by mouth 2 times daily 1/18/21  Yes TARA Flynn CNP   Cholecalciferol (VITAMIN D3) 25 MCG (1000 UT) CAPS  6/1/20  Yes Historical Provider, MD   tamsulosin (FLOMAX) 0.4 MG capsule TAKE 2 CAPSULES DAILY 10/27/20  Yes Trish Kruse MD   Bioflavonoid Products (KATHRYN-C) 500-200-60 MG TABS Take 1 tablet by mouth daily   Yes Historical Provider, MD   ciprofloxacin (CIPRO) 500 MG tablet Take 2 tablets the day before procedure, take 2 tablets the day of procedure and 2 two tablets day after procedure. 7/12/21   Carl Ang MD   Psyllium (METAMUCIL PO) Take by mouth daily     Historical Provider, MD     Past Medical History    has a past medical history of Basal cell carcinoma, Erectile dysfunction, Hearing aid worn, Hypertension, Precancerous skin lesion, and Squamous cell carcinoma. Past Surgical History   has a past surgical history that includes pre-malignant / benign skin lesion excision;  Mohs surgery; malignant skin lesion excision; and Tonsillectomy. Social History   reports that he has never smoked. He has never used smokeless tobacco.    reports current alcohol use. reports no history of drug use. Marital Status   Children 3  Occupation retired, part time [employer]   Family History  Family Status   Relation Name Status    Mother  (Not Specified)    Neg Hx  (Not Specified)     family history includes High Blood Pressure in his mother. Review of Systems:  CONSTITUTIONAL:   negative for fevers, chills, fatigue and malaise    EYES:   negative for double vision, blurred vision and photophobia    HEENT:   negative for tinnitus, epistaxis and sore throat     RESPIRATORY:   negative for cough, shortness of breath, wheezing     CARDIOVASCULAR:   negative for chest pain, palpitations, syncope, edema     GASTROINTESTINAL:   negative for nausea, vomiting     GENITOURINARY:   negative for incontinence     MUSCULOSKELETAL:   negative for neck or back pain     NEUROLOGICAL:   Negative for weakness and tingling  negative for headaches and dizziness     PSYCHIATRIC:   negative for anxiety       OBJECTIVE:   VITALS:  height is 5' 9\" (1.753 m) and weight is 185 lb (83.9 kg). His temperature is 98.1 °F (36.7 °C). His blood pressure is 173/75 (abnormal) and his pulse is 71. His respiration is 20 and oxygen saturation is 99%. (Pt reports he has \"white coat syndrome\", keeping a log of his BPs per PCP direction and they are good at home)  CONSTITUTIONAL:alert & oriented x 3, no acute distress. Friendly. Very pleasant  SKIN:  Warm and dry, no rashes on exposed areas of skin   HEAD:  Normocephalic, atraumatic   EYES: EOMs intact. PERRL. EARS:  Equal bilaterally, no edema or thickening, skin is intact without lumps or lesions. No discharge. Hearing loss, and use of aids  NOSE:  Nares patent.   No rhinorrhea   MOUTH/THROAT:  Mucous membranes moist, tongue is pink, uvula midline, teeth appear to be intact  NECK:supple, no lymphadenopathy  LUNGS: Respirations even and non-labored. Clear to auscultation bilaterally, no wheezes, rales, or rhonchi. CARDIOVASCULAR: Regular rate and rhythm, no murmurs/rubs/gallops   ABDOMEN: soft, non-tender, non-distended, bowel sounds active x 4   EXTREMITIES: No edema bilateral lower extremities. No varicosities bilateral lower extremities. NEUROLOGIC: CN II-XII are grossly intact.  Gait is smooth, rhythmic and effortless     Testing:   EK21  Labs pending: drawn 2021     IMPRESSIONS:   Elevated PSA  Abnormal MRI  PLANS:   FUSION PROSTATE BIOPSY AND ULTRASOUND    TARA ABERNATHY CNP  Electronically signed 2021 at 10:24 AM

## 2021-08-03 LAB
CULTURE: NO GROWTH
Lab: NORMAL
SPECIMEN DESCRIPTION: NORMAL

## 2021-08-11 ENCOUNTER — TELEPHONE (OUTPATIENT)
Dept: UROLOGY | Age: 71
End: 2021-08-11

## 2021-08-16 ENCOUNTER — HOSPITAL ENCOUNTER (OUTPATIENT)
Age: 71
Setting detail: OUTPATIENT SURGERY
Discharge: HOME | End: 2021-08-16
Attending: UROLOGY | Admitting: UROLOGY
Payer: MEDICARE

## 2021-08-16 ENCOUNTER — ANESTHESIA EVENT (OUTPATIENT)
Dept: OPERATING ROOM | Age: 71
End: 2021-08-16
Payer: MEDICARE

## 2021-08-16 ENCOUNTER — ANESTHESIA (OUTPATIENT)
Dept: OPERATING ROOM | Age: 71
End: 2021-08-16
Payer: MEDICARE

## 2021-08-16 ENCOUNTER — HOSPITAL ENCOUNTER (OUTPATIENT)
Dept: ULTRASOUND IMAGING | Age: 71
Discharge: HOME | End: 2021-08-18
Attending: UROLOGY
Payer: MEDICARE

## 2021-08-16 VITALS
OXYGEN SATURATION: 100 % | RESPIRATION RATE: 16 BRPM | WEIGHT: 175 LBS | HEART RATE: 45 BPM | HEIGHT: 69 IN | BODY MASS INDEX: 25.92 KG/M2 | TEMPERATURE: 96.8 F | SYSTOLIC BLOOD PRESSURE: 146 MMHG | DIASTOLIC BLOOD PRESSURE: 79 MMHG

## 2021-08-16 VITALS — DIASTOLIC BLOOD PRESSURE: 55 MMHG | SYSTOLIC BLOOD PRESSURE: 97 MMHG | OXYGEN SATURATION: 98 %

## 2021-08-16 LAB — POC POTASSIUM: 4 MMOL/L (ref 3.5–4.5)

## 2021-08-16 PROCEDURE — 2709999900 HC NON-CHARGEABLE SUPPLY: Performed by: UROLOGY

## 2021-08-16 PROCEDURE — 7100000010 HC PHASE II RECOVERY - FIRST 15 MIN: Performed by: UROLOGY

## 2021-08-16 PROCEDURE — 2709999900 US GUIDED NEEDLE PLACEMENT

## 2021-08-16 PROCEDURE — 2580000003 HC RX 258: Performed by: ANESTHESIOLOGY

## 2021-08-16 PROCEDURE — 3600000002 HC SURGERY LEVEL 2 BASE: Performed by: UROLOGY

## 2021-08-16 PROCEDURE — 88305 TISSUE EXAM BY PATHOLOGIST: CPT

## 2021-08-16 PROCEDURE — 3600000012 HC SURGERY LEVEL 2 ADDTL 15MIN: Performed by: UROLOGY

## 2021-08-16 PROCEDURE — 2500000003 HC RX 250 WO HCPCS: Performed by: UROLOGY

## 2021-08-16 PROCEDURE — 2500000003 HC RX 250 WO HCPCS: Performed by: NURSE ANESTHETIST, CERTIFIED REGISTERED

## 2021-08-16 PROCEDURE — 7100000011 HC PHASE II RECOVERY - ADDTL 15 MIN: Performed by: UROLOGY

## 2021-08-16 PROCEDURE — 84132 ASSAY OF SERUM POTASSIUM: CPT

## 2021-08-16 PROCEDURE — 3700000000 HC ANESTHESIA ATTENDED CARE: Performed by: UROLOGY

## 2021-08-16 PROCEDURE — 3700000001 HC ADD 15 MINUTES (ANESTHESIA): Performed by: UROLOGY

## 2021-08-16 PROCEDURE — 6360000002 HC RX W HCPCS: Performed by: NURSE ANESTHETIST, CERTIFIED REGISTERED

## 2021-08-16 RX ORDER — MEPERIDINE HYDROCHLORIDE 50 MG/ML
12.5 INJECTION INTRAMUSCULAR; INTRAVENOUS; SUBCUTANEOUS EVERY 5 MIN PRN
Status: DISCONTINUED | OUTPATIENT
Start: 2021-08-16 | End: 2021-08-16 | Stop reason: HOSPADM

## 2021-08-16 RX ORDER — LIDOCAINE HYDROCHLORIDE 10 MG/ML
INJECTION, SOLUTION EPIDURAL; INFILTRATION; INTRACAUDAL; PERINEURAL PRN
Status: DISCONTINUED | OUTPATIENT
Start: 2021-08-16 | End: 2021-08-16 | Stop reason: ALTCHOICE

## 2021-08-16 RX ORDER — FENTANYL CITRATE 50 UG/ML
25 INJECTION, SOLUTION INTRAMUSCULAR; INTRAVENOUS EVERY 5 MIN PRN
Status: DISCONTINUED | OUTPATIENT
Start: 2021-08-16 | End: 2021-08-16 | Stop reason: HOSPADM

## 2021-08-16 RX ORDER — PROPOFOL 10 MG/ML
INJECTION, EMULSION INTRAVENOUS CONTINUOUS PRN
Status: DISCONTINUED | OUTPATIENT
Start: 2021-08-16 | End: 2021-08-16 | Stop reason: SDUPTHER

## 2021-08-16 RX ORDER — ONDANSETRON 2 MG/ML
4 INJECTION INTRAMUSCULAR; INTRAVENOUS
Status: DISCONTINUED | OUTPATIENT
Start: 2021-08-16 | End: 2021-08-16 | Stop reason: HOSPADM

## 2021-08-16 RX ORDER — LIDOCAINE HYDROCHLORIDE 10 MG/ML
INJECTION, SOLUTION EPIDURAL; INFILTRATION; INTRACAUDAL; PERINEURAL PRN
Status: DISCONTINUED | OUTPATIENT
Start: 2021-08-16 | End: 2021-08-16 | Stop reason: SDUPTHER

## 2021-08-16 RX ORDER — FENTANYL CITRATE 50 UG/ML
50 INJECTION, SOLUTION INTRAMUSCULAR; INTRAVENOUS EVERY 5 MIN PRN
Status: DISCONTINUED | OUTPATIENT
Start: 2021-08-16 | End: 2021-08-16 | Stop reason: HOSPADM

## 2021-08-16 RX ORDER — PROPOFOL 10 MG/ML
INJECTION, EMULSION INTRAVENOUS PRN
Status: DISCONTINUED | OUTPATIENT
Start: 2021-08-16 | End: 2021-08-16 | Stop reason: SDUPTHER

## 2021-08-16 RX ORDER — SODIUM CHLORIDE, SODIUM LACTATE, POTASSIUM CHLORIDE, CALCIUM CHLORIDE 600; 310; 30; 20 MG/100ML; MG/100ML; MG/100ML; MG/100ML
1000 INJECTION, SOLUTION INTRAVENOUS CONTINUOUS
Status: DISCONTINUED | OUTPATIENT
Start: 2021-08-16 | End: 2021-08-16 | Stop reason: HOSPADM

## 2021-08-16 RX ADMIN — PROPOFOL 50 MG: 10 INJECTION, EMULSION INTRAVENOUS at 11:43

## 2021-08-16 RX ADMIN — SODIUM CHLORIDE, POTASSIUM CHLORIDE, SODIUM LACTATE AND CALCIUM CHLORIDE 1000 ML: 600; 310; 30; 20 INJECTION, SOLUTION INTRAVENOUS at 09:50

## 2021-08-16 RX ADMIN — PROPOFOL 100 MCG/KG/MIN: 10 INJECTION, EMULSION INTRAVENOUS at 11:35

## 2021-08-16 RX ADMIN — LIDOCAINE HYDROCHLORIDE 50 MG: 10 INJECTION, SOLUTION EPIDURAL; INFILTRATION; INTRACAUDAL; PERINEURAL at 11:35

## 2021-08-16 RX ADMIN — PROPOFOL 50 MG: 10 INJECTION, EMULSION INTRAVENOUS at 11:35

## 2021-08-16 RX ADMIN — PROPOFOL 30 MG: 10 INJECTION, EMULSION INTRAVENOUS at 11:38

## 2021-08-16 ASSESSMENT — PULMONARY FUNCTION TESTS
PIF_VALUE: 0
PIF_VALUE: 1
PIF_VALUE: 0
PIF_VALUE: 1
PIF_VALUE: 1
PIF_VALUE: 0
PIF_VALUE: 0
PIF_VALUE: 1
PIF_VALUE: 0
PIF_VALUE: 1

## 2021-08-16 ASSESSMENT — PAIN SCALES - GENERAL
PAINLEVEL_OUTOF10: 0
PAINLEVEL_OUTOF10: 0

## 2021-08-16 ASSESSMENT — PAIN - FUNCTIONAL ASSESSMENT: PAIN_FUNCTIONAL_ASSESSMENT: 0-10

## 2021-08-16 NOTE — H&P
History and Physical    Patient:  Raymon Vidal  MRN: [id number]  YOB: 1950    CHIEF COMPLAINT: elevated PSA    HISTORY OF PRESENT ILLNESS:   The patient is a 73 y.o. male who presents with elevated psa as below. Lab Results   Component Value Date    PSA 8.51 (H) 05/18/2021    PSA 7.76 (H) 11/17/2020    PSA 7.19 (H) 05/12/2020    He had a lesion on prostate MRI. -PI-RADS 4 lesion on the posterior lateral left peripheral zone 11 mm. Does not take blood thinners. Past Medical History:    Past Medical History:   Diagnosis Date    Basal cell carcinoma     Erectile dysfunction     Hearing aid worn     both ears    Hypertension     Nacho Vu CNP    Precancerous skin lesion     Squamous cell carcinoma        Past Surgical History:    Past Surgical History:   Procedure Laterality Date    MALIGNANT SKIN LESION EXCISION      MOHS SURGERY      PRE-MALIGNANT / BENIGN SKIN LESION EXCISION      TONSILLECTOMY         Medications Prior to Admission:    Prior to Admission medications    Medication Sig Start Date End Date Taking? Authorizing Provider   lisinopril-hydroCHLOROthiazide (PRINZIDE;ZESTORETIC) 10-12.5 MG per tablet TAKE 1 TABLET DAILY 7/26/21  Yes TARA Farzier CNP   ciprofloxacin (CIPRO) 500 MG tablet Take 2 tablets the day before procedure, take 2 tablets the day of procedure and 2 two tablets day after procedure.  7/12/21  Yes Doug Shahid MD   tadalafil (CIALIS) 5 MG tablet Take 1 tablet by mouth daily 5/25/21  Yes Doug hSahid MD   Flaxseed, Linseed, (FLAXSEED OIL PO) Take 1,000 mLs by mouth   Yes Historical Provider, MD   Red Yeast Rice 600 MG CAPS Take 1,200 mg by mouth 2 times daily 1/18/21  Yes TARA Frazier CNP   Cholecalciferol (VITAMIN D3) 25 MCG (1000 UT) CAPS  6/1/20  Yes Historical Provider, MD   tamsulosin (FLOMAX) 0.4 MG capsule TAKE 2 CAPSULES DAILY 10/27/20  Yes Doug Shahid MD   Psyllium (METAMUCIL PO) Take by mouth daily    Yes Historical Provider, MD   Bioflavonoid Products (KATHRYN-C) 500-200-60 MG TABS Take 1 tablet by mouth daily   Yes Historical Provider, MD       Allergies:  Influenza vaccines    Social History:    Social History     Socioeconomic History    Marital status:      Spouse name: Not on file    Number of children: Not on file    Years of education: Not on file    Highest education level: Not on file   Occupational History    Not on file   Tobacco Use    Smoking status: Never Smoker    Smokeless tobacco: Never Used   Substance and Sexual Activity    Alcohol use: Yes     Alcohol/week: 0.0 standard drinks     Comment: socially    Drug use: No    Sexual activity: Yes     Partners: Female   Other Topics Concern    Not on file   Social History Narrative    Not on file     Social Determinants of Health     Financial Resource Strain: Low Risk     Difficulty of Paying Living Expenses: Not hard at all   Food Insecurity: No Food Insecurity    Worried About [address] in the Last Year: Never true    [facility] in the Last Year: Never true   Transportation Needs: No Transportation Needs    Lack of Transportation (Medical): No    Lack of Transportation (Non-Medical):  No   Physical Activity:     Days of Exercise per Week:     Minutes of Exercise per Session:    Stress:     Feeling of Stress :    Social Connections:     Frequency of Communication with Friends and Family:     Frequency of Social Gatherings with Friends and Family:     Attends [employer] Services:     Active Member of Clubs or Organizations:     Attends Club or Organization Meetings:     Marital Status:    Intimate Partner Violence:     Fear of Current or Ex-Partner:     Emotionally Abused:     Physically Abused:     Sexually Abused:        Family History:    Family History   Problem Relation Age of Onset    High Blood Pressure Mother     Diabetes Neg Hx        REVIEW OF SYSTEMS:  Constitutional: negative  Eyes: negative  Respiratory: negative  Cardiovascular: negative  Gastrointestinal: negative  Genitourinary: see HPI  Musculoskeletal: negative  Skin: negative     Physical Exam:      Patient Vitals for the past 24 hrs:   BP Temp Temp src Pulse Resp SpO2 Height Weight   08/16/21 0938 (!) 169/70 97 °F (36.1 °C) Temporal 53 16 100 % 5' 9\" (1.753 m) 175 lb (79.4 kg)     Constitutional: Patient in no acute distress; Neuro: alert and oriented to person place and time. Psych: Mood and affect normal.  Lungs: Respiratory effort normal  Cardiovascular:  Normal peripheral pulses. Regular rate. Abdomen: Soft, non-tender, non-distended      LABS:   No results for input(s): WBC, HGB, HCT, MCV, PLT in the last 72 hours. No results for input(s): NA, K, CL, CO2, PHOS, BUN, CREATININE in the last 72 hours. Invalid input(s): CA  Lab Results   Component Value Date    PSA 8.51 (H) 05/18/2021    PSA 7.76 (H) 11/17/2020    PSA 7.19 (H) 05/12/2020       Additional Lab/culture results:    Urinalysis: No results for input(s): COLORU, PHUR, LABCAST, WBCUA, RBCUA, MUCUS, TRICHOMONAS, YEAST, BACTERIA, CLARITYU, SPECGRAV, LEUKOCYTESUR, UROBILINOGEN, BILIRUBINUR, BLOODU in the last 72 hours. Invalid input(s): NITRATE, GLUCOSEUKETONESUAMORPHOUS     -----------------------------------------------------------------  Imaging Results:    Assessment and Plan   Impression:    73 y.o. male with elevated PSA, PI-RAD 4 lesion    Plan:   OR today for MRI/ultrasound fusion prostate biopsy.     Eden Costello MD  10:42 AM 8/16/2021

## 2021-08-16 NOTE — PROGRESS NOTES
Patient discharged with all belongings, via wheelchair, with family to private residence. Patient had discharge paperwork and all questions asked were answered.

## 2021-08-16 NOTE — ANESTHESIA POSTPROCEDURE EVALUATION
Department of Anesthesiology  Postprocedure Note    Patient: Ce Garcia  MRN: [id number]  YOB: 1950  Date of evaluation: 8/16/2021  Time:  12:36 PM     Procedure Summary     Date: 08/16/21 Room / Location: [address]    Anesthesia Start: 0069 Anesthesia Stop: 9690    Procedure: FUSION PROSTATE BIOPSY AND ULTRASOUND (N/A Anus) Diagnosis: (ELEVATED PSA, ABNORMAL MRI)    Surgeons: Juana Felty, MD Responsible Provider: Tracey Blackburn MD    Anesthesia Type: MAC ASA Status: 2          Anesthesia Type: MAC    Sj Phase I:      Sj Phase II: Sj Score: 10    Last vitals: Reviewed and per EMR flowsheets.      POST-OP ANESTHESIA NOTE       BP (!) 146/79   Pulse (!) 45   Temp 96.8 °F (36 °C) (Temporal)   Resp 16   Ht 5' 9\" (1.753 m)   Wt 175 lb (79.4 kg)   SpO2 100%   BMI 25.84 kg/m²    Pain Assessment: 0-10  Pain Level: 0     Anesthesia Post Evaluation    Patient location during evaluation: PACU  Patient participation: complete - patient participated  Level of consciousness: awake  Pain score: 0  Airway patency: patent  Nausea & Vomiting: no vomiting and no nausea  Complications: no  Cardiovascular status: hemodynamically stable  Respiratory status: acceptable  Hydration status: stable

## 2021-08-16 NOTE — ANESTHESIA PRE PROCEDURE
Department of Anesthesiology  Preprocedure Note       Name:  Lisa Davey   Age:  73 y.o.  :  1950                                          MRN:  [id number]         Date:  2021      Surgeon: Bulmaro Hagan):  Chan Valentin MD    Procedure: Procedure(s):  FUSION PROSTATE BIOPSY AND ULTRASOUND, [facility] NOTIFIED ULTRASOUND    Medications prior to admission:   Prior to Admission medications    Medication Sig Start Date End Date Taking? Authorizing Provider   lisinopril-hydroCHLOROthiazide (PRINZIDE;ZESTORETIC) 10-12.5 MG per tablet TAKE 1 TABLET DAILY 21  Yes TARA White CNP   ciprofloxacin (CIPRO) 500 MG tablet Take 2 tablets the day before procedure, take 2 tablets the day of procedure and 2 two tablets day after procedure. 21  Yes Chan Valentin MD   tadalafil (CIALIS) 5 MG tablet Take 1 tablet by mouth daily 21  Yes Chan Valentin MD   Flaxseed, Linseed, (FLAXSEED OIL PO) Take 1,000 mLs by mouth   Yes Historical Provider, MD   Red Yeast Rice 600 MG CAPS Take 1,200 mg by mouth 2 times daily 21  Yes TARA White CNP   Cholecalciferol (VITAMIN D3) 25 MCG (1000 UT) CAPS  20  Yes Historical Provider, MD   tamsulosin (FLOMAX) 0.4 MG capsule TAKE 2 CAPSULES DAILY 10/27/20  Yes Chan Valentin MD   Psyllium (METAMUCIL PO) Take by mouth daily    Yes Historical Provider, MD   Bioflavonoid Products (KATHRYN-C) 500-200-60 MG TABS Take 1 tablet by mouth daily   Yes Historical Provider, MD       Current medications:    Current Facility-Administered Medications   Medication Dose Route Frequency Provider Last Rate Last Admin    lactated ringers infusion 1,000 mL  1,000 mL Intravenous Continuous Alice Galeano MD           Allergies:     Allergies   Allergen Reactions    Influenza Vaccines Swelling       Problem List:    Patient Active Problem List   Diagnosis Code    Essential hypertension I10    Cervical spondylolysis M43.02    Neck pain M54.2    Right arm pain M79.601    Coagulation defect ([facility] [address] D68.9       Past Medical History:        Diagnosis Date    Basal cell carcinoma     Erectile dysfunction     Hearing aid worn     both ears    Hypertension     Sabrina Lapidus, CNP    Precancerous skin lesion     Squamous cell carcinoma        Past Surgical History:        Procedure Laterality Date    MALIGNANT SKIN LESION EXCISION      MOHS SURGERY      PRE-MALIGNANT / BENIGN SKIN LESION EXCISION      TONSILLECTOMY         Social History:    Social History     Tobacco Use    Smoking status: Never Smoker    Smokeless tobacco: Never Used   Substance Use Topics    Alcohol use: Yes     Alcohol/week: 0.0 standard drinks     Comment: socially                                Counseling given: Not Answered      Vital Signs (Current):   Vitals:    08/16/21 0938   BP: (!) 169/70   Pulse: 53   Resp: 16   Temp: 97 °F (36.1 °C)   TempSrc: Temporal   SpO2: 100%   Weight: 175 lb (79.4 kg)   Height: 5' 9\" (1.753 m)                                              BP Readings from Last 3 Encounters:   08/16/21 (!) 169/70   08/02/21 (!) 173/75   07/26/21 (!) 158/76       NPO Status: Time of last liquid consumption: 1800                        Time of last solid consumption: 1800                        Date of last liquid consumption: 08/15/21                        Date of last solid food consumption: 08/15/21    BMI:   Wt Readings from Last 3 Encounters:   08/16/21 175 lb (79.4 kg)   08/02/21 185 lb (83.9 kg)   07/26/21 182 lb 3.2 oz (82.6 kg)     Body mass index is 25.84 kg/m².     CBC:   Lab Results   Component Value Date    WBC 3.9 08/02/2021    RBC 5.27 08/02/2021    HGB 14.8 08/02/2021    HCT 45.4 08/02/2021    MCV 86.1 08/02/2021    RDW 14.3 08/02/2021    PLT See Reflexed IPF Result 08/02/2021       CMP:   Lab Results   Component Value Date     08/02/2021    K 4.6 08/02/2021     08/02/2021    CO2 26 08/02/2021    BUN 13 08/02/2021    CREATININE 0.80 08/02/2021    GFRAA >60 08/02/2021 LABGLOM >60 08/02/2021    GLUCOSE 150 08/02/2021    PROT 7.2 01/08/2021    CALCIUM 9.4 01/08/2021    BILITOT 0.59 01/08/2021    ALKPHOS 48 01/08/2021    AST 26 01/08/2021    ALT 22 01/08/2021       POC Tests: No results for input(s): POCGLU, POCNA, POCK, POCCL, POCBUN, POCHEMO, POCHCT in the last 72 hours. Coags: No results found for: PROTIME, INR, APTT    HCG (If Applicable): No results found for: PREGTESTUR, PREGSERUM, HCG, HCGQUANT     ABGs: No results found for: PHART, PO2ART, BCJ5RZY, YYR1WCI, BEART, I1XPJTUX     Type & Screen (If Applicable):  No results found for: LABABO, LABRH    Drug/Infectious Status (If Applicable):  Lab Results   Component Value Date    HEPCAB NONREACTIVE 12/17/2019       COVID-19 Screening (If Applicable): No results found for: COVID19        Anesthesia Evaluation    Airway: Mallampati: II  TM distance: >3 FB   Neck ROM: full  Mouth opening: > = 3 FB Dental: normal exam   (+) other      Pulmonary:Negative Pulmonary ROS and normal exam                               Cardiovascular:    (+) hypertension:,                   Neuro/Psych:   Negative Neuro/Psych ROS              GI/Hepatic/Renal: Neg GI/Hepatic/Renal ROS            Endo/Other: Negative Endo/Other ROS                    Abdominal:             Vascular: negative vascular ROS. Other Findings:           Anesthesia Plan      MAC     ASA 2       Induction: intravenous. MIPS: Postoperative opioids intended. Anesthetic plan and risks discussed with patient and spouse. Plan discussed with CRNA.                 Tracey Blackburn MD   8/16/2021

## 2021-08-17 LAB
SURGICAL PATHOLOGY REPORT: NORMAL
SURGICAL PATHOLOGY REPORT: NORMAL

## 2021-08-24 ENCOUNTER — OFFICE VISIT (OUTPATIENT)
Dept: UROLOGY | Age: 71
End: 2021-08-24
Payer: MEDICARE

## 2021-08-24 VITALS
WEIGHT: 175 LBS | BODY MASS INDEX: 25.92 KG/M2 | SYSTOLIC BLOOD PRESSURE: 152 MMHG | DIASTOLIC BLOOD PRESSURE: 79 MMHG | TEMPERATURE: 96.3 F | HEART RATE: 69 BPM | HEIGHT: 69 IN

## 2021-08-24 DIAGNOSIS — R97.20 ELEVATED PSA: Primary | ICD-10-CM

## 2021-08-24 DIAGNOSIS — N13.8 BPH WITH OBSTRUCTION/LOWER URINARY TRACT SYMPTOMS: ICD-10-CM

## 2021-08-24 DIAGNOSIS — N40.1 BPH WITH OBSTRUCTION/LOWER URINARY TRACT SYMPTOMS: ICD-10-CM

## 2021-08-24 PROCEDURE — 99213 OFFICE O/P EST LOW 20 MIN: CPT | Performed by: UROLOGY

## 2021-08-24 ASSESSMENT — ENCOUNTER SYMPTOMS
WHEEZING: 0
EYE REDNESS: 0
EYE PAIN: 0
COUGH: 0
BACK PAIN: 0
SHORTNESS OF BREATH: 0
NAUSEA: 0
VOMITING: 0
DIARRHEA: 0
CONSTIPATION: 0
ABDOMINAL PAIN: 0

## 2021-08-24 NOTE — PROGRESS NOTES
[address] [address]  Dept: [facility] Urology Office Note - Established    Patient:  Lian Camacho  YOB: 1950  Date: 8/24/2021    The patient is a 73 y.o. male who presents todayfor evaluation of the following problems:   Chief Complaint   Patient presents with    Results     Bx results     Other     still having blood clots, after procedure had noticed that he had a weak stream       HPI  He is here in follow up after biopsy. He had a PI RADS 4 lesion. His path was negative. PSA was 8.5. Summary of old records: N/A    Additional History: N/A    Procedures Today: N/A    Urinalysis today:  No results found for this visit on 08/24/21. Last several PSA's:  Lab Results   Component Value Date    PSA 8.51 (H) 05/18/2021    PSA 7.76 (H) 11/17/2020    PSA 7.19 (H) 05/12/2020     Last total testosterone:  Lab Results   Component Value Date    TESTOSTERONE 437 09/18/2013       AUA Symptom Score (8/24/2021):   INCOMPLETE EMPTYING: How often have you had the sensation of not emptying your bladder?: Less than Half the time  FREQUENCY: How often do you have to urinate less than every two hours?: Less than Half the time  INTERMITTENCY: How often have you found you stopped and started again several times when you urinated?: Less than Half the time  URGENCY: How often have you found it difficult to postpone urination?: Not at all  WEAK STREAM: How often have you had a weak urinary stream?: Less than Half the time  STRAINING: How often have you had to strain to start  urination?: Not at all  NOCTURIA: How many times did you typically get up at night to uriniate?: 2 Times  TOTAL I-PSS SCORE[de-identified] 10  How would you feel if you were to spend the rest of your life with your urinary condition?: Mostly Satisfied    Last BUN and creatinine:  Lab Results   Component Value Date    BUN 13 08/02/2021     Lab Results   Component Value Date    CREATININE 0.80 08/02/2021       Additional Lab/Culture results: none    Imaging Reviewed during this Office Visit: none  (results were independently reviewed by physician and radiology report verified)    PAST MEDICAL, FAMILY AND SOCIAL HISTORY UPDATE:  Past Medical History:   Diagnosis Date    Basal cell carcinoma     Erectile dysfunction     Hearing aid worn     both ears    Hypertension     Loela Coad, CNP    Precancerous skin lesion     Squamous cell carcinoma      Past Surgical History:   Procedure Laterality Date    MALIGNANT SKIN LESION EXCISION      MOHS SURGERY      PRE-MALIGNANT / BENIGN SKIN LESION EXCISION      PROSTATE BIOPSY N/A 8/16/2021    FUSION PROSTATE BIOPSY AND ULTRASOUND performed by Jessica Dubose MD at [facility]       Family History   Problem Relation Age of Onset    High Blood Pressure Mother     Diabetes Neg Hx      Outpatient Medications Marked as Taking for the 8/24/21 encounter (Office Visit) with Jessica Dubose MD   Medication Sig Dispense Refill    lisinopril-hydroCHLOROthiazide (PRINZIDE;ZESTORETIC) 10-12.5 MG per tablet TAKE 1 TABLET DAILY 90 tablet 1    ciprofloxacin (CIPRO) 500 MG tablet Take 2 tablets the day before procedure, take 2 tablets the day of procedure and 2 two tablets day after procedure.  6 tablet 0    tadalafil (CIALIS) 5 MG tablet Take 1 tablet by mouth daily 90 tablet 3    Flaxseed, Linseed, (FLAXSEED OIL PO) Take 1,000 mLs by mouth      Red Yeast Rice 600 MG CAPS Take 1,200 mg by mouth 2 times daily 360 capsule 0    Cholecalciferol (VITAMIN D3) 25 MCG (1000 UT) CAPS       tamsulosin (FLOMAX) 0.4 MG capsule TAKE 2 CAPSULES DAILY 180 capsule 3    Psyllium (METAMUCIL PO) Take by mouth daily       Bioflavonoid Products (KATHRYN-C) 500-200-60 MG TABS Take 1 tablet by mouth daily         Influenza vaccines  Social History     Tobacco Use   Smoking Status Never Smoker   Smokeless Tobacco Never Used (Ifpatient a smoker, smoking cessation counseling offered)    Social History     Substance and Sexual Activity   Alcohol Use Yes    Alcohol/week: 0.0 standard drinks    Comment: socially       REVIEW OF SYSTEMS:  Review of Systems    Physical Exam:      Vitals:    08/24/21 1512   BP: (!) 152/79   Pulse: 69   Temp: 96.3 °F (35.7 °C)     Body mass index is 25.84 kg/m². Patient is a 73 y.o. male in no acute distress and alert and oriented to person, place and time. Physical Exam  Constitutional: Patient in no acute distress. Neuro: Alert and oriented to person, place and time. Psych: Mood normal, affect normal  Lungs: Respiratory effort is normal  Cardiovascular: Warm & Pink  Abdomen: Soft, non-tender, non-distended with no CVA,  No flank tenderness,  Or hepatosplenomegaly   Lymphatics: No palpablelymphadenopathy. Bladder non-tender and not distended. Musculoskeletal: Normal gait and station      Assessment and Plan      1. Elevated PSA    2. BPH with obstruction/lower urinary tract symptoms           Plan:          Fusion biopsy was negative. Continue Flomax BID. MRI was PI RADS 4. Will send path for Confirm MDx. Return in about 6 weeks (around 10/5/2021). Prescriptions Ordered:  No orders of the defined types were placed in this encounter. Orders Placed:  No orders of the defined types were placed in this encounter. Marylen Platts, MD    Agree with the ROS entered by the MA.

## 2021-08-24 NOTE — PROGRESS NOTES
Review of Systems   Constitutional: Negative for chills, fatigue and fever. Eyes: Negative for pain, redness and visual disturbance. Respiratory: Negative for cough, shortness of breath and wheezing. Cardiovascular: Negative for chest pain and leg swelling. Gastrointestinal: Negative for abdominal pain, constipation, diarrhea, nausea and vomiting. Genitourinary: Positive for difficulty urinating (\"this morning was stopping and going, this afternoon was better. \" \" Went from getting up once a night to twice a night. \" ) and frequency. Negative for dysuria, flank pain, hematuria, scrotal swelling, testicular pain and urgency. Musculoskeletal: Negative for back pain, joint swelling and myalgias. Skin: Negative for rash and wound. Neurological: Negative for dizziness, tremors and numbness. Hematological: Does not bruise/bleed easily.

## 2021-08-24 NOTE — LETTER
[address] [address]  Dept: [phone]  Dept Fax: [phone]        8/24/21    Patient: Jessica Macias  YOB: 1950    Dear TARA Flynn CNP,    I had the pleasure of seeing one of your patients, Amelia Watts today in the office today. Below are the relevant portions of my assessment and plan of care. IMPRESSION:  1. Elevated PSA    2. BPH with obstruction/lower urinary tract symptoms        PLAN:  Fusion biopsy was negative. Continue Flomax BID. MRI was PI RADS 4. Will send path for Confirm MDx. Return in about 6 weeks (around 10/5/2021). Prescriptions Ordered:  No orders of the defined types were placed in this encounter. Orders Placed:  No orders of the defined types were placed in this encounter. Thank you for allowing me to participate in the care of this patient. I will keep you updated on this patient's follow up and I look forward to serving you and your patients again in the future.         Trish Kruse MD

## 2021-10-06 ENCOUNTER — HOSPITAL ENCOUNTER (OUTPATIENT)
Age: 71
Setting detail: SPECIMEN
Discharge: HOME | End: 2021-10-06
Payer: MEDICARE

## 2021-10-26 ENCOUNTER — TELEPHONE (OUTPATIENT)
Dept: UROLOGY | Age: 71
End: 2021-10-26

## 2021-10-26 NOTE — TELEPHONE ENCOUNTER
Pt was asking if MDx results were back. Writer informed pt there were no results in chart yet and it does take sometime to get results. Pt verbalized understanding and would like to know when results are in.

## 2021-10-27 NOTE — TELEPHONE ENCOUNTER
Writer did contact MD herzog for results. Writer spoke with Micki Blair and currently waiting for results.

## 2021-10-28 LAB — SURGICAL PATHOLOGY REPORT: NORMAL

## 2021-11-10 DIAGNOSIS — N40.1 HYPERTROPHY OF PROSTATE WITH URINARY OBSTRUCTION: ICD-10-CM

## 2021-11-10 DIAGNOSIS — N13.8 HYPERTROPHY OF PROSTATE WITH URINARY OBSTRUCTION: ICD-10-CM

## 2021-11-16 ENCOUNTER — TELEPHONE (OUTPATIENT)
Dept: UROLOGY | Age: 71
End: 2021-11-16

## 2021-11-16 NOTE — TELEPHONE ENCOUNTER
Patient called, He said that Dr. Ravi Sims was suppose to call him to go over the MDX results. Please advise.

## 2021-11-17 ENCOUNTER — TELEPHONE (OUTPATIENT)
Dept: UROLOGY | Age: 71
End: 2021-11-17

## 2021-11-17 RX ORDER — TAMSULOSIN HYDROCHLORIDE 0.4 MG/1
CAPSULE ORAL
Qty: 180 CAPSULE | Refills: 3 | Status: ON HOLD | OUTPATIENT
Start: 2021-11-17 | End: 2022-05-15 | Stop reason: HOSPADM

## 2021-11-17 NOTE — TELEPHONE ENCOUNTER
Patient states he has made multiple phone call and no return call has been made in regards to his medication refill. Patient states he call [facility] and they have no record of request. Patient would like a call back today.

## 2021-11-17 NOTE — TELEPHONE ENCOUNTER
Writer did send this medication to for patient. Writer did speak with patient and let him know and asked if he needed a short term supply sent to local pharmacy. Patient stated no as it would be 3x the amount and he would wait. Patient does have apt 11/19/21 to go over his results.

## 2021-12-07 ENCOUNTER — OFFICE VISIT (OUTPATIENT)
Dept: UROLOGY | Age: 71
End: 2021-12-07
Payer: MEDICARE

## 2021-12-07 VITALS
HEIGHT: 69 IN | SYSTOLIC BLOOD PRESSURE: 135 MMHG | TEMPERATURE: 96.2 F | DIASTOLIC BLOOD PRESSURE: 81 MMHG | BODY MASS INDEX: 25.92 KG/M2 | WEIGHT: 175 LBS | HEART RATE: 69 BPM

## 2021-12-07 DIAGNOSIS — R97.20 ELEVATED PSA: Primary | ICD-10-CM

## 2021-12-07 DIAGNOSIS — N52.9 ERECTILE DYSFUNCTION, UNSPECIFIED ERECTILE DYSFUNCTION TYPE: ICD-10-CM

## 2021-12-07 DIAGNOSIS — R93.89 ABNORMAL MRI: ICD-10-CM

## 2021-12-07 PROCEDURE — 99214 OFFICE O/P EST MOD 30 MIN: CPT | Performed by: UROLOGY

## 2021-12-07 ASSESSMENT — ENCOUNTER SYMPTOMS
ABDOMINAL PAIN: 0
EYE REDNESS: 0
COUGH: 0
CONSTIPATION: 0
EYES NEGATIVE: 1
BACK PAIN: 0
DIARRHEA: 0
SHORTNESS OF BREATH: 0
WHEEZING: 0
EYE PAIN: 0
GASTROINTESTINAL NEGATIVE: 1
RESPIRATORY NEGATIVE: 1
NAUSEA: 0
VOMITING: 0

## 2021-12-07 NOTE — PROGRESS NOTES
[address] [address]  Dept: [address] [facility] Urology Office Note - Established    Patient:  Nirmala Andrews  YOB: 1950  Date: 12/7/2021    The patient is a 73 y.o. male who presents todayfor evaluation of the following problems:   Chief Complaint   Patient presents with    Results     path results with med refill       HPI  He is here in follow up for elevated PSA  He has had an MRI, which was PI RADS   Targeted biopsy was negative. His PSA was checked in May and was elevated at 8.51. He continues on Cilias and Flomax. Summary of old records: N/A    Additional History: N/A    Procedures Today: N/A    Urinalysis today:  No results found for this visit on 12/07/21.   Last several PSA's:  Lab Results   Component Value Date    PSA 8.51 (H) 05/18/2021    PSA 7.76 (H) 11/17/2020    PSA 7.19 (H) 05/12/2020     Last total testosterone:  Lab Results   Component Value Date    TESTOSTERONE 437 09/18/2013       AUA Symptom Score (12/7/2021):                               Last BUN and creatinine:  Lab Results   Component Value Date    BUN 13 08/02/2021     Lab Results   Component Value Date    CREATININE 0.80 08/02/2021       Additional Lab/Culture results: none    Imaging Reviewed during this Office Visit: none  (results were independently reviewed by physician and radiology report verified)    PAST MEDICAL, FAMILY AND SOCIAL HISTORY UPDATE:  Past Medical History:   Diagnosis Date    Basal cell carcinoma     Erectile dysfunction     Hearing aid worn     both ears    Hypertension     Tad Bae, CNP    Precancerous skin lesion     Squamous cell carcinoma      Past Surgical History:   Procedure Laterality Date    MALIGNANT SKIN LESION EXCISION      MOHS SURGERY      PRE-MALIGNANT / BENIGN SKIN LESION EXCISION      PROSTATE BIOPSY N/A 8/16/2021    FUSION PROSTATE BIOPSY AND ULTRASOUND performed by Jay Clancy MD at [facility]       Family History   Problem Relation Age of Onset    High Blood Pressure Mother     Diabetes Neg Hx      Outpatient Medications Marked as Taking for the 12/7/21 encounter (Office Visit) with Jay Clancy MD   Medication Sig Dispense Refill    tamsulosin (FLOMAX) 0.4 MG capsule TAKE 2 CAPSULES DAILY 180 capsule 3    lisinopril-hydroCHLOROthiazide (PRINZIDE;ZESTORETIC) 10-12.5 MG per tablet TAKE 1 TABLET DAILY 90 tablet 1    tadalafil (CIALIS) 5 MG tablet Take 1 tablet by mouth daily 90 tablet 3    Flaxseed, Linseed, (FLAXSEED OIL PO) Take 1,000 mLs by mouth      Red Yeast Rice 600 MG CAPS Take 1,200 mg by mouth 2 times daily 360 capsule 0    Cholecalciferol (VITAMIN D3) 25 MCG (1000 UT) CAPS       Psyllium (METAMUCIL PO) Take by mouth daily       Bioflavonoid Products (KATHRYN-C) 500-200-60 MG TABS Take 1 tablet by mouth daily         Influenza vaccines  Social History     Tobacco Use   Smoking Status Never Smoker   Smokeless Tobacco Never Used     (Ifpatient a smoker, smoking cessation counseling offered)    Social History     Substance and Sexual Activity   Alcohol Use Yes    Alcohol/week: 0.0 standard drinks    Comment: socially       REVIEW OF SYSTEMS:  Review of Systems    Physical Exam:      Vitals:    12/07/21 1127   BP: 135/81   Pulse: 69   Temp: 96.2 °F (35.7 °C)     Body mass index is 25.84 kg/m². Patient is a 73 y.o. male in no acute distress and alert and oriented to person, place and time. Physical Exam  Constitutional: Patient in no acute distress. Neuro: Alert and oriented to person, place and time. Psych: Mood normal, affect normal  Lungs: Respiratory effort is normal  Cardiovascular: Warm & Pink  Abdomen: Soft, non-tender, non-distended with no CVA,  No flank tenderness,  Or hepatosplenomegaly   Lymphatics: No palpablelymphadenopathy. Bladder non-tender and not distended.   Musculoskeletal: Normal gait and station      Assessment and Plan      1. Elevated PSA    2. Abnormal MRI    3. Erectile dysfunction, unspecified erectile dysfunction type           Plan:          He is doing better with Flomax and Cialis. Will continue both. Voiding is much improved. Targeted biopsy was negative. Confirm MDx showed 19% risk of [place] 7 or greater at 19%. Will repeat PSA. Return in about 3 months (around 3/7/2022) for Labs. Prescriptions Ordered:  No orders of the defined types were placed in this encounter. Orders Placed:  Orders Placed This Encounter   Procedures    PSA, Diagnostic     Standing Status:   Future     Standing Expiration Date:   12/7/2022           Manasa John MD    Agree with the ROS entered by the MA.

## 2021-12-07 NOTE — LETTER
[address] [address]  Dept: [phone]  Dept Fax: [phone]        12/7/21    Patient: Shalom Berkowitz  YOB: 1950    Dear Jacinta Mcdonnell, TARA - CNP,    I had the pleasure of seeing one of your patients, Sabrina Hernandez today in the office today. Below are the relevant portions of my assessment and plan of care. IMPRESSION:  1. Elevated PSA    2. Abnormal MRI    3. Erectile dysfunction, unspecified erectile dysfunction type        PLAN:  He is doing better with Flomax and Cialis. Will continue both. Voiding is much improved. Targeted biopsy was negative. Confirm MDx showed 19% risk of [place] 7 or greater at 19%. Will repeat PSA. Return in about 3 months (around 3/7/2022) for Labs. Prescriptions Ordered:  No orders of the defined types were placed in this encounter. Orders Placed:  Orders Placed This Encounter   Procedures    PSA, Diagnostic     Standing Status:   Future     Standing Expiration Date:   12/7/2022        Thank you for allowing me to participate in the care of this patient. I will keep you updated on this patient's follow up and I look forward to serving you and your patients again in the future.         Anil Salas MD

## 2022-01-26 PROBLEM — M79.601 RIGHT ARM PAIN: Status: RESOLVED | Noted: 2019-01-08 | Resolved: 2022-01-26

## 2022-02-09 ENCOUNTER — HOSPITAL ENCOUNTER (OUTPATIENT)
Age: 72
Setting detail: SPECIMEN
Discharge: HOME | End: 2022-02-09

## 2022-02-09 DIAGNOSIS — E55.9 VITAMIN D INSUFFICIENCY: ICD-10-CM

## 2022-02-09 DIAGNOSIS — E78.49 OTHER HYPERLIPIDEMIA: ICD-10-CM

## 2022-02-09 DIAGNOSIS — I10 ESSENTIAL HYPERTENSION: ICD-10-CM

## 2022-02-09 DIAGNOSIS — D68.9 COAGULATION DEFECT (HCC): ICD-10-CM

## 2022-02-09 DIAGNOSIS — R73.9 ELEVATED BLOOD SUGAR: ICD-10-CM

## 2022-02-09 LAB
ALBUMIN SERPL-MCNC: 4.6 G/DL (ref 3.5–5.2)
ALBUMIN/GLOBULIN RATIO: 2.2 (ref 1–2.5)
ALP BLD-CCNC: 50 U/L (ref 40–129)
ALT SERPL-CCNC: 21 U/L (ref 5–41)
ANION GAP SERPL CALCULATED.3IONS-SCNC: 18 MMOL/L (ref 9–17)
AST SERPL-CCNC: 19 U/L
BILIRUB SERPL-MCNC: 0.27 MG/DL (ref 0.3–1.2)
BUN BLDV-MCNC: 17 MG/DL (ref 8–23)
BUN/CREAT BLD: ABNORMAL (ref 9–20)
CALCIUM SERPL-MCNC: 9.7 MG/DL (ref 8.6–10.4)
CHLORIDE BLD-SCNC: 108 MMOL/L (ref 98–107)
CHOLESTEROL, FASTING: 241 MG/DL
CHOLESTEROL/HDL RATIO: 4.7
CO2: 24 MMOL/L (ref 20–31)
CREAT SERPL-MCNC: 0.85 MG/DL (ref 0.7–1.2)
GFR AFRICAN AMERICAN: >60 ML/MIN
GFR NON-AFRICAN AMERICAN: >60 ML/MIN
GFR SERPL CREATININE-BSD FRML MDRD: ABNORMAL ML/MIN/{1.73_M2}
GFR SERPL CREATININE-BSD FRML MDRD: ABNORMAL ML/MIN/{1.73_M2}
GLUCOSE BLD-MCNC: 119 MG/DL (ref 70–99)
HCT VFR BLD CALC: 46.4 % (ref 40.7–50.3)
HDLC SERPL-MCNC: 51 MG/DL
HEMOGLOBIN: 15 G/DL (ref 13–17)
LDL CHOLESTEROL: 168 MG/DL (ref 0–130)
MCH RBC QN AUTO: 28.7 PG (ref 25.2–33.5)
MCHC RBC AUTO-ENTMCNC: 32.3 G/DL (ref 28.4–34.8)
MCV RBC AUTO: 88.9 FL (ref 82.6–102.9)
NRBC AUTOMATED: 0 PER 100 WBC
PDW BLD-RTO: 14.7 % (ref 11.8–14.4)
PLATELET # BLD: 134 K/UL (ref 138–453)
PMV BLD AUTO: 11.7 FL (ref 8.1–13.5)
POTASSIUM SERPL-SCNC: 4.4 MMOL/L (ref 3.7–5.3)
RBC # BLD: 5.22 M/UL (ref 4.21–5.77)
SODIUM BLD-SCNC: 150 MMOL/L (ref 135–144)
TOTAL PROTEIN: 6.7 G/DL (ref 6.4–8.3)
TRIGLYCERIDE, FASTING: 110 MG/DL
VITAMIN D 25-HYDROXY: 53.5 NG/ML (ref 30–100)
VLDLC SERPL CALC-MCNC: ABNORMAL MG/DL (ref 1–30)
WBC # BLD: 5.1 K/UL (ref 3.5–11.3)

## 2022-02-10 LAB
ESTIMATED AVERAGE GLUCOSE: 114 MG/DL
HBA1C MFR BLD: 5.6 % (ref 4–6)

## 2022-02-28 ENCOUNTER — HOSPITAL ENCOUNTER (OUTPATIENT)
Age: 72
Setting detail: SPECIMEN
Discharge: HOME | End: 2022-02-28

## 2022-02-28 DIAGNOSIS — R97.20 ELEVATED PSA: ICD-10-CM

## 2022-02-28 LAB — PROSTATE SPECIFIC ANTIGEN: 8.5 UG/L

## 2022-03-08 ENCOUNTER — TELEPHONE (OUTPATIENT)
Dept: UROLOGY | Age: 72
End: 2022-03-08

## 2022-03-08 ENCOUNTER — OFFICE VISIT (OUTPATIENT)
Dept: UROLOGY | Age: 72
End: 2022-03-08
Payer: MEDICARE

## 2022-03-08 VITALS
HEART RATE: 69 BPM | SYSTOLIC BLOOD PRESSURE: 128 MMHG | TEMPERATURE: 96.4 F | HEIGHT: 69 IN | DIASTOLIC BLOOD PRESSURE: 85 MMHG | BODY MASS INDEX: 28.29 KG/M2 | WEIGHT: 191 LBS

## 2022-03-08 DIAGNOSIS — N40.1 BPH WITH OBSTRUCTION/LOWER URINARY TRACT SYMPTOMS: ICD-10-CM

## 2022-03-08 DIAGNOSIS — R97.20 ELEVATED PSA: Primary | ICD-10-CM

## 2022-03-08 DIAGNOSIS — N13.8 BPH WITH OBSTRUCTION/LOWER URINARY TRACT SYMPTOMS: ICD-10-CM

## 2022-03-08 DIAGNOSIS — N52.9 ERECTILE DYSFUNCTION, UNSPECIFIED ERECTILE DYSFUNCTION TYPE: ICD-10-CM

## 2022-03-08 PROCEDURE — 99214 OFFICE O/P EST MOD 30 MIN: CPT | Performed by: UROLOGY

## 2022-03-08 RX ORDER — TADALAFIL 5 MG/1
5 TABLET ORAL DAILY
Qty: 90 TABLET | Refills: 3 | Status: SHIPPED | OUTPATIENT
Start: 2022-03-08

## 2022-03-08 ASSESSMENT — ENCOUNTER SYMPTOMS
NAUSEA: 0
RESPIRATORY NEGATIVE: 1
SHORTNESS OF BREATH: 0
CONSTIPATION: 0
DIARRHEA: 0
EYE PAIN: 0
COUGH: 0
BACK PAIN: 0
ABDOMINAL PAIN: 0
EYES NEGATIVE: 1
EYE REDNESS: 0
VOMITING: 0
GASTROINTESTINAL NEGATIVE: 1
WHEEZING: 0

## 2022-03-08 NOTE — LETTER
[address] [address]  Dept: [phone]  Dept Fax: [phone]        3/8/22    Patient: Molly Devine  YOB: 1950    Dear TARA Anne CNP,    I had the pleasure of seeing one of your patients, Myriam Johnson today in the office today. Below are the relevant portions of my assessment and plan of care. IMPRESSION:  1. Elevated PSA    2. Erectile dysfunction, unspecified erectile dysfunction type    3. BPH with obstruction/lower urinary tract symptoms        PLAN:  He is doing well  Continue Flomax. Continue Cialis. PSA is stable. Targeted biopsy was negative. F/U in 6 months with a PSA/COCO. Return in about 6 months (around 9/8/2022). Prescriptions Ordered:  Orders Placed This Encounter   Medications    tadalafil (CIALIS) 5 MG tablet     Sig: Take 1 tablet by mouth daily     Dispense:  90 tablet     Refill:  3     Orders Placed:  No orders of the defined types were placed in this encounter. Thank you for allowing me to participate in the care of this patient. I will keep you updated on this patient's follow up and I look forward to serving you and your patients again in the future.         Malaika Caldwell MD

## 2022-03-08 NOTE — PROGRESS NOTES
[address] [facility] [address]  Dept:  [facility] Urology Office Note - Established    Patient:  Sal Calero  YOB: 1950  Date: 3/8/2022    The patient is a 73 y.o. male who presents todayfor evaluation of the following problems:   Chief Complaint   Patient presents with    Follow-up     3 month f/u with PSA       HPI  He is here in follow up for elevated PSA. He had an MRI, which was PI RADS 4. Targeted biopsy was negative. He is voiding fine. PSA is stable. He uses Cialis for ED, which is not working quite as well. Summary of old records: N/A    Additional History: N/A    Procedures Today: N/A    Urinalysis today:  No results found for this visit on 03/08/22. Last several PSA's:  Lab Results   Component Value Date    PSA 8.50 (H) 02/28/2022    PSA 8.51 (H) 05/18/2021    PSA 7.76 (H) 11/17/2020     Last total testosterone:  Lab Results   Component Value Date    TESTOSTERONE 437 09/18/2013       AUA Symptom Score (3/8/2022):   INCOMPLETE EMPTYING: How often have you had the sensation of not emptying your bladder?: Not at all  FREQUENCY: How often do you have to urinate less than every two hours?: Not at all  INTERMITTENCY: How often have you found you stopped and started again several times when you urinated?: Not at all  URGENCY: How often have you found it difficult to postpone urination?: Not at all  WEAK STREAM: How often have you had a weak urinary stream?: Less than Half the time  STRAINING: How often have you had to strain to start  urination?: Not at all  NOCTURIA: How many times did you typically get up at night to uriniate?: 1 Time  TOTAL I-PSS SCORE[de-identified] 3       Last BUN and creatinine:  Lab Results   Component Value Date    BUN 17 02/09/2022     Lab Results   Component Value Date    CREATININE 0.85 02/09/2022       Additional Lab/Culture results: none    Imaging Reviewed during this Office Visit: none  (results were independently reviewed by physician and radiology report verified)    PAST MEDICAL, FAMILY AND SOCIAL HISTORY UPDATE:  Past Medical History:   Diagnosis Date    Basal cell carcinoma     Erectile dysfunction     Hearing aid worn     both ears    Hypertension     Akhil Looney CNP    Precancerous skin lesion     Squamous cell carcinoma      Past Surgical History:   Procedure Laterality Date    MALIGNANT SKIN LESION EXCISION      MOHS SURGERY      PRE-MALIGNANT / BENIGN SKIN LESION EXCISION      PROSTATE BIOPSY N/A 8/16/2021    FUSION PROSTATE BIOPSY AND ULTRASOUND performed by Kelvin Baird MD at [facility]       Family History   Problem Relation Age of Onset    High Blood Pressure Mother     Diabetes Neg Hx      Outpatient Medications Marked as Taking for the 3/8/22 encounter (Office Visit) with Kelvin Baird MD   Medication Sig Dispense Refill    tadalafil (CIALIS) 5 MG tablet Take 1 tablet by mouth daily 90 tablet 3    lisinopril-hydroCHLOROthiazide (PRINZIDE;ZESTORETIC) 20-12.5 MG per tablet Take 1 tablet by mouth daily 90 tablet 1    erythromycin (ROMYCIN) 5 MG/GM ophthalmic ointment Place into both eyes every 6 hours while awake 3.5 g 0    tamsulosin (FLOMAX) 0.4 MG capsule TAKE 2 CAPSULES DAILY 180 capsule 3    Flaxseed, Linseed, (FLAXSEED OIL PO) Take 1,000 mLs by mouth      Red Yeast Rice 600 MG CAPS Take 1,200 mg by mouth 2 times daily 360 capsule 0    Cholecalciferol (VITAMIN D3) 25 MCG (1000 UT) CAPS       Psyllium (METAMUCIL PO) Take by mouth daily       Bioflavonoid Products (KATHRYN-C) 500-200-60 MG TABS Take 1 tablet by mouth daily         Influenza vaccines  Social History     Tobacco Use   Smoking Status Never Smoker   Smokeless Tobacco Never Used     (Ifpatient a smoker, smoking cessation counseling offered)    Social History     Substance and Sexual Activity   Alcohol Use Yes    Alcohol/week: 0.0 standard drinks    Comment: socially       REVIEW OF SYSTEMS:  Review of Systems    Physical Exam:      Vitals:    03/08/22 0928   BP: 128/85   Pulse: 69   Temp: 96.4 °F (35.8 °C)     Body mass index is 28.21 kg/m². Patient is a 73 y.o. male in no acute distress and alert and oriented to person, place and time. Physical Exam  Constitutional: Patient in no acute distress. Neuro: Alert and oriented to person, place and time. Psych: Mood normal, affect normal  Lungs: Respiratory effort is normal  Cardiovascular: Warm & Pink  Abdomen: Soft, non-tender, non-distended with no CVA,  No flank tenderness,  Or hepatosplenomegaly   Lymphatics: No palpablelymphadenopathy. Bladder non-tender and not distended. Musculoskeletal: Normal gait and station    Prostate:    Assessment and Plan      1. Elevated PSA    2. Erectile dysfunction, unspecified erectile dysfunction type    3. BPH with obstruction/lower urinary tract symptoms           Plan:          He is doing well  Continue Flomax. Continue Cialis. PSA is stable. Targeted biopsy was negative. F/U in 6 months with a PSA/COCO. Return in about 6 months (around 9/8/2022). Prescriptions Ordered:  Orders Placed This Encounter   Medications    tadalafil (CIALIS) 5 MG tablet     Sig: Take 1 tablet by mouth daily     Dispense:  90 tablet     Refill:  3     Orders Placed:  No orders of the defined types were placed in this encounter. Eloy Bacon MD    Agree with the ROS entered by the MA.

## 2022-03-30 ENCOUNTER — HOSPITAL ENCOUNTER (OUTPATIENT)
Age: 72
Setting detail: SPECIMEN
Discharge: HOME | End: 2022-03-30

## 2022-03-30 DIAGNOSIS — E87.0 HYPERNATREMIA: ICD-10-CM

## 2022-03-31 LAB
ANION GAP SERPL CALCULATED.3IONS-SCNC: 15 MMOL/L (ref 9–17)
BUN BLDV-MCNC: 16 MG/DL (ref 8–23)
CALCIUM SERPL-MCNC: 9.7 MG/DL (ref 8.6–10.4)
CHLORIDE BLD-SCNC: 100 MMOL/L (ref 98–107)
CO2: 25 MMOL/L (ref 20–31)
CREAT SERPL-MCNC: 1 MG/DL (ref 0.7–1.2)
GFR AFRICAN AMERICAN: >60 ML/MIN
GFR NON-AFRICAN AMERICAN: >60 ML/MIN
GFR SERPL CREATININE-BSD FRML MDRD: NORMAL ML/MIN/{1.73_M2}
GLUCOSE BLD-MCNC: 82 MG/DL (ref 70–99)
POTASSIUM SERPL-SCNC: 3.9 MMOL/L (ref 3.7–5.3)
SODIUM BLD-SCNC: 140 MMOL/L (ref 135–144)

## 2022-05-01 ENCOUNTER — HOSPITAL ENCOUNTER (EMERGENCY)
Age: 72
Discharge: HOME | End: 2022-05-01
Attending: STUDENT IN AN ORGANIZED HEALTH CARE EDUCATION/TRAINING PROGRAM
Payer: MEDICARE

## 2022-05-01 VITALS
SYSTOLIC BLOOD PRESSURE: 156 MMHG | BODY MASS INDEX: 27.32 KG/M2 | RESPIRATION RATE: 18 BRPM | OXYGEN SATURATION: 96 % | WEIGHT: 185 LBS | HEART RATE: 56 BPM | DIASTOLIC BLOOD PRESSURE: 82 MMHG | TEMPERATURE: 97.9 F

## 2022-05-01 DIAGNOSIS — M54.31 SCIATICA OF RIGHT SIDE: Primary | ICD-10-CM

## 2022-05-01 PROCEDURE — 96372 THER/PROPH/DIAG INJ SC/IM: CPT

## 2022-05-01 PROCEDURE — 6360000002 HC RX W HCPCS: Performed by: STUDENT IN AN ORGANIZED HEALTH CARE EDUCATION/TRAINING PROGRAM

## 2022-05-01 PROCEDURE — 99284 EMERGENCY DEPT VISIT MOD MDM: CPT

## 2022-05-01 PROCEDURE — 6370000000 HC RX 637 (ALT 250 FOR IP): Performed by: STUDENT IN AN ORGANIZED HEALTH CARE EDUCATION/TRAINING PROGRAM

## 2022-05-01 RX ORDER — KETOROLAC TROMETHAMINE 30 MG/ML
15 INJECTION, SOLUTION INTRAMUSCULAR; INTRAVENOUS ONCE
Status: COMPLETED | OUTPATIENT
Start: 2022-05-01 | End: 2022-05-01

## 2022-05-01 RX ORDER — LIDOCAINE 4 G/G
1 PATCH TOPICAL ONCE
Status: DISCONTINUED | OUTPATIENT
Start: 2022-05-01 | End: 2022-05-01 | Stop reason: HOSPADM

## 2022-05-01 RX ORDER — ACETAMINOPHEN 500 MG
1000 TABLET ORAL ONCE
Status: COMPLETED | OUTPATIENT
Start: 2022-05-01 | End: 2022-05-01

## 2022-05-01 RX ORDER — ORPHENADRINE CITRATE 30 MG/ML
60 INJECTION INTRAMUSCULAR; INTRAVENOUS ONCE
Status: COMPLETED | OUTPATIENT
Start: 2022-05-01 | End: 2022-05-01

## 2022-05-01 RX ORDER — METHYLPREDNISOLONE 4 MG/1
TABLET ORAL
Qty: 1 KIT | Refills: 0 | Status: SHIPPED | OUTPATIENT
Start: 2022-05-01 | End: 2022-05-07

## 2022-05-01 RX ORDER — MORPHINE SULFATE 4 MG/ML
4 INJECTION, SOLUTION INTRAMUSCULAR; INTRAVENOUS ONCE
Status: COMPLETED | OUTPATIENT
Start: 2022-05-01 | End: 2022-05-01

## 2022-05-01 RX ORDER — CYCLOBENZAPRINE HCL 10 MG
10 TABLET ORAL NIGHTLY PRN
Qty: 10 TABLET | Refills: 0 | Status: ON HOLD | OUTPATIENT
Start: 2022-05-01 | End: 2022-05-15 | Stop reason: HOSPADM

## 2022-05-01 RX ADMIN — ORPHENADRINE CITRATE 60 MG: 30 INJECTION INTRAMUSCULAR; INTRAVENOUS at 08:40

## 2022-05-01 RX ADMIN — ACETAMINOPHEN 1000 MG: 500 TABLET ORAL at 09:32

## 2022-05-01 RX ADMIN — KETOROLAC TROMETHAMINE 15 MG: 30 INJECTION, SOLUTION INTRAMUSCULAR at 08:41

## 2022-05-01 RX ADMIN — Medication 4 MG: at 09:34

## 2022-05-01 ASSESSMENT — ENCOUNTER SYMPTOMS
SHORTNESS OF BREATH: 0
COLOR CHANGE: 0
EYE REDNESS: 0
VOMITING: 0
DIARRHEA: 0
ABDOMINAL PAIN: 0
BACK PAIN: 1
FACIAL SWELLING: 0
NAUSEA: 0
EYE PAIN: 0
RHINORRHEA: 0
COUGH: 0
SORE THROAT: 0

## 2022-05-01 ASSESSMENT — PAIN DESCRIPTION - LOCATION: LOCATION: BACK

## 2022-05-01 ASSESSMENT — PAIN DESCRIPTION - FREQUENCY: FREQUENCY: CONTINUOUS

## 2022-05-01 ASSESSMENT — PAIN - FUNCTIONAL ASSESSMENT: PAIN_FUNCTIONAL_ASSESSMENT: 0-10

## 2022-05-01 ASSESSMENT — PAIN SCALES - GENERAL: PAINLEVEL_OUTOF10: 8

## 2022-05-01 ASSESSMENT — PAIN DESCRIPTION - PAIN TYPE: TYPE: ACUTE PAIN

## 2022-05-01 ASSESSMENT — PAIN DESCRIPTION - ORIENTATION: ORIENTATION: RIGHT

## 2022-05-01 NOTE — ED PROVIDER NOTES
EMERGENCY DEPARTMENT ENCOUNTER    Pt Name: Danell Dance  MRN: [id number]  [place] 1950  Date of evaluation: 5/1/22  CHIEF COMPLAINT       Chief Complaint   Patient presents with    Back Pain     HISTORY OF PRESENT ILLNESS   HPI  68-year-old male history of hypertension, cervical spondylosis presents for evaluation of low back pain and pain radiating around his right leg. Symptoms been present for several weeks with worsening over the past week. Reports history of similar symptoms of sciatica in the past.  No antecedent trauma. No fever chills. No saddle anesthesias. No urinary or bowel incontinence. Has been taking meloxicam at home with no relief. Pain is described as sharp and sore. It radiates around his right hip down his right leg. No numbness. Having some mild associated weakness in his right leg. REVIEW OF SYSTEMS     Review of Systems   Constitutional: Negative for chills and fatigue. HENT: Negative for facial swelling, nosebleeds, rhinorrhea and sore throat. Eyes: Negative for pain and redness. Respiratory: Negative for cough and shortness of breath. Cardiovascular: Negative for chest pain and leg swelling. Gastrointestinal: Negative for abdominal pain, diarrhea, nausea and vomiting. Genitourinary: Negative for flank pain and hematuria. Musculoskeletal: Positive for back pain. Negative for arthralgias. Skin: Negative for color change and rash. Neurological: Negative for dizziness, tremors, facial asymmetry, speech difficulty, weakness and numbness.      PASTMEDICAL HISTORY     Past Medical History:   Diagnosis Date    Basal cell carcinoma     Erectile dysfunction     Hearing aid worn     both ears    Hypertension     Sami Newberry, CNP    Precancerous skin lesion     Squamous cell carcinoma      Past Problem List  Patient Active Problem List   Diagnosis Code    Essential hypertension I10    Cervical spondylolysis M43.02    Neck pain M54.2    Coagulation defect ([facility].) D68.9     SURGICAL HISTORY       Past Surgical History:   Procedure Laterality Date    MALIGNANT SKIN LESION EXCISION      MOHS SURGERY      PRE-MALIGNANT / BENIGN SKIN LESION EXCISION      PROSTATE BIOPSY N/A 8/16/2021    FUSION PROSTATE BIOPSY AND ULTRASOUND performed by Sigrid Paredes MD at [facility]       Discharge Medication List as of 5/1/2022 10:12 AM      CONTINUE these medications which have NOT CHANGED    Details   tadalafil (CIALIS) 5 MG tablet Take 1 tablet by mouth daily, Disp-90 tablet, R-3Normal      lisinopril-hydroCHLOROthiazide (PRINZIDE;ZESTORETIC) 20-12.5 MG per tablet Take 1 tablet by mouth daily, Disp-90 tablet, R-1Normal      erythromycin (ROMYCIN) 5 MG/GM ophthalmic ointment Place into both eyes every 6 hours while awake, Both Eyes, EVERY 6 HOURS WHILE AWAKE Starting Wed 1/26/2022, Disp-3.5 g, R-0, Normal      tamsulosin (FLOMAX) 0.4 MG capsule TAKE 2 CAPSULES DAILY, Disp-180 capsule, R-3Normal      Flaxseed, Linseed, (FLAXSEED OIL PO) Take 1,000 mLs by mouthHistorical Med      Red Yeast Rice 600 MG CAPS Take 1,200 mg by mouth 2 times daily, Disp-360 capsule, R-0Adjust Sig      Cholecalciferol (VITAMIN D3) 25 MCG (1000 UT) CAPS Historical Med      Psyllium (METAMUCIL PO) Take by mouth daily Historical Med      Bioflavonoid Products (KATHRYN-C) 500-200-60 MG TABS Take 1 tablet by mouth daily           ALLERGIES     is allergic to influenza vaccines. FAMILY HISTORY     He indicated that the status of his mother is unknown. He indicated that the status of his neg hx is unknown. SOCIAL HISTORY       Social History     Tobacco Use    Smoking status: Never Smoker    Smokeless tobacco: Never Used   Substance Use Topics    Alcohol use:  Yes     Alcohol/week: 0.0 standard drinks     Comment: socially    Drug use: No     PHYSICAL EXAM     INITIAL VITALS: BP (!) 156/82   Pulse 56   Temp 97.9 °F (36.6 °C) (Axillary)   Resp 18   Wt 185 lb (83.9 kg)   SpO2 96%   BMI 27.32 kg/m²    Physical Exam  Vitals and nursing note reviewed. Constitutional:       Appearance: Normal appearance. HENT:      Head: Normocephalic and atraumatic. Nose: Nose normal.   Eyes:      Extraocular Movements: Extraocular movements intact. Pupils: Pupils are equal, round, and reactive to light. Cardiovascular:      Rate and Rhythm: Normal rate and regular rhythm. Pulmonary:      Effort: Pulmonary effort is normal. No respiratory distress. Breath sounds: Normal breath sounds. Abdominal:      General: Abdomen is flat. There is no distension. Palpations: Abdomen is soft. There is no mass. Musculoskeletal:         General: No swelling. Normal range of motion. Cervical back: Normal range of motion. No rigidity. Comments: No midline lumbar tenderness or deformity, extremities warm and well perfused soft compartments    Skin:     General: Skin is warm and dry. Neurological:      General: No focal deficit present. Mental Status: He is alert. Mental status is at baseline. Cranial Nerves: No cranial nerve deficit. Comments: Intact sensation bilateral lower extremities, 5/5 RLE strength, 5/5 LLE strength, 2+ patellar reflexes,          MEDICAL DECISION MAKINyear old male presents for evaluation of several weeks of worsening sciatica type symptoms. Will treat with anti-inflammatories, muscle relaxers, lidocaine patch, re-evaluate. Patient feeling better on reassessment. Do not suspect cauda equina, cord compression, epidural abscess, epidural hematoma, discitis, osteomyelitis. Discussed return precautions with patient he is comfortable going home, he will follow-up with Dr. Andria Day for further work-up.          CRITICAL CARE:       PROCEDURES:    Procedures    DIAGNOSTIC RESULTS   EKG:All EKG's are interpreted by the Emergency Department Physician who either signs or Co-signs this chart in the absence of a cardiologist.        RADIOLOGY:All plain film, CT, MRI, and formal ultrasound images (except ED bedside ultrasound) are read by the radiologist, see reports below, unless otherwisenoted in MDM or here. No orders to display     LABS: All lab results were reviewed by myself, and all abnormals are listed below. Labs Reviewed - No data to display    EMERGENCY DEPARTMENTCOURSE:         Vitals:    Vitals:    05/01/22 0808 05/01/22 0809 05/01/22 0810 05/01/22 0958   BP:   (!) 182/93 (!) 156/82   Pulse: 83   56   Resp: 18      Temp:  97.9 °F (36.6 °C)     TempSrc:  Axillary     SpO2: 98%   96%   Weight:  185 lb (83.9 kg)         The patient was given the following medications while in the emergency department:  Orders Placed This Encounter   Medications    ketorolac (TORADOL) injection 15 mg    orphenadrine (NORFLEX) injection 60 mg    DISCONTD: lidocaine 4 % external patch 1 patch    morphine sulfate (PF) injection 4 mg    acetaminophen (TYLENOL) tablet 1,000 mg    cyclobenzaprine (FLEXERIL) 10 MG tablet     Sig: Take 1 tablet by mouth nightly as needed for Muscle spasms     Dispense:  10 tablet     Refill:  0    methylPREDNISolone (MEDROL, JEOVANNY,) 4 MG tablet     Sig: Take by mouth. Dispense:  1 kit     Refill:  0     CONSULTS:  None    FINAL IMPRESSION      1.  Sciatica of right side          DISPOSITION/PLAN   DISPOSITION Decision To Discharge 05/01/2022 10:11:13 AM      PATIENT REFERRED TO:  TARA Adams - CNP  [address] [facility]  [address] [facility],[address]  [address] [facility] [phone]    Call   As needed    May MD Emily Canales [address], [address] [facility]  [address] [facility] [address] [phone]    Call in 1 day      DISCHARGE MEDICATIONS:  Discharge Medication List as of 5/1/2022 10:12 AM      START taking these medications    Details   cyclobenzaprine (FLEXERIL) 10 MG tablet Take 1 tablet by mouth nightly as needed for Muscle spasms, Disp-10 tablet, R-0Print      methylPREDNISolone (MEDROL, JEOVANNY,) 4 MG tablet Take by mouth., Disp-1 kit, R-0Print           The care is provided during an unprecedented national emergency due to the novel coronavirus, COVID 19.   MD Srinath Hopkins MD  05/01/22 7083

## 2022-05-01 NOTE — ED NOTES
Pt given instructions for follow-up and discharge. Pt verbalizes understanding. Pt is A&O x4, PWD, eupneic, and ambulatory with steady, even gait upon discharge.         Shahid Park RN  05/01/22 1023

## 2022-05-04 ENCOUNTER — HOSPITAL ENCOUNTER (OUTPATIENT)
Facility: CLINIC | Age: 72
Discharge: HOME | End: 2022-05-06
Payer: MEDICARE

## 2022-05-04 ENCOUNTER — HOSPITAL ENCOUNTER (OUTPATIENT)
Dept: GENERAL RADIOLOGY | Facility: CLINIC | Age: 72
Discharge: HOME | End: 2022-05-06
Payer: MEDICARE

## 2022-05-04 DIAGNOSIS — M54.16 ACUTE RIGHT LUMBAR RADICULOPATHY: ICD-10-CM

## 2022-05-04 DIAGNOSIS — M54.41 ACUTE RIGHT-SIDED LOW BACK PAIN WITH RIGHT-SIDED SCIATICA: ICD-10-CM

## 2022-05-04 PROCEDURE — 72100 X-RAY EXAM L-S SPINE 2/3 VWS: CPT

## 2022-05-07 ENCOUNTER — HOSPITAL ENCOUNTER (EMERGENCY)
Age: 72
Discharge: HOME | End: 2022-05-07
Attending: EMERGENCY MEDICINE
Payer: MEDICARE

## 2022-05-07 VITALS
OXYGEN SATURATION: 98 % | WEIGHT: 185 LBS | BODY MASS INDEX: 27.4 KG/M2 | RESPIRATION RATE: 18 BRPM | HEIGHT: 69 IN | TEMPERATURE: 97.6 F | HEART RATE: 80 BPM | SYSTOLIC BLOOD PRESSURE: 176 MMHG | DIASTOLIC BLOOD PRESSURE: 93 MMHG

## 2022-05-07 DIAGNOSIS — M54.31 SCIATICA, RIGHT SIDE: Primary | ICD-10-CM

## 2022-05-07 PROCEDURE — 6360000002 HC RX W HCPCS: Performed by: EMERGENCY MEDICINE

## 2022-05-07 PROCEDURE — 96372 THER/PROPH/DIAG INJ SC/IM: CPT

## 2022-05-07 PROCEDURE — 99284 EMERGENCY DEPT VISIT MOD MDM: CPT

## 2022-05-07 RX ORDER — MORPHINE SULFATE 4 MG/ML
4 INJECTION, SOLUTION INTRAMUSCULAR; INTRAVENOUS ONCE
Status: COMPLETED | OUTPATIENT
Start: 2022-05-07 | End: 2022-05-07

## 2022-05-07 RX ORDER — ORPHENADRINE CITRATE 30 MG/ML
60 INJECTION INTRAMUSCULAR; INTRAVENOUS ONCE
Status: COMPLETED | OUTPATIENT
Start: 2022-05-07 | End: 2022-05-07

## 2022-05-07 RX ORDER — HYDROCODONE BITARTRATE AND ACETAMINOPHEN 5; 325 MG/1; MG/1
1 TABLET ORAL EVERY 6 HOURS PRN
Qty: 10 TABLET | Refills: 0 | Status: SHIPPED | OUTPATIENT
Start: 2022-05-07 | End: 2022-05-10

## 2022-05-07 RX ADMIN — ORPHENADRINE CITRATE 60 MG: 30 INJECTION INTRAMUSCULAR; INTRAVENOUS at 08:53

## 2022-05-07 RX ADMIN — MORPHINE SULFATE 4 MG: 4 INJECTION, SOLUTION INTRAMUSCULAR; INTRAVENOUS at 08:54

## 2022-05-07 ASSESSMENT — ENCOUNTER SYMPTOMS
DIARRHEA: 0
NAUSEA: 0
COUGH: 0
SORE THROAT: 0
BLOOD IN STOOL: 0
VOMITING: 0
CONSTIPATION: 0
BACK PAIN: 1
ABDOMINAL PAIN: 0
COLOR CHANGE: 0
TROUBLE SWALLOWING: 0
SHORTNESS OF BREATH: 0

## 2022-05-07 ASSESSMENT — PAIN DESCRIPTION - DESCRIPTORS: DESCRIPTORS: BURNING;SHARP

## 2022-05-07 ASSESSMENT — PAIN - FUNCTIONAL ASSESSMENT
PAIN_FUNCTIONAL_ASSESSMENT: 0-10
PAIN_FUNCTIONAL_ASSESSMENT: PREVENTS OR INTERFERES WITH MANY ACTIVE NOT PASSIVE ACTIVITIES

## 2022-05-07 ASSESSMENT — PAIN DESCRIPTION - LOCATION: LOCATION: BACK

## 2022-05-07 ASSESSMENT — PAIN SCALES - GENERAL
PAINLEVEL_OUTOF10: 8
PAINLEVEL_OUTOF10: 8

## 2022-05-07 ASSESSMENT — PAIN DESCRIPTION - ONSET: ONSET: ON-GOING

## 2022-05-07 ASSESSMENT — PAIN DESCRIPTION - FREQUENCY: FREQUENCY: CONTINUOUS

## 2022-05-07 ASSESSMENT — PAIN DESCRIPTION - PAIN TYPE: TYPE: ACUTE PAIN

## 2022-05-07 ASSESSMENT — PAIN DESCRIPTION - ORIENTATION: ORIENTATION: RIGHT

## 2022-05-07 NOTE — TELEPHONE ENCOUNTER
Per Opal Ferrera CNP pt may take fleet enema first thing in the morning. Pt notified verbal understanding given call ended.
4

## 2022-05-07 NOTE — ED PROVIDER NOTES
[address]  EMERGENCY DEPARTMENT ENCOUNTER    Pt Name: Allie Cuellar  MRN: [id number]  [place]: 1950  Date of evaluation:5/7/22  PCP: Bryn Boast, APRN - [facility]       Chief Complaint   Patient presents with    Back Pain     Since April 22, 22       HISTORY OF PRESENT ILLNESS    Allie Cuellar is a 73 y.o. male who presents with a chief complaint of back pain. Patient has had severe pain in his lower back radiating into his right leg since the end of April. He has been here and diagnosed with sciatica. He actually did follow-up with his PCP, went to therapy 3 separate times but states his pain is now worse. It radiates down the back of his leg, front of his leg and into his groin. No symptoms down his left leg. He does have some numbness in the leg as well. He states he woke up this morning with pain worse than it has ever been. No difficulty with bowel or bladder habits. No fevers or chills. No falls or specific trauma. Symptoms are acute. Symptoms are severe. Nothing else make symptoms better or worse. Patient has no other complaints at this time. REVIEW OF SYSTEMS       Review of Systems   Constitutional: Negative for chills, fatigue and fever. HENT: Negative for congestion, ear pain, sore throat and trouble swallowing. Eyes: Negative for visual disturbance. Respiratory: Negative for cough and shortness of breath. Cardiovascular: Negative for chest pain, palpitations and leg swelling. Gastrointestinal: Negative for abdominal pain, blood in stool, constipation, diarrhea, nausea and vomiting. Genitourinary: Negative for dysuria and flank pain. Musculoskeletal: Positive for back pain. Negative for arthralgias, myalgias and neck pain. Skin: Negative for color change, rash and wound. Neurological: Positive for numbness. Negative for dizziness, weakness, light-headedness and headaches. Psychiatric/Behavioral: Negative for confusion. All other systems reviewed and are negative. Negative in 10 essential Systems except as mentioned above and in the HPI. PAST MEDICAL HISTORY     Past Medical History:   Diagnosis Date    Basal cell carcinoma     Erectile dysfunction     Hearing aid worn     both ears    Hypertension     Vesta Retort, CNP    Precancerous skin lesion     Squamous cell carcinoma          SURGICAL HISTORY      has a past surgical history that includes pre-malignant / benign skin lesion excision; Mohs surgery; malignant skin lesion excision; Tonsillectomy; and Prostate biopsy (N/A, 8/16/2021). CURRENT MEDICATIONS       Previous Medications    BIOFLAVONOID PRODUCTS (KATHRYN-C) 500-200-60 MG TABS    Take 1 tablet by mouth daily    CHOLECALCIFEROL (VITAMIN D3) 25 MCG (1000 UT) CAPS        CYCLOBENZAPRINE (FLEXERIL) 10 MG TABLET    Take 1 tablet by mouth nightly as needed for Muscle spasms    ERYTHROMYCIN (ROMYCIN) 5 MG/GM OPHTHALMIC OINTMENT    Place into both eyes every 6 hours while awake    FLAXSEED, LINSEED, (FLAXSEED OIL PO)    Take 1,000 mLs by mouth    LISINOPRIL-HYDROCHLOROTHIAZIDE (PRINZIDE;ZESTORETIC) 20-12.5 MG PER TABLET    Take 1 tablet by mouth daily    METHYLPREDNISOLONE (MEDROL, JEOVANNY,) 4 MG TABLET    Take by mouth. PREDNISONE (DELTASONE) 10 MG TABLET    3 tabs for 3 days, 2 tabs for 3 days, 1 tab for 3 days    PSYLLIUM (METAMUCIL PO)    Take by mouth daily     RED YEAST RICE 600 MG CAPS    Take 1,200 mg by mouth 2 times daily    TADALAFIL (CIALIS) 5 MG TABLET    Take 1 tablet by mouth daily    TAMSULOSIN (FLOMAX) 0.4 MG CAPSULE    TAKE 2 CAPSULES DAILY       ALLERGIES     is allergic to influenza vaccines. FAMILY HISTORY     He indicated that the status of his mother is unknown. He indicated that the status of his neg hx is unknown.     family history includes High Blood Pressure in his mother. SOCIAL HISTORY      reports that he has never smoked.  He has never used smokeless tobacco. He reports current alcohol use. He reports that he does not use drugs. PHYSICAL EXAM     INITIAL VITALS:  height is 5' 9\" (1.753 m) and weight is 185 lb (83.9 kg). His oral temperature is 97.6 °F (36.4 °C). His blood pressure is 176/93 (abnormal) and his pulse is 80. His respiration is 18 and oxygen saturation is 98%. Physical Exam  Vitals and nursing note reviewed. Constitutional:       General: He is not in acute distress. HENT:      Head: Normocephalic and atraumatic. Eyes:      Conjunctiva/sclera: Conjunctivae normal.      Pupils: Pupils are equal, round, and reactive to light. Cardiovascular:      Rate and Rhythm: Normal rate and regular rhythm. Heart sounds: Normal heart sounds. No murmur heard. Pulmonary:      Effort: Pulmonary effort is normal. No respiratory distress. Breath sounds: Normal breath sounds. Abdominal:      General: Bowel sounds are normal. There is no distension. Palpations: Abdomen is soft. Tenderness: There is no abdominal tenderness. Musculoskeletal:         General: No tenderness. Cervical back: Neck supple. Lymphadenopathy:      Cervical: No cervical adenopathy. Skin:     General: Skin is warm and dry. Findings: No rash. Neurological:      Mental Status: He is alert and oriented to person, place, and time. Motor: Weakness (Right lower extremity) present. Psychiatric:         Judgment: Judgment normal.           DIFFERENTIAL DIAGNOSIS/MDM:   71-year-old male presents with continued right lower extremity pain, right lower back pain and numbness. He is afebrile, nontoxic, hypertensive otherwise vital signs normal.  No acute distress. He is difficult to examine because he does not want to get out of his wheelchair but does seem to have some weakness in his right lower extremity. He is neurovascularly intact however. He has no midline pain. I have a very low suspicion for discitis, osteomyelitis, epidural abscess.   Suspect that he does have severe sciatica. We will see if we can get his pain under control. If not we may need to get an MRI. DIAGNOSTIC RESULTS     EKG: All EKG's are interpreted by the Emergency Department Physician who either signs or Co-signs this chart in the absence of a cardiologist.        RADIOLOGY:   I directly visualized the following  images and reviewed the radiologist interpretations:  No orders to display           ED BEDSIDE ULTRASOUND:      LABS:  Labs Reviewed - No data to display      EMERGENCY DEPARTMENT COURSE:   Vitals:    Vitals:    05/07/22 0813   BP: (!) 176/93   Pulse: 80   Resp: 18   Temp: 97.6 °F (36.4 °C)   TempSrc: Oral   SpO2: 98%   Weight: 185 lb (83.9 kg)   Height: 5' 9\" (1.753 m)     Patient feels a lot better at this time after medications. He seems much more comfortable. Again I have a very low suspicion for cauda equina syndrome. Advised him to follow-up with his PCP, be compliant with his physical therapy and to return if any symptoms worsen. He is agreeable plan will be discharged home today. CRITICALCARE:      CONSULTS:  None      PROCEDURES:      FINAL IMPRESSION      1. Sciatica, right side            DISPOSITION/PLAN   DISPOSITION Decision To Discharge 05/07/2022 09:32:32 AM          PATIENT REFERRED TO:  TARA Barker - CNP  [address] [address] [phone]    Schedule an appointment as soon as possible for a visit       [facility] ED  [facility], [facility] [address] [phone]    As needed, If symptoms worsen      DISCHARGE MEDICATIONS:  New Prescriptions    HYDROCODONE-ACETAMINOPHEN (NORCO) 5-325 MG PER TABLET    Take 1 tablet by mouth every 6 hours as needed for Pain for up to 3 days. The care is provided during an unprecedented national emergency due to the novel coronavirus, COVID-19.     (Please note that portions ofthis note were completed with a voice recognition program.  Efforts were made to edit the dictations but occasionally words are mis-transcribed.)    Chante Silva DO  Attending Emergency Physician          Chante Silva DO  05/07/22 9943

## 2022-05-11 ENCOUNTER — HOSPITAL ENCOUNTER (EMERGENCY)
Age: 72
Discharge: ANOTHER HOSPITAL | End: 2022-05-11
Attending: EMERGENCY MEDICINE
Payer: MEDICARE

## 2022-05-11 ENCOUNTER — APPOINTMENT (OUTPATIENT)
Dept: MRI IMAGING | Age: 72
End: 2022-05-11
Payer: MEDICARE

## 2022-05-11 ENCOUNTER — HOSPITAL ENCOUNTER (INPATIENT)
Age: 72
LOS: 4 days | Discharge: HOME | DRG: 552 | End: 2022-05-15
Attending: STUDENT IN AN ORGANIZED HEALTH CARE EDUCATION/TRAINING PROGRAM | Admitting: INTERNAL MEDICINE
Payer: MEDICARE

## 2022-05-11 VITALS
TEMPERATURE: 98.8 F | SYSTOLIC BLOOD PRESSURE: 161 MMHG | RESPIRATION RATE: 12 BRPM | DIASTOLIC BLOOD PRESSURE: 79 MMHG | HEIGHT: 69 IN | BODY MASS INDEX: 27.4 KG/M2 | WEIGHT: 185 LBS | HEART RATE: 77 BPM | OXYGEN SATURATION: 99 %

## 2022-05-11 DIAGNOSIS — R33.9 URINARY RETENTION: ICD-10-CM

## 2022-05-11 DIAGNOSIS — M54.41 ACUTE RIGHT-SIDED LOW BACK PAIN WITH RIGHT-SIDED SCIATICA: Primary | ICD-10-CM

## 2022-05-11 DIAGNOSIS — M48.00 CENTRAL STENOSIS OF SPINAL CANAL: ICD-10-CM

## 2022-05-11 PROBLEM — R52 INTRACTABLE PAIN: Status: ACTIVE | Noted: 2022-05-11

## 2022-05-11 LAB
ABSOLUTE EOS #: 0.17 K/UL (ref 0–0.4)
ABSOLUTE LYMPH #: 2.09 K/UL (ref 1–4.8)
ABSOLUTE MONO #: 0.52 K/UL (ref 0.1–1.3)
ALBUMIN SERPL-MCNC: 4.4 G/DL (ref 3.5–5.2)
ALP BLD-CCNC: 51 U/L (ref 40–129)
ALT SERPL-CCNC: 31 U/L (ref 5–41)
ANION GAP SERPL CALCULATED.3IONS-SCNC: 12 MMOL/L (ref 9–17)
AST SERPL-CCNC: 20 U/L
BASOPHILS # BLD: 0 % (ref 0–2)
BASOPHILS ABSOLUTE: 0 K/UL (ref 0–0.2)
BILIRUB SERPL-MCNC: 0.44 MG/DL (ref 0.3–1.2)
BILIRUBIN URINE: NEGATIVE
BUN BLDV-MCNC: 21 MG/DL (ref 8–23)
CALCIUM SERPL-MCNC: 9.6 MG/DL (ref 8.6–10.4)
CHLORIDE BLD-SCNC: 102 MMOL/L (ref 98–107)
CO2: 26 MMOL/L (ref 20–31)
COLOR: YELLOW
COMMENT UA: NORMAL
CREAT SERPL-MCNC: 1.07 MG/DL (ref 0.7–1.2)
EOSINOPHILS RELATIVE PERCENT: 3 % (ref 0–4)
GFR AFRICAN AMERICAN: >60 ML/MIN
GFR NON-AFRICAN AMERICAN: >60 ML/MIN
GFR SERPL CREATININE-BSD FRML MDRD: ABNORMAL ML/MIN/{1.73_M2}
GLUCOSE BLD-MCNC: 111 MG/DL (ref 70–99)
GLUCOSE URINE: NEGATIVE
HCT VFR BLD CALC: 46.8 % (ref 41–53)
HEMOGLOBIN: 16.1 G/DL (ref 13.5–17.5)
KETONES, URINE: NEGATIVE
LEUKOCYTE ESTERASE, URINE: NEGATIVE
LYMPHOCYTES # BLD: 36 % (ref 24–44)
MCH RBC QN AUTO: 28.7 PG (ref 26–34)
MCHC RBC AUTO-ENTMCNC: 34.3 G/DL (ref 31–37)
MCV RBC AUTO: 83.7 FL (ref 80–100)
MONOCYTES # BLD: 9 % (ref 1–7)
MORPHOLOGY: ABNORMAL
NITRITE, URINE: NEGATIVE
PDW BLD-RTO: 15 % (ref 11.5–14.9)
PH UA: 6 (ref 5–8)
PLATELET # BLD: 128 K/UL (ref 150–450)
PMV BLD AUTO: 8.8 FL (ref 6–12)
POTASSIUM SERPL-SCNC: 4 MMOL/L (ref 3.7–5.3)
PROTEIN UA: NEGATIVE
RBC # BLD: 5.6 M/UL (ref 4.5–5.9)
SEG NEUTROPHILS: 52 % (ref 36–66)
SEGMENTED NEUTROPHILS ABSOLUTE COUNT: 3.02 K/UL (ref 1.3–9.1)
SODIUM BLD-SCNC: 140 MMOL/L (ref 135–144)
SPECIFIC GRAVITY UA: 1.02 (ref 1–1.03)
TOTAL PROTEIN: 6.9 G/DL (ref 6.4–8.3)
TURBIDITY: CLEAR
URINE HGB: NEGATIVE
UROBILINOGEN, URINE: NORMAL
WBC # BLD: 5.8 K/UL (ref 3.5–11)

## 2022-05-11 PROCEDURE — 2580000003 HC RX 258: Performed by: STUDENT IN AN ORGANIZED HEALTH CARE EDUCATION/TRAINING PROGRAM

## 2022-05-11 PROCEDURE — 72148 MRI LUMBAR SPINE W/O DYE: CPT

## 2022-05-11 PROCEDURE — 36415 COLL VENOUS BLD VENIPUNCTURE: CPT

## 2022-05-11 PROCEDURE — 80053 COMPREHEN METABOLIC PANEL: CPT

## 2022-05-11 PROCEDURE — 99223 1ST HOSP IP/OBS HIGH 75: CPT | Performed by: STUDENT IN AN ORGANIZED HEALTH CARE EDUCATION/TRAINING PROGRAM

## 2022-05-11 PROCEDURE — 87086 URINE CULTURE/COLONY COUNT: CPT

## 2022-05-11 PROCEDURE — 96375 TX/PRO/DX INJ NEW DRUG ADDON: CPT

## 2022-05-11 PROCEDURE — 6370000000 HC RX 637 (ALT 250 FOR IP): Performed by: STUDENT IN AN ORGANIZED HEALTH CARE EDUCATION/TRAINING PROGRAM

## 2022-05-11 PROCEDURE — 6360000002 HC RX W HCPCS: Performed by: STUDENT IN AN ORGANIZED HEALTH CARE EDUCATION/TRAINING PROGRAM

## 2022-05-11 PROCEDURE — 6360000002 HC RX W HCPCS: Performed by: EMERGENCY MEDICINE

## 2022-05-11 PROCEDURE — 1200000000 HC SEMI PRIVATE

## 2022-05-11 PROCEDURE — 96374 THER/PROPH/DIAG INJ IV PUSH: CPT

## 2022-05-11 PROCEDURE — 6370000000 HC RX 637 (ALT 250 FOR IP): Performed by: EMERGENCY MEDICINE

## 2022-05-11 PROCEDURE — 85025 COMPLETE CBC W/AUTO DIFF WBC: CPT

## 2022-05-11 PROCEDURE — 99285 EMERGENCY DEPT VISIT HI MDM: CPT

## 2022-05-11 PROCEDURE — 81003 URINALYSIS AUTO W/O SCOPE: CPT

## 2022-05-11 PROCEDURE — 96372 THER/PROPH/DIAG INJ SC/IM: CPT

## 2022-05-11 PROCEDURE — 96376 TX/PRO/DX INJ SAME DRUG ADON: CPT

## 2022-05-11 RX ORDER — ONDANSETRON 4 MG/1
4 TABLET, ORALLY DISINTEGRATING ORAL EVERY 8 HOURS PRN
Status: DISCONTINUED | OUTPATIENT
Start: 2022-05-11 | End: 2022-05-15 | Stop reason: HOSPADM

## 2022-05-11 RX ORDER — TAMSULOSIN HYDROCHLORIDE 0.4 MG/1
0.8 CAPSULE ORAL DAILY
Status: DISCONTINUED | OUTPATIENT
Start: 2022-05-11 | End: 2022-05-15 | Stop reason: HOSPADM

## 2022-05-11 RX ORDER — MORPHINE SULFATE 2 MG/ML
2 INJECTION, SOLUTION INTRAMUSCULAR; INTRAVENOUS
Status: DISCONTINUED | OUTPATIENT
Start: 2022-05-11 | End: 2022-05-12

## 2022-05-11 RX ORDER — MAGNESIUM SULFATE 1 G/100ML
1000 INJECTION INTRAVENOUS PRN
Status: DISCONTINUED | OUTPATIENT
Start: 2022-05-11 | End: 2022-05-15 | Stop reason: HOSPADM

## 2022-05-11 RX ORDER — LIDOCAINE 4 G/G
1 PATCH TOPICAL ONCE
Status: DISCONTINUED | OUTPATIENT
Start: 2022-05-11 | End: 2022-05-11 | Stop reason: HOSPADM

## 2022-05-11 RX ORDER — MORPHINE SULFATE 4 MG/ML
4 INJECTION, SOLUTION INTRAMUSCULAR; INTRAVENOUS
Status: DISCONTINUED | OUTPATIENT
Start: 2022-05-11 | End: 2022-05-12

## 2022-05-11 RX ORDER — GABAPENTIN 100 MG/1
100 CAPSULE ORAL 3 TIMES DAILY
Status: DISCONTINUED | OUTPATIENT
Start: 2022-05-11 | End: 2022-05-15 | Stop reason: HOSPADM

## 2022-05-11 RX ORDER — SODIUM CHLORIDE 9 MG/ML
INJECTION, SOLUTION INTRAVENOUS CONTINUOUS
Status: DISCONTINUED | OUTPATIENT
Start: 2022-05-11 | End: 2022-05-12

## 2022-05-11 RX ORDER — POTASSIUM CHLORIDE 20 MEQ/1
40 TABLET, EXTENDED RELEASE ORAL PRN
Status: DISCONTINUED | OUTPATIENT
Start: 2022-05-11 | End: 2022-05-15 | Stop reason: HOSPADM

## 2022-05-11 RX ORDER — SODIUM CHLORIDE 9 MG/ML
INJECTION, SOLUTION INTRAVENOUS PRN
Status: DISCONTINUED | OUTPATIENT
Start: 2022-05-11 | End: 2022-05-15 | Stop reason: HOSPADM

## 2022-05-11 RX ORDER — DEXAMETHASONE SODIUM PHOSPHATE 10 MG/ML
10 INJECTION, SOLUTION INTRAMUSCULAR; INTRAVENOUS ONCE
Status: COMPLETED | OUTPATIENT
Start: 2022-05-11 | End: 2022-05-11

## 2022-05-11 RX ORDER — TADALAFIL 5 MG/1
5 TABLET ORAL DAILY
Status: DISCONTINUED | OUTPATIENT
Start: 2022-05-11 | End: 2022-05-15 | Stop reason: HOSPADM

## 2022-05-11 RX ORDER — ACETAMINOPHEN 325 MG/1
650 TABLET ORAL EVERY 6 HOURS PRN
Status: DISCONTINUED | OUTPATIENT
Start: 2022-05-11 | End: 2022-05-12

## 2022-05-11 RX ORDER — ACETAMINOPHEN 650 MG/1
650 SUPPOSITORY RECTAL EVERY 6 HOURS PRN
Status: DISCONTINUED | OUTPATIENT
Start: 2022-05-11 | End: 2022-05-12

## 2022-05-11 RX ORDER — CYCLOBENZAPRINE HCL 10 MG
10 TABLET ORAL 2 TIMES DAILY
Status: DISCONTINUED | OUTPATIENT
Start: 2022-05-11 | End: 2022-05-12

## 2022-05-11 RX ORDER — MORPHINE SULFATE 4 MG/ML
4 INJECTION, SOLUTION INTRAMUSCULAR; INTRAVENOUS ONCE
Status: COMPLETED | OUTPATIENT
Start: 2022-05-11 | End: 2022-05-11

## 2022-05-11 RX ORDER — SODIUM CHLORIDE 0.9 % (FLUSH) 0.9 %
10 SYRINGE (ML) INJECTION PRN
Status: DISCONTINUED | OUTPATIENT
Start: 2022-05-11 | End: 2022-05-15 | Stop reason: HOSPADM

## 2022-05-11 RX ORDER — KETOROLAC TROMETHAMINE 15 MG/ML
15 INJECTION, SOLUTION INTRAMUSCULAR; INTRAVENOUS EVERY 6 HOURS PRN
Status: DISPENSED | OUTPATIENT
Start: 2022-05-11 | End: 2022-05-13

## 2022-05-11 RX ORDER — ENOXAPARIN SODIUM 100 MG/ML
40 INJECTION SUBCUTANEOUS DAILY
Status: DISCONTINUED | OUTPATIENT
Start: 2022-05-11 | End: 2022-05-15 | Stop reason: HOSPADM

## 2022-05-11 RX ORDER — SODIUM CHLORIDE 0.9 % (FLUSH) 0.9 %
5-40 SYRINGE (ML) INJECTION EVERY 12 HOURS SCHEDULED
Status: DISCONTINUED | OUTPATIENT
Start: 2022-05-11 | End: 2022-05-15 | Stop reason: HOSPADM

## 2022-05-11 RX ORDER — ORPHENADRINE CITRATE 30 MG/ML
60 INJECTION INTRAMUSCULAR; INTRAVENOUS ONCE
Status: COMPLETED | OUTPATIENT
Start: 2022-05-11 | End: 2022-05-11

## 2022-05-11 RX ORDER — ONDANSETRON 2 MG/ML
4 INJECTION INTRAMUSCULAR; INTRAVENOUS EVERY 6 HOURS PRN
Status: DISCONTINUED | OUTPATIENT
Start: 2022-05-11 | End: 2022-05-15 | Stop reason: HOSPADM

## 2022-05-11 RX ORDER — POLYETHYLENE GLYCOL 3350 17 G/17G
17 POWDER, FOR SOLUTION ORAL DAILY PRN
Status: DISCONTINUED | OUTPATIENT
Start: 2022-05-11 | End: 2022-05-15 | Stop reason: HOSPADM

## 2022-05-11 RX ORDER — POTASSIUM CHLORIDE 7.45 MG/ML
10 INJECTION INTRAVENOUS PRN
Status: DISCONTINUED | OUTPATIENT
Start: 2022-05-11 | End: 2022-05-15 | Stop reason: HOSPADM

## 2022-05-11 RX ORDER — LISINOPRIL AND HYDROCHLOROTHIAZIDE 20; 12.5 MG/1; MG/1
1 TABLET ORAL DAILY
Status: DISCONTINUED | OUTPATIENT
Start: 2022-05-11 | End: 2022-05-15 | Stop reason: HOSPADM

## 2022-05-11 RX ADMIN — CYCLOBENZAPRINE 10 MG: 10 TABLET, FILM COATED ORAL at 20:37

## 2022-05-11 RX ADMIN — SODIUM CHLORIDE: 9 INJECTION, SOLUTION INTRAVENOUS at 18:40

## 2022-05-11 RX ADMIN — SODIUM CHLORIDE, PRESERVATIVE FREE 10 ML: 5 INJECTION INTRAVENOUS at 20:38

## 2022-05-11 RX ADMIN — HYDROMORPHONE HYDROCHLORIDE 1 MG: 1 INJECTION, SOLUTION INTRAMUSCULAR; INTRAVENOUS; SUBCUTANEOUS at 12:18

## 2022-05-11 RX ADMIN — MORPHINE SULFATE 4 MG: 4 INJECTION, SOLUTION INTRAMUSCULAR; INTRAVENOUS at 05:41

## 2022-05-11 RX ADMIN — LISINOPRIL AND HYDROCHLOROTHIAZIDE 1 TABLET: 12.5; 2 TABLET ORAL at 19:42

## 2022-05-11 RX ADMIN — HYDROMORPHONE HYDROCHLORIDE 1 MG: 1 INJECTION, SOLUTION INTRAMUSCULAR; INTRAVENOUS; SUBCUTANEOUS at 16:29

## 2022-05-11 RX ADMIN — MORPHINE SULFATE 4 MG: 4 INJECTION INTRAVENOUS at 19:38

## 2022-05-11 RX ADMIN — MORPHINE SULFATE 4 MG: 4 INJECTION INTRAVENOUS at 22:52

## 2022-05-11 RX ADMIN — ORPHENADRINE CITRATE 60 MG: 30 INJECTION INTRAMUSCULAR; INTRAVENOUS at 05:50

## 2022-05-11 RX ADMIN — TAMSULOSIN HYDROCHLORIDE 0.8 MG: 0.4 CAPSULE ORAL at 18:40

## 2022-05-11 RX ADMIN — DEXAMETHASONE SODIUM PHOSPHATE 10 MG: 10 INJECTION, SOLUTION INTRAMUSCULAR; INTRAVENOUS at 05:44

## 2022-05-11 RX ADMIN — MORPHINE SULFATE 4 MG: 4 INJECTION, SOLUTION INTRAMUSCULAR; INTRAVENOUS at 06:43

## 2022-05-11 RX ADMIN — GABAPENTIN 100 MG: 300 CAPSULE ORAL at 20:37

## 2022-05-11 ASSESSMENT — PAIN SCALES - GENERAL
PAINLEVEL_OUTOF10: 9
PAINLEVEL_OUTOF10: 8
PAINLEVEL_OUTOF10: 10
PAINLEVEL_OUTOF10: 9
PAINLEVEL_OUTOF10: 7
PAINLEVEL_OUTOF10: 8
PAINLEVEL_OUTOF10: 10
PAINLEVEL_OUTOF10: 8
PAINLEVEL_OUTOF10: 8

## 2022-05-11 ASSESSMENT — PAIN DESCRIPTION - ORIENTATION
ORIENTATION: RIGHT
ORIENTATION: RIGHT
ORIENTATION: LOWER
ORIENTATION: RIGHT
ORIENTATION: RIGHT

## 2022-05-11 ASSESSMENT — ENCOUNTER SYMPTOMS
DIARRHEA: 0
COUGH: 0
SHORTNESS OF BREATH: 0
BACK PAIN: 1
ABDOMINAL PAIN: 0
ABDOMINAL DISTENTION: 0
SHORTNESS OF BREATH: 0
ABDOMINAL PAIN: 0
COUGH: 0
BACK PAIN: 1
COLOR CHANGE: 0
VOMITING: 0

## 2022-05-11 ASSESSMENT — PAIN DESCRIPTION - DESCRIPTORS
DESCRIPTORS: ACHING

## 2022-05-11 ASSESSMENT — PAIN DESCRIPTION - LOCATION
LOCATION: BUTTOCKS;LEG
LOCATION: LEG
LOCATION: HIP
LOCATION: BACK
LOCATION: LEG

## 2022-05-11 ASSESSMENT — PAIN - FUNCTIONAL ASSESSMENT: PAIN_FUNCTIONAL_ASSESSMENT: 0-10

## 2022-05-11 ASSESSMENT — PAIN DESCRIPTION - FREQUENCY: FREQUENCY: CONTINUOUS

## 2022-05-11 NOTE — ED PROVIDER NOTES
ADDENDUM:        Care of this patient was assumed from Dr. He Mcguire    at  0700    . The patient was seen for Leg Pain  . The patient's initial evaluation and plan have been discussed with the prior provider who initially evaluated the patient. Nursing Notes, Past Medical Hx, Past Surgical Hx, Social Hx, Allergies, and Family Hx were all reviewed. I performed a repeat evaluation of the patient and reviewed tests completed so far.  8:29 AM EDT  Patient having urinary retention 700 ml post void residual  RN to place estelle gan patient   He is having severe back pain  Unable to ambulate, too painful, moving from chair to chair at home  He states he has chronic prostrate issues, not new  MRI results reviewed  Calling Dr Luis Roche orthospine    Strength in arms 5/5  Strength in leg  5/5 left and 4/5 right  Sensation intact bl legs    8:35 AM EDT  Patient still uanble to ambulate, in too much pain  8:42 AM EDT  ESTELLE Johnson, he is on vacation. 9:43 AM EDT  ESTELLE Miguel neurosurgery [facility] he rec transfer to hospitalist at [facility]  Gan placed by ED RN bc his post void residual is 700 ml    10:00 AM EDT  ESTELLE alejandra accepts transfer at [facility]      ED Course        The patient was given the following medications:  Orders Placed This Encounter   Medications    lidocaine 4 % external patch 1 patch    morphine sulfate (PF) injection 4 mg    dexamethasone (PF) (DECADRON) injection 10 mg    orphenadrine (NORFLEX) injection 60 mg    morphine sulfate (PF) injection 4 mg       RECENT VITALS:  BP: (!) 161/79, Temp: 98.8 °F (37.1 °C), Pulse: 66, Resp: 16     RADIOLOGY:All plain film, CT, MRI, and formal ultrasound images (except ED bedside ultrasound) are read by the radiologist and the images and interpretations are directly viewed by the emergency physician. MRI LUMBAR SPINE WO CONTRAST   Final Result   1.  Multilevel lumbar spondylosis superimposed on a developmentally narrow   canal due to short pedicles resulting in upwards of moderate canal stenosis   at L3-4, mild to moderate canal stenosis at L4-5.   2. Severe right-sided foraminal stenosis at L4-5.   3. Markedly distended bladder. Correlate with concerns for outlet   obstruction. LABS: All lab results were reviewed by myself, and all abnormals are listed below. Labs Reviewed   CULTURE, URINE   URINALYSIS WITH REFLEX TO CULTURE   CBC WITH AUTO DIFFERENTIAL   COMPREHENSIVE METABOLIC PANEL           Disposition   DISPOSITION:    DISPOSITION        CLINICAL IMPRESSION:  1. Acute right-sided low back pain with right-sided sciatica    2. Urinary retention    3. Central stenosis of spinal canal        PATIENT REFERRED TO:  No follow-up provider specified. DISCHARGE MEDICATIONS:  New Prescriptions    No medications on file     The care is provided during an unprecedented national emergency due to the novel coronavirus, COVID 19.   Alexa Coburn MD  Attending Emergency Physician              Alexa Coburn MD  05/11/22 4226

## 2022-05-11 NOTE — ED NOTES
Bladder scan complete. 785 mL urine in bladder after voiding 300 mL.      Sarah Davenport RN  05/11/22 6633

## 2022-05-11 NOTE — ED PROVIDER NOTES
EMERGENCY DEPARTMENT ENCOUNTER    Pt Name: Raymon Vidal  MRN: [id number]  [place] 1950  Date of evaluation: 5/11/22  CHIEF COMPLAINT       Chief Complaint   Patient presents with    Leg Pain     HISTORY OF PRESENT ILLNESS   HPI     This is a 68-year-old male comes in today with right-sided back pain. Patient states that he has a history of sciatica a flared up a couple years ago but this month he started developing right-sided back pain radiating down into his leg. He came to the emergency department on the first he was discharged with muscle relaxers. He had a telemetry conversation with his nurse practitioner on the third where they recommended physical therapy. The patient has been trying acupuncture chiropractic and physical therapy without any improvement of his symptoms. Patient again went to the emergency department on the seventh. He is also had an x-ray. He is supposed to have an MRI scheduled for a little over a week from now. He denies any bowel or bladder incontinence he describes numbness on the back of his leg    REVIEW OF SYSTEMS     Review of Systems   Constitutional: Negative for fever. HENT: Negative for congestion. Respiratory: Negative for cough and shortness of breath. Cardiovascular: Negative for chest pain. Gastrointestinal: Negative for abdominal pain, diarrhea and vomiting. Genitourinary: Negative for dysuria. Musculoskeletal: Positive for back pain. Skin: Negative for rash. Neurological: Positive for numbness. Negative for headaches. All other systems reviewed and are negative.     PASTMEDICAL HISTORY     Past Medical History:   Diagnosis Date    Basal cell carcinoma     Erectile dysfunction     Hearing aid worn     both ears    Hypertension     Eddi Dewey, CNP    Precancerous skin lesion     Squamous cell carcinoma      SURGICAL HISTORY       Past Surgical History:   Procedure Laterality Date    MALIGNANT SKIN LESION EXCISION      MOHS SURGERY  PRE-MALIGNANT / BENIGN SKIN LESION EXCISION      PROSTATE BIOPSY N/A 8/16/2021    FUSION PROSTATE BIOPSY AND ULTRASOUND performed by Maura Reddy MD at [address]       Previous Medications    BIOFLAVONOID PRODUCTS (KATHRYN-C) 500-200-60 MG TABS    Take 1 tablet by mouth daily    CHOLECALCIFEROL (VITAMIN D3) 25 MCG (1000 UT) CAPS        CYCLOBENZAPRINE (FLEXERIL) 10 MG TABLET    Take 1 tablet by mouth nightly as needed for Muscle spasms    ERYTHROMYCIN (ROMYCIN) 5 MG/GM OPHTHALMIC OINTMENT    Place into both eyes every 6 hours while awake    FLAXSEED, LINSEED, (FLAXSEED OIL PO)    Take 1,000 mLs by mouth    LISINOPRIL-HYDROCHLOROTHIAZIDE (PRINZIDE;ZESTORETIC) 20-12.5 MG PER TABLET    Take 1 tablet by mouth daily    PREDNISONE (DELTASONE) 10 MG TABLET    3 tabs for 3 days, 2 tabs for 3 days, 1 tab for 3 days    PSYLLIUM (METAMUCIL PO)    Take by mouth daily     RED YEAST RICE 600 MG CAPS    Take 1,200 mg by mouth 2 times daily    TADALAFIL (CIALIS) 5 MG TABLET    Take 1 tablet by mouth daily    TAMSULOSIN (FLOMAX) 0.4 MG CAPSULE    TAKE 2 CAPSULES DAILY     ALLERGIES     is allergic to influenza vaccines. FAMILY HISTORY     He indicated that the status of his mother is unknown. He indicated that the status of his neg hx is unknown. SOCIAL HISTORY       Social History     Tobacco Use    Smoking status: Never Smoker    Smokeless tobacco: Never Used   Substance Use Topics    Alcohol use: Not Currently     Alcohol/week: 0.0 standard drinks     Comment: socially    Drug use: No     PHYSICAL EXAM     INITIAL VITALS: BP (!) 154/63   Pulse 66   Temp 98.8 °F (37.1 °C) (Oral)   Resp 16   Ht 5' 9\" (1.753 m)   Wt 185 lb (83.9 kg)   SpO2 96%   BMI 27.32 kg/m²    Physical Exam  Vitals and nursing note reviewed. Constitutional:       Appearance: He is well-developed. Comments: Appears uncomfortable   HENT:      Head: Normocephalic and atraumatic.    Eyes: Conjunctiva/sclera: Conjunctivae normal.   Cardiovascular:      Rate and Rhythm: Normal rate and regular rhythm. Heart sounds: No murmur heard. No friction rub. Pulmonary:      Effort: Pulmonary effort is normal. No respiratory distress. Breath sounds: Normal breath sounds. No wheezing or rhonchi. Abdominal:      General: There is no distension. Palpations: Abdomen is soft. Tenderness: There is no abdominal tenderness. There is no guarding or rebound. Musculoskeletal:      Cervical back: Neck supple. Comments: Tenderness to palpation over the SI joints on the right   Skin:     General: Skin is warm and dry. Capillary Refill: Capillary refill takes less than 2 seconds. Neurological:      Mental Status: He is alert. Comments: No saddle anesthesia, decreased sensation in the left posterior leg when compared to the right, 4 out of 5 muscle strength right lower extremity when compared to the left       DIAGNOSTIC RESULTS   RADIOLOGY:All plain film, CT, MRI, and formal ultrasound images (except ED bedside ultrasound) are read by the radiologist, see reports below, unless otherwisenoted in MDM or here. MRI LUMBAR SPINE WO CONTRAST    (Results Pending)         EMERGENCY DEPARTMENTCOURSE:   Differential diagnosis includes fracture dislocation cord compression sciatica. The patient seems to be doing everything he can to physical therapy chiropractic acupuncture seeing his primary care physician trying to get an outpatient MRI but his pain is just too bad where they have been putting chairs out so he can sit before he can get to the restroom. Because of this we will obtain an MRI.   Patient will be signed out to Dr. Darrius Miranda pending MRI and final disposition       Vitals:    Vitals:    05/11/22 0406   BP: (!) 154/63   Pulse: 66   Resp: 16   Temp: 98.8 °F (37.1 °C)   TempSrc: Oral   SpO2: 96%   Weight: 185 lb (83.9 kg)   Height: 5' 9\" (1.753 m)       The patient was given the following medications while in the emergency department:  Orders Placed This Encounter   Medications    lidocaine 4 % external patch 1 patch    morphine sulfate (PF) injection 4 mg    dexamethasone (PF) (DECADRON) injection 10 mg    orphenadrine (NORFLEX) injection 60 mg         FINAL IMPRESSION      1.  Acute right-sided low back pain with right-sided sciatica         DISPOSITION/PLAN   DISPOSITION  Pending    Myles Lanes, MD  Attending Emergency Physician    This charting supersedes any ED resident or staff charting and was written using speech recognition software        Myles Lanes, MD  05/11/22 2365

## 2022-05-11 NOTE — PROGRESS NOTES
Medication History completed:    New medications: none    Medications discontinued: erythromycin ophthalmic ointment    Changes to dosing: none    Stated allergies: As listed    Other pertinent information: Medications confirmed with [facility] and [facility]. The patient has recent prescriptions for cyclobenzaprine and a prednisone taper which end today.      Thank you,  Sherita Koroma, PharmD, BCPS  [phone]

## 2022-05-11 NOTE — H&P
[facility]  Office: [address], DO, Janette Woodson, DO, Willian Patel, DO, Destin Rolwand Elsie, DO, Angélica Julio MD, Veronica Castillo MD, Erlinda Brady MD, Preethi Meneses MD, Moises Woods MD, Dar Martin MD, Chyna Crow MD, Luis Ambrose, DO, Jassi Bustillo DO, Tobias Mckinnon MD,  Marry Kiser DO, Ruthie Chiang MD, Marie Holcomb MD, Fang العراقي MD, Tad Singh DO, Kika Rogers MD, Joli Angelucci, MD, Bradley Small MD, Haily Gordon, [facility], [facility], [facility], Robin Gonzalez, CNP, Ayla Lee, CNP, Rosetta Howard, CNP, Tariq Hoover, CNP, Linda Murillo PA-C, Ava Seth, DNP, Ines Crum, CNP, Lawrence Canales, CNP, Urmila Souza, CNP, Abisai Jesus, CNS, Sophyolyiman Nails, DNP, Zack Clapper, CNP, Jose Proper, CNP, Bambi Serum, CNP         [address]    HISTORY AND PHYSICAL EXAMINATION            Date:   5/11/2022  Patient name:  Cathy Aj  Date of admission:  5/11/2022  5:04 PM  MRN:   [id number]  Account:  [de-identified]  YOB: 1950  PCP:    TARA Pelletier CNP  Room:   [facility]/0701-  Code Status:    Full Code    Chief Complaint:     Back pain    History Obtained From:     patient    History of Present Illness:     Cathy Aj is a 73 y.o. Non- / non  male who presents with No chief complaint on file. and is admitted to the hospital for the management of Central stenosis of spinal canal.    80-year-old male presents to the hospital with hypertension and chronic back [facility]. Patient has been dealing with sciatica which has been worse in the last 1 month. The patient has been trying acupuncture chiropractic and physical therapy without any improvement of his symptoms. Patient followed up with his PCP and also did steroid course. Patient did not report any improvement.   Patient was scheduled to get MRI on 17th however yesterday woke up with extreme pain. Patient has not been able to move around and ambulate at home due to severe pain. Patient also reports tingling and numbness in the toes of right lower extremity. Patient has known history of urinary retention, follows up with urology, had prostate biopsy which was negative. Patient was also noted to have urinary retention, was already following up with urology outpatient. Patient was seen to have around 700 ml postvoid residual, Matos catheter was placed. Patient is too weak to move around and has difficulty leaving moving from chair. Patient was noted to have normal electrolytes, creatinine 1.07, LFTs normal, hemoglobin 16.1. Platelet count 472. MRI lumbar spine showed multilevel lumbar spondylosis superimposed on developmentally narrow canal due to short pedicles resulting in upwards of moderate canal stenosis at L3-L4, mild to moderate canal stenosis at L4-L5. Markedly distended bladder. Past Medical History:     Past Medical History:   Diagnosis Date    Basal cell carcinoma     Erectile dysfunction     Hearing aid worn     both ears    Hypertension     Daysi Vu CNP    Precancerous skin lesion     Squamous cell carcinoma         Past Surgical History:     Past Surgical History:   Procedure Laterality Date    MALIGNANT SKIN LESION EXCISION      MOHS SURGERY      PRE-MALIGNANT / BENIGN SKIN LESION EXCISION      PROSTATE BIOPSY N/A 8/16/2021    FUSION PROSTATE BIOPSY AND ULTRASOUND performed by Elver Edward MD at [address] [place] [address]          Medications Prior to Admission:     Prior to Admission medications    Medication Sig Start Date End Date Taking?  Authorizing Provider   predniSONE (DELTASONE) 10 MG tablet 3 tabs for 3 days, 2 tabs for 3 days, 1 tab for 3 days  Patient not taking: Reported on 5/11/2022 5/3/22   Forrestine Plaster, APRN - CNP   cyclobenzaprine (FLEXERIL) 10 MG tablet Take 1 tablet by mouth nightly as needed for Muscle spasms 5/1/22 5/11/22  Conley Schwab, MD   tadalafil (CIALIS) 5 MG tablet Take 1 tablet by mouth daily 3/8/22   Vandana Jain MD   lisinopril-hydroCHLOROthiazide [facility]) 20-12.5 MG per tablet Take 1 tablet by mouth daily 1/26/22   TRAA Zuñiga CNP   tamsulosin (FLOMAX) 0.4 MG capsule TAKE 2 CAPSULES DAILY 11/17/21   Carl Ang MD   Flaxseed, Linseed, (FLAXSEED OIL PO) Take 1,000 mLs by mouth    Historical Provider, MD   Red Yeast Rice 600 MG CAPS Take 1,200 mg by mouth 2 times daily 1/18/21   TARA Yanez CNP   Cholecalciferol (VITAMIN D3) 25 MCG (1000 UT) CAPS Take 1,000 Units by mouth daily  6/1/20   Historical Provider, MD   Psyllium (METAMUCIL PO) Take by mouth daily     Historical Provider, MD   Bioflavonoid Products (KATHRYN-C) 500-200-60 MG TABS Take 1 tablet by mouth daily    Historical Provider, MD        Allergies:     Influenza vaccines    Social History:     Tobacco:    reports that he has never smoked. He has never used smokeless tobacco.  Alcohol:      reports previous alcohol use. Drug Use:  reports no history of drug use. Family History:     Family History   Problem Relation Age of Onset    High Blood Pressure Mother     Diabetes Neg Hx        Review of Systems:     Positive and Negative as described in HPI. Review of Systems   Constitutional: Positive for activity change and fatigue. Negative for chills and diaphoresis. HENT: Negative for congestion and dental problem. Respiratory: Negative for cough and shortness of breath. Cardiovascular: Negative for chest pain and leg swelling. Gastrointestinal: Negative for abdominal distention and abdominal pain. Genitourinary: Positive for difficulty urinating. Negative for flank pain and frequency. Musculoskeletal: Positive for back pain and gait problem. Negative for arthralgias. Skin: Negative for color change. Neurological: Positive for numbness.  Negative for dizziness and facial asymmetry. Psychiatric/Behavioral: Negative for agitation and behavioral problems. Physical Exam:   BP (!) 167/86   Pulse 81   Temp 99.4 °F (37.4 °C) (Oral)   Resp 28   SpO2 98%   Temp (24hrs), Av.1 °F (37.3 °C), Min:98.8 °F (37.1 °C), Max:99.4 °F (37.4 °C)    No results for input(s): POCGLU in the last 72 hours. No intake or output data in the 24 hours ending 22    Physical Exam  Constitutional:       Appearance: Normal appearance. HENT:      Head: Normocephalic and atraumatic. Right Ear: External ear normal.      Left Ear: External ear normal.      Nose: Nose normal.      Mouth/Throat:      Mouth: Mucous membranes are moist.   Eyes:      Pupils: Pupils are equal, round, and reactive to light. Cardiovascular:      Rate and Rhythm: Normal rate and regular rhythm. Pulses: Normal pulses. Heart sounds: Normal heart sounds. Pulmonary:      Effort: Pulmonary effort is normal.      Breath sounds: Normal breath sounds. No wheezing. Abdominal:      General: Abdomen is flat. Bowel sounds are normal. There is no distension. Tenderness: There is no abdominal tenderness. Musculoskeletal:         General: No swelling. Right lower leg: No edema. Left lower leg: No edema. Comments: Mobility limited due to pain   Neurological:      Mental Status: He is alert and oriented to person, place, and time. Motor: Weakness present.       Gait: Gait abnormal.      Comments: 3 x 5 strength on right lower extremity, 4 x 5 strength on left lower extremity  Normal upper extremity strength       Psychiatric:         Mood and Affect: Mood normal.         Investigations:      Laboratory Testing:  Recent Results (from the past 24 hour(s))   Urinalysis with Reflex to Culture    Collection Time: 22  8:50 AM    Specimen: Urine   Result Value Ref Range    Color, UA Yellow Yellow    Turbidity UA Clear Clear    Glucose, Ur NEGATIVE NEGATIVE SPINE WO CONTRAST    Result Date: 5/11/2022  1. Multilevel lumbar spondylosis superimposed on a developmentally narrow canal due to short pedicles resulting in upwards of moderate canal stenosis at L3-4, mild to moderate canal stenosis at L4-5. 2. Severe right-sided foraminal stenosis at L4-5. 3. Markedly distended bladder. Correlate with concerns for outlet obstruction. Assessment :      Hospital Problems           Last Modified POA    * (Principal) Central stenosis of spinal canal 5/11/2022 Yes    Spinal stenosis 5/11/2022 Yes    Urinary retention 5/11/2022 Yes    Essential hypertension 5/11/2022 Yes    Cervical spondylolysis 5/11/2022 Yes          Plan:     Patient status inpatient in the Med/Surge    Intractable sciatica pain with multilevel lumbar spondylosis with severe canal stenosis L4-L5- neurosurgery recommended transfer here, add Neurontin 100 mg 3 times daily, Flexeril twice a day, morphine for pain control. Acute on chronic urinary retention-patient has history of BPH, follows up with urology outpatient, Matos catheter was put in for retention of 750 mL urine, will consult urology inpatient. Hypertension-resume home medication    PT OT evaluation  Continue gentle IV hydration  DVT prophylaxis on Lovenox    Consultations:   IP CONSULT TO UROLOGY  IP CONSULT TO NEUROSURGERY     Patient is admitted as inpatient status because of co-morbidities listed above, severity of signs and symptoms as outlined, requirement for current medical therapies and most importantly because of direct risk to patient if care not provided in a hospital setting. Expected length of stay > 48 hours.     Selvin Decker MD  5/11/2022  6:31 PM    Copy sent to Dr. Patito Heard, APRN - CNP

## 2022-05-11 NOTE — ED NOTES
Report given to Nena Gilbert RN from [address] [place] [address]. V's 2C. Report method by phone   The following was reviewed with receiving RN:   Current vital signs:  BP (!) 161/79   Pulse 77   Temp 98.8 °F (37.1 °C) (Oral)   Resp 12   Ht 5' 9\" (1.753 m)   Wt 185 lb (83.9 kg)   SpO2 99%   BMI 27.32 kg/m²                MEWS Score: 1     Any medication or safety alerts were reviewed. Any pending diagnostics and notifications were also reviewed, as well as any safety concerns or issues, abnormal labs, abnormal imaging, and abnormal assessment findings. Questions were answered.             Martin Greenberg  05/11/22 [id number]

## 2022-05-12 PROBLEM — N40.0 BPH (BENIGN PROSTATIC HYPERPLASIA): Status: ACTIVE | Noted: 2022-05-12

## 2022-05-12 LAB
ABSOLUTE EOS #: 0.05 K/UL (ref 0–0.44)
ABSOLUTE IMMATURE GRANULOCYTE: 0.09 K/UL (ref 0–0.3)
ABSOLUTE LYMPH #: 2.33 K/UL (ref 1.1–3.7)
ABSOLUTE MONO #: 0.53 K/UL (ref 0.1–1.2)
ABSOLUTE RETIC #: 0.07 M/UL (ref 0.03–0.08)
ANION GAP SERPL CALCULATED.3IONS-SCNC: 9 MMOL/L (ref 9–17)
BASOPHILS # BLD: 0 % (ref 0–2)
BASOPHILS ABSOLUTE: <0.03 K/UL (ref 0–0.2)
BUN BLDV-MCNC: 18 MG/DL (ref 8–23)
CALCIUM SERPL-MCNC: 8.8 MG/DL (ref 8.6–10.4)
CHLORIDE BLD-SCNC: 102 MMOL/L (ref 98–107)
CO2: 26 MMOL/L (ref 20–31)
CREAT SERPL-MCNC: 0.86 MG/DL (ref 0.7–1.2)
CULTURE: NO GROWTH
CULTURE: NO GROWTH
EOSINOPHILS RELATIVE PERCENT: 1 % (ref 1–4)
GFR AFRICAN AMERICAN: >60 ML/MIN
GFR NON-AFRICAN AMERICAN: >60 ML/MIN
GFR SERPL CREATININE-BSD FRML MDRD: ABNORMAL ML/MIN/{1.73_M2}
GLUCOSE BLD-MCNC: 115 MG/DL (ref 70–99)
HCT VFR BLD CALC: 43.6 % (ref 40.7–50.3)
HCT VFR BLD CALC: 46.4 % (ref 40.7–50.3)
HEMOGLOBIN: 14.8 G/DL (ref 13–17)
HEMOGLOBIN: 15.8 G/DL (ref 13–17)
IMMATURE GRANULOCYTES: 1 %
IMMATURE RETIC FRACT: 6.9 % (ref 2.7–18.3)
INR BLD: 1
LYMPHOCYTES # BLD: 25 % (ref 24–43)
MCH RBC QN AUTO: 28.8 PG (ref 25.2–33.5)
MCHC RBC AUTO-ENTMCNC: 34.1 G/DL (ref 28.4–34.8)
MCV RBC AUTO: 84.7 FL (ref 82.6–102.9)
MONOCYTES # BLD: 6 % (ref 3–12)
NRBC AUTOMATED: 0 PER 100 WBC
PDW BLD-RTO: 13.9 % (ref 11.8–14.4)
PLATELET # BLD: ABNORMAL K/UL (ref 138–453)
PLATELET, FLUORESCENCE: 131 K/UL (ref 138–453)
PLATELET, IMMATURE FRACTION: 3.3 % (ref 1.1–10.3)
POTASSIUM SERPL-SCNC: 3.7 MMOL/L (ref 3.7–5.3)
PROTHROMBIN TIME: 10.5 SEC (ref 9.1–12.3)
RBC # BLD: 5.48 M/UL (ref 4.21–5.77)
RETIC %: 1.3 % (ref 0.5–1.9)
RETIC HEMOGLOBIN: 35.9 PG (ref 28.2–35.7)
SEG NEUTROPHILS: 68 % (ref 36–65)
SEGMENTED NEUTROPHILS ABSOLUTE COUNT: 6.23 K/UL (ref 1.5–8.1)
SODIUM BLD-SCNC: 137 MMOL/L (ref 135–144)
SPECIMEN DESCRIPTION: NORMAL
SPECIMEN DESCRIPTION: NORMAL
WBC # BLD: 9.2 K/UL (ref 3.5–11.3)

## 2022-05-12 PROCEDURE — 85025 COMPLETE CBC W/AUTO DIFF WBC: CPT

## 2022-05-12 PROCEDURE — 85014 HEMATOCRIT: CPT

## 2022-05-12 PROCEDURE — 85045 AUTOMATED RETICULOCYTE COUNT: CPT

## 2022-05-12 PROCEDURE — 6360000002 HC RX W HCPCS: Performed by: NURSE PRACTITIONER

## 2022-05-12 PROCEDURE — 6370000000 HC RX 637 (ALT 250 FOR IP): Performed by: NURSE PRACTITIONER

## 2022-05-12 PROCEDURE — 85610 PROTHROMBIN TIME: CPT

## 2022-05-12 PROCEDURE — 6360000002 HC RX W HCPCS: Performed by: STUDENT IN AN ORGANIZED HEALTH CARE EDUCATION/TRAINING PROGRAM

## 2022-05-12 PROCEDURE — 85018 HEMOGLOBIN: CPT

## 2022-05-12 PROCEDURE — 1200000000 HC SEMI PRIVATE

## 2022-05-12 PROCEDURE — 80048 BASIC METABOLIC PNL TOTAL CA: CPT

## 2022-05-12 PROCEDURE — 85055 RETICULATED PLATELET ASSAY: CPT

## 2022-05-12 PROCEDURE — 2580000003 HC RX 258: Performed by: STUDENT IN AN ORGANIZED HEALTH CARE EDUCATION/TRAINING PROGRAM

## 2022-05-12 PROCEDURE — 6370000000 HC RX 637 (ALT 250 FOR IP): Performed by: STUDENT IN AN ORGANIZED HEALTH CARE EDUCATION/TRAINING PROGRAM

## 2022-05-12 PROCEDURE — 99232 SBSQ HOSP IP/OBS MODERATE 35: CPT | Performed by: INTERNAL MEDICINE

## 2022-05-12 PROCEDURE — 36415 COLL VENOUS BLD VENIPUNCTURE: CPT

## 2022-05-12 RX ORDER — ACETAMINOPHEN 500 MG
1000 TABLET ORAL EVERY 8 HOURS
Status: DISCONTINUED | OUTPATIENT
Start: 2022-05-12 | End: 2022-05-15 | Stop reason: HOSPADM

## 2022-05-12 RX ORDER — UREA 10 %
3 LOTION (ML) TOPICAL NIGHTLY PRN
Status: DISCONTINUED | OUTPATIENT
Start: 2022-05-12 | End: 2022-05-15 | Stop reason: HOSPADM

## 2022-05-12 RX ORDER — METHOCARBAMOL 750 MG/1
750 TABLET, FILM COATED ORAL 4 TIMES DAILY
Status: DISCONTINUED | OUTPATIENT
Start: 2022-05-12 | End: 2022-05-15 | Stop reason: HOSPADM

## 2022-05-12 RX ADMIN — HYDROMORPHONE HYDROCHLORIDE 0.5 MG: 1 INJECTION, SOLUTION INTRAMUSCULAR; INTRAVENOUS; SUBCUTANEOUS at 10:38

## 2022-05-12 RX ADMIN — HYDROMORPHONE HYDROCHLORIDE 0.5 MG: 1 INJECTION, SOLUTION INTRAMUSCULAR; INTRAVENOUS; SUBCUTANEOUS at 13:36

## 2022-05-12 RX ADMIN — GABAPENTIN 100 MG: 300 CAPSULE ORAL at 09:40

## 2022-05-12 RX ADMIN — HYDROMORPHONE HYDROCHLORIDE 0.5 MG: 1 INJECTION, SOLUTION INTRAMUSCULAR; INTRAVENOUS; SUBCUTANEOUS at 16:42

## 2022-05-12 RX ADMIN — TAMSULOSIN HYDROCHLORIDE 0.8 MG: 0.4 CAPSULE ORAL at 07:59

## 2022-05-12 RX ADMIN — HYDROMORPHONE HYDROCHLORIDE 0.5 MG: 1 INJECTION, SOLUTION INTRAMUSCULAR; INTRAVENOUS; SUBCUTANEOUS at 01:47

## 2022-05-12 RX ADMIN — Medication 3 MG: at 21:39

## 2022-05-12 RX ADMIN — GABAPENTIN 100 MG: 300 CAPSULE ORAL at 21:38

## 2022-05-12 RX ADMIN — HYDROMORPHONE HYDROCHLORIDE 0.5 MG: 1 INJECTION, SOLUTION INTRAMUSCULAR; INTRAVENOUS; SUBCUTANEOUS at 07:41

## 2022-05-12 RX ADMIN — HYDROMORPHONE HYDROCHLORIDE 0.5 MG: 1 INJECTION, SOLUTION INTRAMUSCULAR; INTRAVENOUS; SUBCUTANEOUS at 05:23

## 2022-05-12 RX ADMIN — SODIUM CHLORIDE, PRESERVATIVE FREE 10 ML: 5 INJECTION INTRAVENOUS at 23:04

## 2022-05-12 RX ADMIN — METHOCARBAMOL TABLETS 750 MG: 750 TABLET, COATED ORAL at 16:43

## 2022-05-12 RX ADMIN — CYCLOBENZAPRINE 10 MG: 10 TABLET, FILM COATED ORAL at 09:39

## 2022-05-12 RX ADMIN — HYDROMORPHONE HYDROCHLORIDE 0.5 MG: 1 INJECTION, SOLUTION INTRAMUSCULAR; INTRAVENOUS; SUBCUTANEOUS at 19:56

## 2022-05-12 RX ADMIN — GABAPENTIN 100 MG: 300 CAPSULE ORAL at 16:19

## 2022-05-12 RX ADMIN — CYCLOBENZAPRINE 10 MG: 10 TABLET, FILM COATED ORAL at 15:30

## 2022-05-12 RX ADMIN — ACETAMINOPHEN 1000 MG: 500 TABLET ORAL at 16:42

## 2022-05-12 RX ADMIN — ENOXAPARIN SODIUM 40 MG: 100 INJECTION SUBCUTANEOUS at 09:40

## 2022-05-12 RX ADMIN — LISINOPRIL AND HYDROCHLOROTHIAZIDE 1 TABLET: 12.5; 2 TABLET ORAL at 07:41

## 2022-05-12 RX ADMIN — METHOCARBAMOL TABLETS 750 MG: 750 TABLET, COATED ORAL at 21:39

## 2022-05-12 ASSESSMENT — PAIN DESCRIPTION - LOCATION
LOCATION: BACK
LOCATION: LEG;BACK

## 2022-05-12 ASSESSMENT — PAIN SCALES - GENERAL
PAINLEVEL_OUTOF10: 9
PAINLEVEL_OUTOF10: 9
PAINLEVEL_OUTOF10: 10
PAINLEVEL_OUTOF10: 8
PAINLEVEL_OUTOF10: 10
PAINLEVEL_OUTOF10: 10
PAINLEVEL_OUTOF10: 8
PAINLEVEL_OUTOF10: 9
PAINLEVEL_OUTOF10: 10

## 2022-05-12 ASSESSMENT — PAIN DESCRIPTION - ORIENTATION: ORIENTATION: RIGHT

## 2022-05-12 ASSESSMENT — PAIN DESCRIPTION - DESCRIPTORS: DESCRIPTORS: ACHING

## 2022-05-12 NOTE — PROGRESS NOTES
[facility]  Office: [address], DO, Reina Prompton, DO, Carlos Desai, DO, Eliza Cannelton Blood, DO, Lenin Rivas MD, Patricia Haynes MD, Jonh Lee MD, Farrukh Crowe MD, Kirstin Beard MD, Corona Johnson MD, Lalo Boone MD, Merari Murray, DO, Leif Caputo, DO, Jasmine Guallpa MD,  Saintclair Lank, DO, Shani Rich MD, Froy Ghosh MD, Evita Winchester MD, Roscoe Law, DO, Alfreda Chauhan MD, Jemima Gallagher MD, Turner Kay MD, Daryle Springer, [facility], [facility], CNP, Bridget Cobos, CNP, Lloyd Aguilera, CNP, Lucia Sanders, CNP, Ivan Hill, CNP, NEGRITA TalaveraC, Eros Henderson, [facility], Andrew Dahl, CNP, Zina Young, CNP, Mica Vidales, CNP, Magnus Zapien, CNS, Ryan Padilla, [facility], Phill Carlos, CNP, Aime Kaur, CNP, Jose Rose, CNP         [address] [place] [address]    Progress Note    Name:   Nahed Atkins  MRN:     [id number]     Acct:      [de-identified]   Room:   6906/3713-01   Day:  1  Admit Date:  5/11/2022  5:04 PM    PCP:   TARA Siegel CNP  Code Status:  Full Code    Subjective:     C/C: back pain  Interval History Status: unchanged. Patient continues to complain of intermittent pain but worse in his right knee pain that is sharp shooting 10/10 severe unable to identify any relieving factors. Noticed hematuria within the Matos that he noticed while having a bowel movement this afternoon. Is afebrile hemodynamically stable. CBC BMP unremarkable. Brief History:   56-year-old with prior history of hypertension, chronic back pain and BPH with negative prostate biopsy  presenting to [address] [place] [address] ED with complaints of acute worsening of the back pain along with pain in the right lower extremity. He was found to have acute urinary retention requiring Matos placement.   Right lumbar spine showed multilevel DJD with L3-L4 moderate canal stenosis, severe L4-L5 right-sided foraminal stenosis. Review of Systems:     Constitutional:  negative for chills, fevers, sweats  Respiratory:  negative for cough, dyspnea on exertion, shortness of breath, wheezing  Cardiovascular:  negative for chest pain, chest pressure/discomfort, lower extremity edema, palpitations  Gastrointestinal:  negative for abdominal pain, constipation, diarrhea, nausea, vomiting  Neurological:  negative for dizziness, headache    Medications: Allergies: Allergies   Allergen Reactions    Influenza Vaccines Swelling       Current Meds:   Scheduled Meds:    tadalafil  5 mg Oral Daily    tamsulosin  0.8 mg Oral Daily    sodium chloride flush  5-40 mL IntraVENous 2 times per day    enoxaparin  40 mg SubCUTAneous Daily    lisinopril-hydroCHLOROthiazide  1 tablet Oral Daily    cyclobenzaprine  10 mg Oral BID    gabapentin  100 mg Oral TID     Continuous Infusions:    sodium chloride      sodium chloride 100 mL/hr at 22 0659     PRN Meds: HYDROmorphone **OR** HYDROmorphone, sodium chloride flush, sodium chloride, potassium chloride **OR** potassium alternative oral replacement **OR** potassium chloride, magnesium sulfate, ondansetron **OR** ondansetron, polyethylene glycol, acetaminophen **OR** acetaminophen, ketorolac, morphine **OR** morphine    Data:     Past Medical History:   has a past medical history of Basal cell carcinoma, Erectile dysfunction, Hearing aid worn, Hypertension, Precancerous skin lesion, and Squamous cell carcinoma. Social History:   reports that he has never smoked. He has never used smokeless tobacco. He reports previous alcohol use. He reports that he does not use drugs.      Family History:   Family History   Problem Relation Age of Onset    High Blood Pressure Mother     Diabetes Neg Hx        Vitals:  BP (!) 148/82   Pulse 86   Temp 98.3 °F (36.8 °C) (Oral)   Resp 17   SpO2 98%   Temp (24hrs), Av.7 °F (37.1 °C), Min:98.3 °F (36.8 °C), Max:99.4 °F (37.4 °C)    No results for input(s): POCGLU in the last 72 hours. I/O (24Hr): Intake/Output Summary (Last 24 hours) at 5/12/2022 0827  Last data filed at 5/12/2022 0659  Gross per 24 hour   Intake 1230.26 ml   Output 400 ml   Net 830.26 ml       Labs:  Hematology:  Recent Labs     05/11/22  0931   WBC 5.8   RBC 5.60   HGB 16.1   HCT 46.8   MCV 83.7   MCH 28.7   MCHC 34.3   RDW 15.0*   *   MPV 8.8     Chemistry:  Recent Labs     05/11/22  0931 05/12/22  0651    137   K 4.0 3.7    102   CO2 26 26   GLUCOSE 111* 115*   BUN 21 18   CREATININE 1.07 0.86   ANIONGAP 12 9   LABGLOM >60 >60   GFRAA >60 >60   CALCIUM 9.6 8.8     Recent Labs     05/11/22  0931   PROT 6.9   LABALBU 4.4   AST 20   ALT 31   ALKPHOS 51   BILITOT 0.44     Radiology:  MRI LUMBAR SPINE WO CONTRAST    Result Date: 5/11/2022  1. Multilevel lumbar spondylosis superimposed on a developmentally narrow canal due to short pedicles resulting in upwards of moderate canal stenosis at L3-4, mild to moderate canal stenosis at L4-5. 2. Severe right-sided foraminal stenosis at L4-5. 3. Markedly distended bladder. Correlate with concerns for outlet obstruction. Physical Examination:        General appearance:  alert, cooperative and no distress  Mental Status:  oriented to person, place and time and normal affect  Lungs:  clear to auscultation bilaterally, normal effort  Heart:  regular rate and rhythm, no murmur  Abdomen:  soft, nontender, nondistended, normal bowel sounds  Extremities: All extremities equally . denies any bowel incontinence . no edema, redness, tenderness in the calves  Skin:  no gross lesions, rashes, induration    Assessment:        Hospital Problems           Last Modified POA    * (Principal) Central stenosis of spinal canal 5/11/2022 Yes    Spinal stenosis 5/11/2022 Yes    Urinary retention 5/11/2022 Yes    Intractable pain 5/11/2022 Yes    BPH (benign prostatic hyperplasia) 5/12/2022 Yes    Essential hypertension 5/11/2022 Yes Cervical spondylolysis 5/11/2022 Yes        Plan:      -Right lower extremity radicular pain with L4-L5 foraminal stenosis: Await neurosurgery input regarding timing of surgical intervention.  -New hematuria with acute urinary retention: Continue to monitor likely from Matos trauma. If worsens will hold off on Lovenox but for now continue Lovenox for DVT prophylaxis    Urology following. Matos catheter, Flomax.  -continue current pain management regimen.  -Continue home dose of Neurontin + hydrochlorothiazide and Cialis.     Moises Woods MD  5/12/2022

## 2022-05-12 NOTE — PROGRESS NOTES
Physical Therapy        Physical Therapy Cancel Note      DATE: 2022    NAME: Lisa Davey  MRN: [id number]   : 1950      Patient not seen this date for Physical Therapy due to: Other:  To see pending Neurosurgery consult and POC      Electronically signed by Christiane Haas PT on 2022 at 7:39 AM

## 2022-05-12 NOTE — CONSULTS
Urology Consultation    Patient:  Vickie Graham  MRN: [id number]  YOB: 1950    CHIEF COMPLAINT: Urinary retention    HISTORY OF PRESENT ILLNESS:   The patient is a 73 y.o. male who presents with past medical history of BPH requiring Flomax. Patient also has a past medical history of elevated PSA MRI PI-RADS 4 with a negative biopsy and monitored by Dr. Hill. Patient was admitted due to acute back pain lower extremity pain numbness and tingling. Patient was also found to have urinary retention at around 700 for which a Matos catheter was placed prior to transfer. Catheter is currently in place clear yellow. Creatinine normal, hemoglobin okay and WBC okay. MRI lumbar spine showing multilevel lumbar spondylosis with a narrow canal and moderate canal stenosis. Evaluation per primary and neurology. Patient's old records, notes and chart reviewed and summarized above.     Past Medical History:    Past Medical History:   Diagnosis Date    Basal cell carcinoma     Erectile dysfunction     Hearing aid worn     both ears    Hypertension     [place] Raisa, CNP    Precancerous skin lesion     Squamous cell carcinoma        Past Surgical History:    Past Surgical History:   Procedure Laterality Date    MALIGNANT SKIN LESION EXCISION      MOHS SURGERY      PRE-MALIGNANT / BENIGN SKIN LESION EXCISION      PROSTATE BIOPSY N/A 8/16/2021    FUSION PROSTATE BIOPSY AND ULTRASOUND performed by Latoya Almazan MD at [facility]       Previous  surgery: As noted in HPI     Medications:    Scheduled Meds:   tadalafil  5 mg Oral Daily    tamsulosin  0.8 mg Oral Daily    sodium chloride flush  5-40 mL IntraVENous 2 times per day    enoxaparin  40 mg SubCUTAneous Daily    lisinopril-hydroCHLOROthiazide  1 tablet Oral Daily    cyclobenzaprine  10 mg Oral BID    gabapentin  100 mg Oral TID     Continuous Infusions:   sodium chloride      sodium chloride 100 mL/hr at 05/12/22 0659     PRN Meds:. HYDROmorphone **OR** HYDROmorphone, sodium chloride flush, sodium chloride, potassium chloride **OR** potassium alternative oral replacement **OR** potassium chloride, magnesium sulfate, ondansetron **OR** ondansetron, polyethylene glycol, acetaminophen **OR** acetaminophen, ketorolac, morphine **OR** morphine    Allergies:  Influenza vaccines    Social History:    Social History     Socioeconomic History    Marital status:      Spouse name: Not on file    Number of children: Not on file    Years of education: Not on file    Highest education level: Not on file   Occupational History    Not on file   Tobacco Use    Smoking status: Never Smoker    Smokeless tobacco: Never Used   Substance and Sexual Activity    Alcohol use: Not Currently     Alcohol/week: 0.0 standard drinks     Comment: socially    Drug use: No    Sexual activity: Yes     Partners: Female   Other Topics Concern    Not on file   Social History Narrative    Not on file     Social Determinants of Health     Financial Resource Strain: Low Risk     Difficulty of Paying Living Expenses: Not hard at all   Food Insecurity: No Food Insecurity    Worried About Running Out of Food in the Last Year: Never true    [place] of Food in the Last Year: Never true   Transportation Needs:     Lack of Transportation (Medical): Not on file    Lack of Transportation (Non-Medical):  Not on file   Physical Activity:     Days of Exercise per Week: Not on file    Minutes of Exercise per Session: Not on file   Stress:     Feeling of Stress : Not on file   Social Connections:     Frequency of Communication with Friends and Family: Not on file    Frequency of Social Gatherings with Friends and Family: Not on file    Attends Mormonism Services: Not on file    Active Member of Clubs or Organizations: Not on file    Attends Club or Organization Meetings: Not on file    Marital Status: Not on file   Intimate Partner Violence:     Fear of Current or Ex-Partner: Not on file    Emotionally Abused: Not on file    Physically Abused: Not on file    Sexually Abused: Not on file   Housing Stability:     Unable to Pay for Housing in the Last Year: Not on file    Number of Places Lived in the Last Year: Not on file    Unstable Housing in the Last Year: Not on file       Family History:    Family History   Problem Relation Age of Onset    High Blood Pressure Mother     Diabetes Neg Hx      Previous Urologic Family history: none    REVIEW OF SYSTEMS:  Point review of systems negative except for noted in HPI    Physical Exam:    This a 73 y.o. male   Patient Vitals for the past 24 hrs:   BP Temp Temp src Pulse Resp SpO2   05/11/22 1933 (!) 168/86 98.3 °F (36.8 °C) Oral 88 19 95 %   05/11/22 1746 (!) 167/86 99.4 °F (37.4 °C) Oral 81 28 98 %     Constitutional: Patient in no acute distress; Neuro: alert and oriented to person place and time. Psych: Mood and affect normal.  Skin: Normal  Lungs: Respiratory effort normal  Cardiovascular:  Normal peripheral pulses  Abdomen: Soft, non-tender, non-distended with no CVA, flank pain, hepatosplenomegaly or hernia. Kidneys normal.  Bladder non-tender and not distended.   Lymphatics: no palpable lymphadenopathy  Penis normal and circumcised fully catheter in place  Urethral meatus normal  Scrotal exam normal  Testicles normal bilaterally  Extremities: Back and lower extremity pain    LABS:  Recent Labs     05/11/22  0931   WBC 5.8   HGB 16.1   HCT 46.8   MCV 83.7   *     Recent Labs     05/11/22  0931      K 4.0      CO2 26   BUN 21   CREATININE 1.07     Lab Results   Component Value Date    PSA 8.50 (H) 02/28/2022    PSA 8.51 (H) 05/18/2021    PSA 7.76 (H) 11/17/2020       Additional Lab/culture results:    Urinalysis:   Recent Labs     05/11/22  0850   COLORU Yellow   PHUR 6.0   SPECGRAV 1.016   LEUKOCYTESUR NEGATIVE   UROBILINOGEN Normal   BILIRUBINUR NEGATIVE -----------------------------------------------------------------  Imaging Results: As noted in HPI    Assessment and Plan   Impression:    71-year-old male  Elevated PSA with negative prostate biopsy  Urinary retention  Patient Active Problem List   Diagnosis    Essential hypertension    Cervical spondylolysis    Neck pain    Coagulation defect ([address]    Spinal stenosis    Central stenosis of spinal canal    Urinary retention    Intractable pain       Plan:   No acute intervention from urology standpoint, urinary retention exacerbated by recent neurologic symptomatology which is currently being worked up. Would recommend void trial closer to discharge when patient is adequately treated for these neurologic symptoms. Patient has a history of BPH but was able to void and empty bladder okay prior to this event. Would continue Flomax at this time  Outpatient follow-up from urology standpoint  Urology will sign off at this time, call for void trial day before discharge    Familia Castillo MD  7:22 AM 5/12/2022    Agree. Void trial prior to discharge. I have discussed the care of this patient including pertinent history and exam findings, with the resident, PA, and or NP. I have personally seen and examined the patient, including the key elements of all parts of the encounter, which been performed by me. I agree with the assessment, plan and orders as documented by the resident, PA, and or NP (GC modifier). Please don't hesitate to call with any questions. Thank you for involving us in the care of this pleasant patient.       Louis Robles MD

## 2022-05-12 NOTE — PLAN OF CARE
Problem: Discharge Planning  Goal: Discharge to home or other facility with appropriate resources  Outcome: Progressing     Problem: Safety - Adult  Goal: Free from fall injury  Outcome: Progressing     Problem: Pain  Goal: Verbalizes/displays adequate comfort level or baseline comfort level  Outcome: Progressing

## 2022-05-12 NOTE — PROGRESS NOTES
[facility])  Occupational Therapy Not Seen Note    DATE: 2022    NAME: Vinod Scruggs  MRN: [id number]   : 1950      Patient not seen this date for Occupational Therapy due to:     Other: OT will await neurosurgery consult and POC prior to evaluation    Next Scheduled Treatment: PM or 2022    Electronically signed by ZARI Nye on 2022 at 7:14 AM

## 2022-05-12 NOTE — CONSULTS
Department of Neurosurgery                                       Resident Consult Note      Reason for Consult:  Right leg pain  Requesting Physician:  Dr. Carolyn Gonzalez:   [x]Dr. Leonardo Rich  []Dr. Feliz Madison  []Dr. Morales Shoulder     History Obtained From:  patient    CHIEF COMPLAINT:         Right leg pain    HISTORY OF PRESENT ILLNESS:       The patient is a 73 y.o. male who presents with worsening right leg pain for the past couple of months. Patient reports that the pain starts from his groin/hip and radiates order to the bottom of his toes. Patient denies any numbness or tingling. Patient did state that the pain is exacerbated with laying flat. He did report he has to take pain medications, has received injections without any relief of symptoms. Patient reports that even physical therapy has not helped with him symptoms. Because of worsening symptoms, patient did present to [facility], MRI scan was done which did show severe lumbar spinal stenosis. Given worsening symptoms and patient having difficulty in ambulating, with a postvoid residual of 700 ml (of note patient does have chronic BPH and his MRI did demonstrate markedly distended bladder due to outlet obstruction) he was then transferred to [facility] for neurosurgery evaluation. Prior to transfer, Matos was placed.       PAST MEDICAL HISTORY :       Past Medical History:        Diagnosis Date    Basal cell carcinoma     Erectile dysfunction     Hearing aid worn     both ears    Hypertension     Nacho Vu CNP    Precancerous skin lesion     Squamous cell carcinoma        Past Surgical History:        Procedure Laterality Date    MALIGNANT SKIN LESION EXCISION      MOHS SURGERY      PRE-MALIGNANT / BENIGN SKIN LESION EXCISION      PROSTATE BIOPSY N/A 8/16/2021    FUSION PROSTATE BIOPSY AND ULTRASOUND performed by Doug Shahid MD at [address] [place] [address] History:   Social History     Socioeconomic History    Marital status:      Spouse name: Not on file    Number of children: Not on file    Years of education: Not on file    Highest education level: Not on file   Occupational History    Not on file   Tobacco Use    Smoking status: Never Smoker    Smokeless tobacco: Never Used   Substance and Sexual Activity    Alcohol use: Not Currently     Alcohol/week: 0.0 standard drinks     Comment: socially    Drug use: No    Sexual activity: Yes     Partners: Female   Other Topics Concern    Not on file   Social History Narrative    Not on file     Social Determinants of Health     Financial Resource Strain: Low Risk     Difficulty of Paying Living Expenses: Not hard at all   Food Insecurity: No Food Insecurity    Worried About [address] in the Last Year: Never true    [address] in the Last Year: Never true   Transportation Needs:     Lack of Transportation (Medical): Not on file    Lack of Transportation (Non-Medical):  Not on file   Physical Activity:     Days of Exercise per Week: Not on file    Minutes of Exercise per Session: Not on file   Stress:     Feeling of Stress : Not on file   Social Connections:     Frequency of Communication with Friends and Family: Not on file    Frequency of Social Gatherings with Friends and Family: Not on file    Attends Synagogue Services: Not on file    Active Member of [address] [place] [address] [employer] or Organizations: Not on file    Attends Club or Organization Meetings: Not on file    Marital Status: Not on file   Intimate Partner Violence:     Fear of Current or Ex-Partner: Not on file    Emotionally Abused: Not on file    Physically Abused: Not on file    Sexually Abused: Not on file   Housing Stability:     Unable to Pay for Housing in the Last Year: Not on file    Number of [place] in the Last Year: Not on file    Unstable Housing in the Last Year: Not on file       Family History:       Problem Relation Age of Onset    High Blood Pressure Mother     Diabetes Neg Hx        Allergies:  Influenza vaccines    Home Medications:  Prior to Admission medications    Medication Sig Start Date End Date Taking?  Authorizing Provider   predniSONE (DELTASONE) 10 MG tablet 3 tabs for 3 days, 2 tabs for 3 days, 1 tab for 3 days  Patient not taking: Reported on 5/11/2022 5/3/22   TARA Mulligan CNP   cyclobenzaprine (FLEXERIL) 10 MG tablet Take 1 tablet by mouth nightly as needed for Muscle spasms 5/1/22 5/11/22  Veronica Pastrana MD   tadalafil (CIALIS) 5 MG tablet Take 1 tablet by mouth daily 3/8/22   Maura Reddy MD   lisinopril-hydroCHLOROthiazide (PRINZIDE;ZESTORETIC) 20-12.5 MG per tablet Take 1 tablet by mouth daily 1/26/22   TARA Mulligan CNP   tamsulosin (FLOMAX) 0.4 MG capsule TAKE 2 CAPSULES DAILY 11/17/21   Carl Ang MD   Flaxseed, Linseed, (FLAXSEED OIL PO) Take 1,000 mLs by mouth    Historical Provider, MD   Red Yeast Rice 600 MG CAPS Take 1,200 mg by mouth 2 times daily 1/18/21   TARA Lock CNP   Cholecalciferol (VITAMIN D3) 25 MCG (1000 UT) CAPS Take 1,000 Units by mouth daily  6/1/20   Historical Provider, MD   Psyllium (METAMUCIL PO) Take by mouth daily     Historical Provider, MD   Bioflavonoid Products (KATHRYN-C) 500-200-60 MG TABS Take 1 tablet by mouth daily    Historical Provider, MD       Current Medications:   Current Facility-Administered Medications: tadalafil (CIALIS) tablet 5 mg, 5 mg, Oral, Daily  tamsulosin (FLOMAX) capsule 0.8 mg, 0.8 mg, Oral, Daily  sodium chloride flush 0.9 % injection 5-40 mL, 5-40 mL, IntraVENous, 2 times per day  sodium chloride flush 0.9 % injection 10 mL, 10 mL, IntraVENous, PRN  0.9 % sodium chloride infusion, , IntraVENous, PRN  potassium chloride (KLOR-CON M) extended release tablet 40 mEq, 40 mEq, Oral, PRN **OR** potassium bicarb-citric acid (EFFER-K) effervescent tablet 40 mEq, 40 mEq, Oral, PRN **OR** potassium chloride 10 mEq/100 mL IVPB (Peripheral Line), 10 mEq, IntraVENous, PRN  magnesium sulfate 1000 mg in dextrose 5% 100 mL IVPB, 1,000 mg, IntraVENous, PRN  enoxaparin (LOVENOX) injection 40 mg, 40 mg, SubCUTAneous, Daily  ondansetron (ZOFRAN-ODT) disintegrating tablet 4 mg, 4 mg, Oral, Q8H PRN **OR** ondansetron (ZOFRAN) injection 4 mg, 4 mg, IntraVENous, Q6H PRN  polyethylene glycol (GLYCOLAX) packet 17 g, 17 g, Oral, Daily PRN  acetaminophen (TYLENOL) tablet 650 mg, 650 mg, Oral, Q6H PRN **OR** acetaminophen (TYLENOL) suppository 650 mg, 650 mg, Rectal, Q6H PRN  0.9 % sodium chloride infusion, , IntraVENous, Continuous  ketorolac (TORADOL) injection 15 mg, 15 mg, IntraVENous, Q6H PRN  lisinopril-hydroCHLOROthiazide (PRINZIDE;ZESTORETIC) 20-12.5 MG per tablet 1 tablet, 1 tablet, Oral, Daily  cyclobenzaprine (FLEXERIL) tablet 10 mg, 10 mg, Oral, BID  gabapentin (NEURONTIN) capsule 100 mg, 100 mg, Oral, TID  morphine (PF) injection 2 mg, 2 mg, IntraVENous, Q2H PRN **OR** morphine injection 4 mg, 4 mg, IntraVENous, Q2H PRN    REVIEW OF SYSTEMS:       CONSTITUTIONAL: negative for fatigue and malaise   EYES: negative for double vision and photophobia    HEENT: negative for tinnitus and sore throat   RESPIRATORY: negative for cough, shortness of breath   CARDIOVASCULAR: negative for chest pain, palpitations   GASTROINTESTINAL: negative for nausea, vomiting   GENITOURINARY: negative for incontinence   MUSCULOSKELETAL: negative for neck or back pain, positive for right leg pain   NEUROLOGICAL: negative for seizures   PSYCHIATRIC: negative for agitated     Review of systems otherwise negative. PHYSICAL EXAM:       BP (!) 168/86   Pulse 88   Temp 98.3 °F (36.8 °C) (Oral)   Resp 19   SpO2 95%       CONSTITUTIONAL:  Well developed, well nourished, alert and oriented x 3, in no acute distress. GCS 15, nontoxic. No dysarthria, no aphasia. EOMI.     HEAD:  normocephalic, atraumatic    EYES:  PERRLA, EOMI.   ENT:  moist mucous membranes   NECK:  supple, symmetric, no midline tenderness to palpation    BACK:  without midline tenderness, step-offs or deformities    LUNGS:  Equal air entry bilaterally   CARDIOVASCULAR:  normal s1 / s2   ABDOMEN:  Soft, no rigidity   NEUROLOGIC:  EYE OPENING     Spontaneous - 4 []       To voice - 3 []       To pain - 2 []       None - 1 []    VERBAL RESPONSE     Appropriate, oriented - 5 []       Dazed or confused - 4 []       Syllables, expletives - 3 []       Grunts - 2 []       None - 1 []    MOTOR RESPONSE     Spontaneous, command - 6 []       Localizes pain - 5 []       Withdraws pain - 4 []       Abnormal flexion - 3 []       Abnormal extension - 2 []       None - 1 []            Total GCS: 15    Mental Status:  A & O x3, awake             Cranial Nerves:    cranial nerves II-XII are grossly intact    Motor Exam:    Drift:  absent  Tone:  normal    Motor exam is symmetrical 5 out of 5 all extremities bilaterally    Sensory:    Touch:    Right Upper Extremity:  normal  Left Upper Extremity:  normal  Right Lower Extremity:  normal  Left Lower Extremity:  normal    Deep Tendon Reflexes:    Right Bicep:  1+  Left Bicep:  1+  Right Knee:  1+  Left Knee:  1+    Plantar Response:    Right:  downgoing  Left:  downgoing    Clonus:  absent  Messer's:  absent    Coordination/Dysmetria:  Heel to Shin:  Right:  normal  Finger to Nose:   Right:  normal   Dysdiadochokinesia:  N/A    Gait: Unable to assess   SKIN:  no rash      LABS AND IMAGING:     CBC with Differential:    Lab Results   Component Value Date    WBC 5.8 05/11/2022    RBC 5.60 05/11/2022    HGB 16.1 05/11/2022    HCT 46.8 05/11/2022     05/11/2022    MCV 83.7 05/11/2022    MCH 28.7 05/11/2022    MCHC 34.3 05/11/2022    RDW 15.0 05/11/2022    LYMPHOPCT 36 05/11/2022    MONOPCT 9 05/11/2022    BASOPCT 0 05/11/2022    MONOSABS 0.52 05/11/2022    LYMPHSABS 2.09 05/11/2022    EOSABS 0.17 05/11/2022    BASOSABS 0.00 05/11/2022    DIFFTYPE NOT REPORTED 01/08/2021     BMP:    Lab Results   Component Value Date     05/11/2022    K 4.0 05/11/2022     05/11/2022    CO2 26 05/11/2022    BUN 21 05/11/2022    LABALBU 4.4 05/11/2022    CREATININE 1.07 05/11/2022    CALCIUM 9.6 05/11/2022    GFRAA >60 05/11/2022    LABGLOM >60 05/11/2022    GLUCOSE 111 05/11/2022       Radiology Review:      MRI Lumbar    Impression   1. Multilevel lumbar spondylosis superimposed on a developmentally narrow   canal due to short pedicles resulting in upwards of moderate canal stenosis   at L3-4, mild to moderate canal stenosis at L4-5.   2. Severe right-sided foraminal stenosis at L4-5.   3. Markedly distended bladder.  Correlate with concerns for outlet   obstruction. ASSESSMENT AND PLAN:       Patient Active Problem List   Diagnosis    Essential hypertension    Cervical spondylolysis    Neck pain    Coagulation defect ([address]    Spinal stenosis    Central stenosis of spinal canal    Urinary retention    Intractable pain         A/P:  This is a 73 y.o. male with lumbar stenosis, right more than the left. Patient presenting with worsening left lower extremity pain, no numbness or tingling. Given concerns for worsening symptoms, patient transferred to [facility] for neurosurgery evaluation. Patient care will be discussed with attending, will reevaluate patient along with attending     - No neurosurgical interventions planned for now  - CTLS recommendations: none  - Encourage ambulation (PT/OT)  - HOB: 30 degrees   - Neuro checks per protocol  - Hold all antiplatelets and anticoagulants  - We recommend SBP < 140  - Pain management per primary team    Additional recommendations may follow    Please contact neurosurgery with any changes in patients neurologic status. Thank you for your consult.        Andrea San MD   NS pager [phone]  5/11/2022  8:11 PM

## 2022-05-12 NOTE — CARE COORDINATION
Case Management Initial Discharge Plan  Jose Luis Rich,             Met with:patient to discuss discharge plans. Information verified: address, contacts, phone number, , insurance Yes  Insurance Provider: [facility]    Emergency Contact/Next of Kin name & number: Humble Galvez [phone]  Who are involved in patient's support system? Family    PCP: TARA Pérez CNP  Date of last visit: 2022      Discharge Planning    Living Arrangements:  Spouse/Significant Other     Home has 1 story  1 step to climb to get into front door,   Location of bedroom/bathroom in home Main Floor    Patient able to perform ADL's:Independent    Current Services (outpatient & in home) None  DME equipment: None  DME provider: N/A    Is patient receiving oral anticoagulation therapy? No    If indicated:   Physician managing anticoagulation treatment: N/A  Where does patient obtain lab work for ATC treatment? N/A    Does patient have any issues/concerns obtaining medications? No  If yes, what are patient's concerns? Is there a preferred Pharmacy after hours or on weekends? Yes    If yes, which pharmacy? [facility] in [place]    Potential Assistance Needed:  N/A    Patient agreeable to home care: No  [place] of choice provided:  n/a    Prior SNF/Rehab Placement and Facility: None  Agreeable to SNF/Rehab: No  [place] of choice provided: n/a     Evaluation: no    Expected Discharge date:       Patient expects to be discharged to:  Home     If home: is the family and/or caregiver wiling & able to provide support at home? Yes  Who will be providing this support?  Family    Follow Up Appointment: Best Day/ Time: Monday AM    Transportation provider: Family  Transportation arrangements needed for discharge: No    Readmission Risk              Risk of Unplanned Readmission:  8             Does patient have a readmission risk score greater than 14?: No  If yes, follow-up appointment must be made within 7 days of discharge.      Goals of Care: No pain and return home      Educated Patient on transitional options, provided freedom of choice and are agreeable with plan      Discharge Plan: Home with family support, Outpatient therapy          Electronically signed by Dutch Tapia RN on 5/12/22 at 3:12 PM EDT

## 2022-05-13 LAB
HCT VFR BLD CALC: 41.8 % (ref 40.7–50.3)
HCT VFR BLD CALC: 42.9 % (ref 40.7–50.3)
HEMOGLOBIN: 14 G/DL (ref 13–17)
HEMOGLOBIN: 14.8 G/DL (ref 13–17)
PATHOLOGIST REVIEW: NORMAL
SURGICAL PATHOLOGY REPORT: NORMAL

## 2022-05-13 PROCEDURE — 85014 HEMATOCRIT: CPT

## 2022-05-13 PROCEDURE — 85018 HEMOGLOBIN: CPT

## 2022-05-13 PROCEDURE — 6360000002 HC RX W HCPCS: Performed by: NURSE PRACTITIONER

## 2022-05-13 PROCEDURE — 6370000000 HC RX 637 (ALT 250 FOR IP): Performed by: STUDENT IN AN ORGANIZED HEALTH CARE EDUCATION/TRAINING PROGRAM

## 2022-05-13 PROCEDURE — 99232 SBSQ HOSP IP/OBS MODERATE 35: CPT | Performed by: INTERNAL MEDICINE

## 2022-05-13 PROCEDURE — 6360000002 HC RX W HCPCS: Performed by: STUDENT IN AN ORGANIZED HEALTH CARE EDUCATION/TRAINING PROGRAM

## 2022-05-13 PROCEDURE — 99222 1ST HOSP IP/OBS MODERATE 55: CPT | Performed by: NEUROLOGICAL SURGERY

## 2022-05-13 PROCEDURE — 1200000000 HC SEMI PRIVATE

## 2022-05-13 PROCEDURE — 2580000003 HC RX 258: Performed by: STUDENT IN AN ORGANIZED HEALTH CARE EDUCATION/TRAINING PROGRAM

## 2022-05-13 PROCEDURE — 94761 N-INVAS EAR/PLS OXIMETRY MLT: CPT

## 2022-05-13 PROCEDURE — 6370000000 HC RX 637 (ALT 250 FOR IP): Performed by: NURSE PRACTITIONER

## 2022-05-13 PROCEDURE — APPSS15 APP SPLIT SHARED TIME 0-15 MINUTES: Performed by: NURSE PRACTITIONER

## 2022-05-13 PROCEDURE — 36415 COLL VENOUS BLD VENIPUNCTURE: CPT

## 2022-05-13 RX ADMIN — ENOXAPARIN SODIUM 40 MG: 100 INJECTION SUBCUTANEOUS at 08:03

## 2022-05-13 RX ADMIN — LISINOPRIL AND HYDROCHLOROTHIAZIDE 1 TABLET: 12.5; 2 TABLET ORAL at 08:03

## 2022-05-13 RX ADMIN — TAMSULOSIN HYDROCHLORIDE 0.8 MG: 0.4 CAPSULE ORAL at 08:03

## 2022-05-13 RX ADMIN — METHOCARBAMOL TABLETS 750 MG: 750 TABLET, COATED ORAL at 16:59

## 2022-05-13 RX ADMIN — GABAPENTIN 100 MG: 300 CAPSULE ORAL at 21:00

## 2022-05-13 RX ADMIN — HYDROMORPHONE HYDROCHLORIDE 0.5 MG: 1 INJECTION, SOLUTION INTRAMUSCULAR; INTRAVENOUS; SUBCUTANEOUS at 21:00

## 2022-05-13 RX ADMIN — HYDROMORPHONE HYDROCHLORIDE 0.5 MG: 1 INJECTION, SOLUTION INTRAMUSCULAR; INTRAVENOUS; SUBCUTANEOUS at 07:33

## 2022-05-13 RX ADMIN — METHOCARBAMOL TABLETS 750 MG: 750 TABLET, COATED ORAL at 08:03

## 2022-05-13 RX ADMIN — HYDROMORPHONE HYDROCHLORIDE 0.5 MG: 1 INJECTION, SOLUTION INTRAMUSCULAR; INTRAVENOUS; SUBCUTANEOUS at 01:24

## 2022-05-13 RX ADMIN — ACETAMINOPHEN 1000 MG: 500 TABLET ORAL at 08:04

## 2022-05-13 RX ADMIN — HYDROMORPHONE HYDROCHLORIDE 0.5 MG: 1 INJECTION, SOLUTION INTRAMUSCULAR; INTRAVENOUS; SUBCUTANEOUS at 16:59

## 2022-05-13 RX ADMIN — SODIUM CHLORIDE, PRESERVATIVE FREE 10 ML: 5 INJECTION INTRAVENOUS at 08:04

## 2022-05-13 RX ADMIN — SODIUM CHLORIDE, PRESERVATIVE FREE 10 ML: 5 INJECTION INTRAVENOUS at 21:00

## 2022-05-13 RX ADMIN — GABAPENTIN 100 MG: 300 CAPSULE ORAL at 08:03

## 2022-05-13 RX ADMIN — ACETAMINOPHEN 1000 MG: 500 TABLET ORAL at 17:00

## 2022-05-13 RX ADMIN — HYDROMORPHONE HYDROCHLORIDE 0.5 MG: 1 INJECTION, SOLUTION INTRAMUSCULAR; INTRAVENOUS; SUBCUTANEOUS at 10:37

## 2022-05-13 RX ADMIN — HYDROMORPHONE HYDROCHLORIDE 0.25 MG: 1 INJECTION, SOLUTION INTRAMUSCULAR; INTRAVENOUS; SUBCUTANEOUS at 13:20

## 2022-05-13 RX ADMIN — GABAPENTIN 100 MG: 300 CAPSULE ORAL at 13:20

## 2022-05-13 RX ADMIN — METHOCARBAMOL TABLETS 750 MG: 750 TABLET, COATED ORAL at 13:20

## 2022-05-13 RX ADMIN — HYDROMORPHONE HYDROCHLORIDE 0.5 MG: 1 INJECTION, SOLUTION INTRAMUSCULAR; INTRAVENOUS; SUBCUTANEOUS at 04:46

## 2022-05-13 RX ADMIN — METHOCARBAMOL TABLETS 750 MG: 750 TABLET, COATED ORAL at 21:00

## 2022-05-13 RX ADMIN — TADALAFIL 5 MG: 5 TABLET ORAL at 07:51

## 2022-05-13 RX ADMIN — ACETAMINOPHEN 1000 MG: 500 TABLET ORAL at 01:25

## 2022-05-13 ASSESSMENT — PAIN DESCRIPTION - ORIENTATION
ORIENTATION: RIGHT

## 2022-05-13 ASSESSMENT — PAIN SCALES - GENERAL
PAINLEVEL_OUTOF10: 10
PAINLEVEL_OUTOF10: 4
PAINLEVEL_OUTOF10: 4
PAINLEVEL_OUTOF10: 7
PAINLEVEL_OUTOF10: 10
PAINLEVEL_OUTOF10: 7
PAINLEVEL_OUTOF10: 4
PAINLEVEL_OUTOF10: 8
PAINLEVEL_OUTOF10: 8

## 2022-05-13 ASSESSMENT — PAIN DESCRIPTION - PAIN TYPE: TYPE: ACUTE PAIN

## 2022-05-13 ASSESSMENT — PAIN DESCRIPTION - LOCATION
LOCATION: BACK;LEG
LOCATION: LEG
LOCATION: LEG

## 2022-05-13 ASSESSMENT — PAIN DESCRIPTION - DESCRIPTORS: DESCRIPTORS: ACHING;CRAMPING

## 2022-05-13 ASSESSMENT — PAIN - FUNCTIONAL ASSESSMENT: PAIN_FUNCTIONAL_ASSESSMENT: PREVENTS OR INTERFERES SOME ACTIVE ACTIVITIES AND ADLS

## 2022-05-13 NOTE — PROGRESS NOTES
Neurosurgery JB/Resident    Daily Progress Note   CC:No chief complaint on file. 5/13/2022  6:49 AM    Chart reviewed. No acute events overnight. No new complaints. Resting in bed tolerating diet well, reports pain feel better controlled compared to yesterday and was able to walk to bathroom without significant pain    Vitals:    05/11/22 1933 05/12/22 0741 05/12/22 0755 05/12/22 2000   BP: (!) 168/86  (!) 148/82 (!) 109/54   Pulse: 88  86 81   Resp: 19 16 17 20   Temp: 98.3 °F (36.8 °C)  98.3 °F (36.8 °C) 98.1 °F (36.7 °C)   TempSrc: Oral  Oral Oral   SpO2: 95%  98% 98%       PE:   AOx3  Motor   L deltoid 5/5; R deltoid 5/5  L biceps 5/5; R biceps 5/5  L triceps 5/5; R triceps 5/5  L wrist extension 5/5; R wrist extension 5/5  L intrinsics 5/5; R intrinsics 5/5      L iliopsoas 5/5 , R iliopsoas 5/5  L quadriceps 5/5; R quadriceps 5/5  L Dorsiflexion 5/5; R dorsiflexion 4/5  L Plantarflexion 5/5; R plantarflexion 5/5  L EHL 5/5; R EHL 5/5    Sensation: intermittent numbness down entire right leg         Lab Results   Component Value Date    WBC 9.2 05/12/2022    HGB 14.0 05/13/2022    HCT 41.8 05/13/2022    PLT See Reflexed IPF Result 05/12/2022    CHOL 192 07/19/2021    TRIG 90 07/19/2021    HDL 51 02/09/2022    ALT 31 05/11/2022    AST 20 05/11/2022     05/12/2022    K 3.7 05/12/2022     05/12/2022    CREATININE 0.86 05/12/2022    BUN 18 05/12/2022    CO2 26 05/12/2022    PSA 8.50 (H) 02/28/2022    INR 1.0 05/12/2022    LABA1C 5.6 02/09/2022         A/P  73 y.o. male who presents with right leg pain, lumbar radiculopathy      - ok for diet at this time  - robaxin for muscle spasms  - Ok to begin prophylactic anticoagulation from neurosurgery stand point.   - Neuro checks per floor protocol  - NPO at midnight  - planning for OR tomorrow with Dr Lizbeth Aguilera or Dr Mackenzie Liu for L4-5 right foraminotomy       Please contact neurosurgery with any changes in patients neurologic status.        Annie Paz, CNP  5/13/22  6:49 AM

## 2022-05-13 NOTE — PLAN OF CARE
Problem: Discharge Planning  Goal: Discharge to home or other facility with appropriate resources  5/13/2022 1237 by Nicolette Cruz RN  Outcome: Progressing  5/13/2022 0255 by Ishaan Bains RN  Outcome: Progressing     Problem: Pain  Goal: Verbalizes/displays adequate comfort level or baseline comfort level  5/13/2022 1237 by Nicolette Cruz RN  Outcome: Progressing  5/13/2022 0255 by Ishaan Bains RN  Outcome: Progressing

## 2022-05-13 NOTE — PROGRESS NOTES
Messaged DO Mau Mack asking to place an order for a gan that was placed at [facility].  DO placed order

## 2022-05-13 NOTE — PROGRESS NOTES
[facility] ([facility])  Occupational Therapy Not Seen Note    DATE: 2022    NAME: Darrin Chavez  MRN: [id number]   : 1950      Patient not seen this date for Occupational Therapy due to: Other: OT will await neurosurgery consult and final POC prior to evaluating. Per internal medicine note: \"Right lower extremity radicular pain with L4-L5 foraminal stenosis: Await neurosurgery input regarding timing of surgical intervention. \" OT will check back this afternoon as appropriate    Next Scheduled Treatment: PM or 2022    Electronically signed by ZARI Roy on 2022 at 7:13 AM

## 2022-05-13 NOTE — PROGRESS NOTES
Physical Therapy        Physical Therapy Cancel Note      DATE: 2022    NAME: Wilbur Rodriges  MRN: [id number]   : 1950      Patient not seen this date for Physical Therapy due to:    Surgery/Procedure: plan for OR with neurosurgery tomorrow, PT will check back for post-op needs.       Electronically signed by Colten Pal PT on 2022 at 10:59 AM

## 2022-05-13 NOTE — PROGRESS NOTES
[facility]  Office: [address], DO, Easton Tothse, DO, Dwayne Gagnon, DO, Laurel Zimmerman, DO, Chau Sierra MD, Pro Still MD, Walker King MD, Lamin Miguel MD, Poonam Espinoza MD, Dinora Alejandre MD, Hernesto Hallman MD, Nahed Layton, DO, Corona Can, DO, Laure Barraza MD,  Mert Gao DO, Sukh Mathis MD, Namrata Moreira MD, Feroz Montiel MD, Delvin Kuhn, DO, Chastity Fofana MD, Cristian Alcantar MD, Karen Rose MD, Salas Mahmood, CNP, [facility], CNP, Tania Perez, CNP, Jesusita Huntley, CNP, Saroj Stephens, CNP, Agustín Messer, CNP, NEGRITA HollinsC, Bronwyn Rodrigez, DNP, Niru Mejia, CNP, Zora Vides, CNP, Collin Webb, CNP, Holly Jackson, CNS, Skip Blanco, DNP, Meagan López, CNP, Cadence Gregorio, CNP, Aileen Short, CNP         [facility] 19    Progress Note    Name:   Galilea Blood  MRN:     [id number]     Acct:      [de-identified]   Room:   [facility] Day:  2  Admit Date:  5/11/2022  5:04 PM    PCP:   TARA Adams CNP  Code Status:  Full Code    Subjective:     C/C: back pain  Interval History Status: unchanged. Patient indicates his back pain radiating into right knee and ankle persists but improved this morning. Denies any new complaints. Hematuria is also improved. Denies any bladder spasms  Is afebrile hemodynamically stable. Brief History:   72-year-old with prior history of hypertension, chronic back pain and BPH with negative prostate biopsy  presenting to [address] [place] [address] ED with complaints of acute worsening of the back pain along with pain in the right lower extremity. He was found to have acute urinary retention requiring Matos placement. Right lumbar spine showed multilevel DJD with L3-L4 moderate canal stenosis, severe L4-L5 right-sided foraminal stenosis.   Review of Systems:     Constitutional:  negative for chills, fevers, sweats  Respiratory: negative for cough, dyspnea on exertion, shortness of breath, wheezing  Cardiovascular:  negative for chest pain, chest pressure/discomfort, lower extremity edema, palpitations  Gastrointestinal:  negative for abdominal pain, constipation, diarrhea, nausea, vomiting  Neurological:  negative for dizziness, headache    Medications: Allergies: Allergies   Allergen Reactions    Influenza Vaccines Swelling       Current Meds:   Scheduled Meds:    methocarbamol  750 mg Oral 4x Daily    acetaminophen  1,000 mg Oral q8h    magnesium hydroxide  30 mL Oral Daily    tadalafil  5 mg Oral Daily    tamsulosin  0.8 mg Oral Daily    sodium chloride flush  5-40 mL IntraVENous 2 times per day    [Held by provider] enoxaparin  40 mg SubCUTAneous Daily    lisinopril-hydroCHLOROthiazide  1 tablet Oral Daily    gabapentin  100 mg Oral TID     Continuous Infusions:    sodium chloride       PRN Meds: HYDROmorphone **OR** HYDROmorphone, melatonin, sodium chloride flush, sodium chloride, potassium chloride **OR** potassium alternative oral replacement **OR** potassium chloride, magnesium sulfate, ondansetron **OR** ondansetron, polyethylene glycol, ketorolac    Data:     Past Medical History:   has a past medical history of Basal cell carcinoma, Erectile dysfunction, Hearing aid worn, Hypertension, Precancerous skin lesion, and Squamous cell carcinoma. Social History:   reports that he has never smoked. He has never used smokeless tobacco. He reports previous alcohol use. He reports that he does not use drugs. Family History:   Family History   Problem Relation Age of Onset    High Blood Pressure Mother     Diabetes Neg Hx        Vitals:  /74   Pulse 64   Temp 97.5 °F (36.4 °C) (Oral)   Resp 18   SpO2 97%   Temp (24hrs), Av.8 °F (36.6 °C), Min:97.5 °F (36.4 °C), Max:98.1 °F (36.7 °C)    No results for input(s): POCGLU in the last 72 hours. I/O (24Hr):     Intake/Output Summary (Last 24 hours) at 5/13/2022 1551  Last data filed at 5/13/2022 1153  Gross per 24 hour   Intake --   Output 2100 ml   Net -2100 ml       Labs:  Hematology:  Recent Labs     05/11/22  0931 05/11/22  0931 05/12/22  0651 05/12/22  0851 05/12/22  1812 05/13/22  0606   WBC 5.8  --   --  9.2  --   --    RBC 5.60  --   --  5.48  --   --    HGB 16.1   < >  --  15.8 14.8 14.0   HCT 46.8   < >  --  46.4 43.6 41.8   MCV 83.7  --   --  84.7  --   --    MCH 28.7  --   --  28.8  --   --    MCHC 34.3  --   --  34.1  --   --    RDW 15.0*  --   --  13.9  --   --    *  --   --  See Reflexed IPF Result  --   --    MPV 8.8  --   --   --   --   --    INR  --   --  1.0  --   --   --     < > = values in this interval not displayed. Chemistry:  Recent Labs     05/11/22  0931 05/12/22  0651    137   K 4.0 3.7    102   CO2 26 26   GLUCOSE 111* 115*   BUN 21 18   CREATININE 1.07 0.86   ANIONGAP 12 9   LABGLOM >60 >60   GFRAA >60 >60   CALCIUM 9.6 8.8     Recent Labs     05/11/22  0931   PROT 6.9   LABALBU 4.4   AST 20   ALT 31   ALKPHOS 51   BILITOT 0.44     Radiology:  MRI LUMBAR SPINE WO CONTRAST    Result Date: 5/11/2022  1. Multilevel lumbar spondylosis superimposed on a developmentally narrow canal due to short pedicles resulting in upwards of moderate canal stenosis at L3-4, mild to moderate canal stenosis at L4-5. 2. Severe right-sided foraminal stenosis at L4-5. 3. Markedly distended bladder. Correlate with concerns for outlet obstruction. Physical Examination:        General appearance:  alert, cooperative and no distress  Mental Status:  oriented to person, place and time and normal affect  Lungs:  clear to auscultation bilaterally, normal effort  Heart:  regular rate and rhythm, no murmur  Abdomen:  soft, nontender, nondistended, normal bowel sounds  Extremities: All extremities equally . denies any bowel incontinence . no edema, redness, tenderness in the calves  Skin:  no gross lesions, rashes, induration    Assessment: Hospital Problems           Last Modified POA    * (Principal) Central stenosis of spinal canal 5/11/2022 Yes    Spinal stenosis 5/11/2022 Yes    Urinary retention 5/11/2022 Yes    Intractable pain 5/11/2022 Yes    BPH (benign prostatic hyperplasia) 5/12/2022 Yes    Essential hypertension 5/11/2022 Yes    Cervical spondylolysis 5/11/2022 Yes        Plan:      -Right lower extremity radicular pain with L4-L5 foraminal stenosis: Neurosurgery planning on L4-L5 right foraminotomy tomorrow.   -New hematuria with acute urinary retention: Continue to monitor, likely from Matos trauma. Stable for now. Continue Matos catheter, Flomax.  - DVT prophylaxis with Lovenox.   -continue current pain management regimen.  -Continue home dose of Neurontin + hydrochlorothiazide and Cialis.     Marcus Evangelista MD  5/13/2022

## 2022-05-14 ENCOUNTER — APPOINTMENT (OUTPATIENT)
Dept: INTERVENTIONAL RADIOLOGY/VASCULAR | Age: 72
DRG: 552 | End: 2022-05-14
Attending: STUDENT IN AN ORGANIZED HEALTH CARE EDUCATION/TRAINING PROGRAM
Payer: MEDICARE

## 2022-05-14 LAB
HCT VFR BLD CALC: 42.5 % (ref 40.7–50.3)
HEMOGLOBIN: 14.6 G/DL (ref 13–17)

## 2022-05-14 PROCEDURE — 6360000004 HC RX CONTRAST MEDICATION: Performed by: INTERNAL MEDICINE

## 2022-05-14 PROCEDURE — 2709999900 HC NON-CHARGEABLE SUPPLY

## 2022-05-14 PROCEDURE — 3E0R33Z INTRODUCTION OF ANTI-INFLAMMATORY INTO SPINAL CANAL, PERCUTANEOUS APPROACH: ICD-10-PCS | Performed by: RADIOLOGY

## 2022-05-14 PROCEDURE — 85018 HEMOGLOBIN: CPT

## 2022-05-14 PROCEDURE — 85014 HEMATOCRIT: CPT

## 2022-05-14 PROCEDURE — 6360000002 HC RX W HCPCS: Performed by: RADIOLOGY

## 2022-05-14 PROCEDURE — 6370000000 HC RX 637 (ALT 250 FOR IP): Performed by: NURSE PRACTITIONER

## 2022-05-14 PROCEDURE — 36415 COLL VENOUS BLD VENIPUNCTURE: CPT

## 2022-05-14 PROCEDURE — 3E0R3BZ INTRODUCTION OF ANESTHETIC AGENT INTO SPINAL CANAL, PERCUTANEOUS APPROACH: ICD-10-PCS | Performed by: RADIOLOGY

## 2022-05-14 PROCEDURE — 99232 SBSQ HOSP IP/OBS MODERATE 35: CPT | Performed by: NEUROLOGICAL SURGERY

## 2022-05-14 PROCEDURE — 2500000003 HC RX 250 WO HCPCS: Performed by: RADIOLOGY

## 2022-05-14 PROCEDURE — 1200000000 HC SEMI PRIVATE

## 2022-05-14 PROCEDURE — 6370000000 HC RX 637 (ALT 250 FOR IP): Performed by: STUDENT IN AN ORGANIZED HEALTH CARE EDUCATION/TRAINING PROGRAM

## 2022-05-14 PROCEDURE — 62323 NJX INTERLAMINAR LMBR/SAC: CPT

## 2022-05-14 PROCEDURE — 99232 SBSQ HOSP IP/OBS MODERATE 35: CPT | Performed by: INTERNAL MEDICINE

## 2022-05-14 PROCEDURE — 2580000003 HC RX 258: Performed by: STUDENT IN AN ORGANIZED HEALTH CARE EDUCATION/TRAINING PROGRAM

## 2022-05-14 RX ORDER — BUPIVACAINE HYDROCHLORIDE 2.5 MG/ML
5 INJECTION, SOLUTION EPIDURAL; INFILTRATION; INTRACAUDAL ONCE
Status: COMPLETED | OUTPATIENT
Start: 2022-05-14 | End: 2022-05-14

## 2022-05-14 RX ORDER — DEXAMETHASONE SODIUM PHOSPHATE 10 MG/ML
8 INJECTION, SOLUTION INTRAMUSCULAR; INTRAVENOUS ONCE
Status: COMPLETED | OUTPATIENT
Start: 2022-05-14 | End: 2022-05-14

## 2022-05-14 RX ORDER — DEXAMETHASONE SODIUM PHOSPHATE 10 MG/ML
8 INJECTION, EMULSION INTRAMUSCULAR; INTRAVENOUS ONCE
Status: DISCONTINUED | OUTPATIENT
Start: 2022-05-14 | End: 2022-05-14

## 2022-05-14 RX ADMIN — ACETAMINOPHEN 1000 MG: 500 TABLET ORAL at 16:55

## 2022-05-14 RX ADMIN — LISINOPRIL AND HYDROCHLOROTHIAZIDE 1 TABLET: 12.5; 2 TABLET ORAL at 09:06

## 2022-05-14 RX ADMIN — SODIUM CHLORIDE, PRESERVATIVE FREE 10 ML: 5 INJECTION INTRAVENOUS at 21:18

## 2022-05-14 RX ADMIN — METHOCARBAMOL TABLETS 750 MG: 750 TABLET, COATED ORAL at 21:18

## 2022-05-14 RX ADMIN — SODIUM CHLORIDE, PRESERVATIVE FREE 10 ML: 5 INJECTION INTRAVENOUS at 09:07

## 2022-05-14 RX ADMIN — GABAPENTIN 100 MG: 300 CAPSULE ORAL at 21:18

## 2022-05-14 RX ADMIN — ACETAMINOPHEN 1000 MG: 500 TABLET ORAL at 01:45

## 2022-05-14 RX ADMIN — METHOCARBAMOL TABLETS 750 MG: 750 TABLET, COATED ORAL at 09:06

## 2022-05-14 RX ADMIN — METHOCARBAMOL TABLETS 750 MG: 750 TABLET, COATED ORAL at 16:55

## 2022-05-14 RX ADMIN — GABAPENTIN 100 MG: 300 CAPSULE ORAL at 09:06

## 2022-05-14 RX ADMIN — Medication 3 MG: at 22:26

## 2022-05-14 RX ADMIN — METHOCARBAMOL TABLETS 750 MG: 750 TABLET, COATED ORAL at 13:52

## 2022-05-14 RX ADMIN — ACETAMINOPHEN 1000 MG: 500 TABLET ORAL at 09:06

## 2022-05-14 RX ADMIN — IOPAMIDOL 3 ML: 612 INJECTION, SOLUTION INTRATHECAL at 11:26

## 2022-05-14 RX ADMIN — DEXAMETHASONE SODIUM PHOSPHATE 8 MG: 10 INJECTION INTRAMUSCULAR; INTRAVENOUS at 11:14

## 2022-05-14 RX ADMIN — TADALAFIL 5 MG: 5 TABLET ORAL at 09:07

## 2022-05-14 RX ADMIN — GABAPENTIN 100 MG: 300 CAPSULE ORAL at 13:52

## 2022-05-14 RX ADMIN — TAMSULOSIN HYDROCHLORIDE 0.8 MG: 0.4 CAPSULE ORAL at 09:06

## 2022-05-14 RX ADMIN — BUPIVACAINE HYDROCHLORIDE 12.5 MG: 2.5 INJECTION, SOLUTION EPIDURAL; INFILTRATION; INTRACAUDAL; PERINEURAL at 11:13

## 2022-05-14 ASSESSMENT — PAIN DESCRIPTION - DESCRIPTORS: DESCRIPTORS: ACHING;CRAMPING

## 2022-05-14 ASSESSMENT — PAIN SCALES - GENERAL
PAINLEVEL_OUTOF10: 2
PAINLEVEL_OUTOF10: 2
PAINLEVEL_OUTOF10: 10
PAINLEVEL_OUTOF10: 2
PAINLEVEL_OUTOF10: 3

## 2022-05-14 ASSESSMENT — PAIN DESCRIPTION - PAIN TYPE: TYPE: ACUTE PAIN

## 2022-05-14 ASSESSMENT — PAIN DESCRIPTION - FREQUENCY: FREQUENCY: CONTINUOUS

## 2022-05-14 ASSESSMENT — PAIN DESCRIPTION - ORIENTATION: ORIENTATION: RIGHT

## 2022-05-14 ASSESSMENT — PAIN DESCRIPTION - LOCATION: LOCATION: BACK;LEG

## 2022-05-14 ASSESSMENT — PAIN DESCRIPTION - ONSET: ONSET: ON-GOING

## 2022-05-14 NOTE — PROGRESS NOTES
Pt returned from IR pt states pain better a 2/10 and dyan lower back not going right . down leg pt given cup of coffee and water .  Continue to monitor

## 2022-05-14 NOTE — PROGRESS NOTES
Neurosurgery Service  Resident Daily Progress Note   5/14/2022 12:20 PM     Subjective    Patient seen and examined at the bedside. Chart reviewed. Discussed with nursing staff. No any acute event overnight. Patient continues to report pain. Vitals:    05/14/22 1114 05/14/22 1119 05/14/22 1124 05/14/22 1124   BP: (!) 179/86 (!) 193/84 (!) 191/90 (!) 191/90   Pulse:       Resp:       Temp:       TempSrc:       SpO2:           Physical Exam:  Gen: Resting comfortably, no acute distress  CV: RRR  Resp: CTAB  GI: Abdomen soft, non-tender  Skin: Cap refill< 2 seconds,      Neuro:      A&Ox3   PERRL, EOMI   CNII-XII intact     Normal speech and mentation      L deltoid 5/5; R deltoid 5/5  L biceps 5/5; R biceps 5/5  L triceps 5/5; R triceps 5/5  L wrist extension 5/5; R wrist extension 5/5  L intrinsics 5/5; R intrinsics 5/5      L iliopsoas 5/5 , R iliopsoas 5/5  L quadriceps 5/5; R quadriceps 5/5  L Dorsiflexion 5/5; R dorsiflexion 5/5  L Plantarflexion 5/5; R plantarflexion 5/5  L EHL 5/5; R EHL 5/5      Deep Tendon Reflexes:    Right Bicep:  1+  Left Bicep:  1+  Right Knee:  1+  Left Knee:  1+  Labs:  Lab Results   Component Value Date    WBC 9.2 05/12/2022    HGB 14.6 05/14/2022    HCT 42.5 05/14/2022    PLT See Reflexed IPF Result 05/12/2022    CHOL 192 07/19/2021    TRIG 90 07/19/2021    HDL 51 02/09/2022    ALT 31 05/11/2022    AST 20 05/11/2022     05/12/2022    K 3.7 05/12/2022     05/12/2022    CREATININE 0.86 05/12/2022    BUN 18 05/12/2022    CO2 26 05/12/2022    PSA 8.50 (H) 02/28/2022    INR 1.0 05/12/2022    LABA1C 5.6 02/09/2022       Radiology (last 24 hours):   No new studies    ASSESSMENT & PLAN:    Koleen Boxer is a 73 y.o. male who presents with right leg pain, lumbar radiculopathy    -Lumbar spondylosis superimposed moderate canal stenosis L4-L5.    -Severe right-sided foraminal stenosis L4-L5      -Okay for diet at this time  -Robaxin for muscle spasm  -Okay to begin prophylactic anticoagulation from neurosurgery standpoint  -Neurochecks per floor protocol  -Steroid injection as per Dr. Dick Black for conservative management               Please contact neurosurgery with any changes in patient's neurologic status.       Demarcus Villegas MD  PGY-3 Neurology Resident Physician  Neurosurgery/Neuro Critical Care Team  Pager [phone]

## 2022-05-14 NOTE — PROGRESS NOTES
[facility]  Office: [address], DO, Isabel Zhao, DO, Andres Kevin, DO, Leigh Ann Zimmerman, DO, Marilyn Morrison MD, Anel Vargas MD, Delroy Hook MD, Marry Owens MD, Renee Guerrero MD, Tania Crawford MD, Elzbieta Barnes MD, Keara Sánchez, DO, Joselito Buenrostro, DO, Kati Daily MD,  Nathanial Apgar, DO, Smiley Hatfield MD, Noy Rai MD, Cesar Mckeon MD, Quynh Mackenzie DO, Arnel Coles MD, Ridge Fortune MD, Tempie Mcburney, MD, Silva Dominguez [facility], [facility], CNP, Therese Power, CNP, Nestor Dong, CNP, Kae Dubin, CNP, Sis Caba, CNP, NEGRITA OsegueraC, John Reeder, [facility], Mert Valerio, CNP, Elvie De La Garza, CNP, Jelly Tolbert, CNP, Lissy Duncan, CNS, Alli Dueñas, [facility], Gonzalez Cedeño, CNP, Juju Hicks, CNP, Deb Thomas, [facility] 19    Progress Note    Name:   Vickie Graham  MRN:     [id number]     Acct:      [de-identified]   Room:   0437/6673-69   Day:  3  Admit Date:  2022  5:04 PM    PCP:   TARA Reyes CNP  Code Status:  Full Code    Subjective:     C/C: back pain  Interval History Status: improved. Patient underwentcaudal epidural decadron and marcaine injection this morning. States back pain is much improved since. Continues to have gan, hematuria is resolved . Brief History:   42-year-old with prior history of hypertension, chronic back pain and BPH with negative prostate biopsy  presenting to [facility] ED with complaints of acute worsening of the back pain along with pain in the right lower extremity. He was found to have acute urinary retention requiring Gan placement. Right lumbar spine showed multilevel DJD with L3-L4 moderate canal stenosis, severe L4-L5 right-sided foraminal stenosis.   Review of Systems:     Constitutional:  negative for chills, fevers, sweats  Respiratory:  negative for cough, dyspnea on exertion, shortness of breath, wheezing  Cardiovascular:  negative for chest pain, chest pressure/discomfort, lower extremity edema, palpitations  Gastrointestinal:  negative for abdominal pain, constipation, diarrhea, nausea, vomiting  Neurological:  negative for dizziness, headache    Medications: Allergies: Allergies   Allergen Reactions    Influenza Vaccines Swelling       Current Meds:   Scheduled Meds:    methocarbamol  750 mg Oral 4x Daily    acetaminophen  1,000 mg Oral q8h    magnesium hydroxide  30 mL Oral Daily    tadalafil  5 mg Oral Daily    tamsulosin  0.8 mg Oral Daily    sodium chloride flush  5-40 mL IntraVENous 2 times per day    [Held by provider] enoxaparin  40 mg SubCUTAneous Daily    lisinopril-hydroCHLOROthiazide  1 tablet Oral Daily    gabapentin  100 mg Oral TID     Continuous Infusions:    sodium chloride       PRN Meds: HYDROmorphone **OR** HYDROmorphone, melatonin, sodium chloride flush, sodium chloride, potassium chloride **OR** potassium alternative oral replacement **OR** potassium chloride, magnesium sulfate, ondansetron **OR** ondansetron, polyethylene glycol    Data:     Past Medical History:   has a past medical history of Basal cell carcinoma, Erectile dysfunction, Hearing aid worn, Hypertension, Precancerous skin lesion, and Squamous cell carcinoma. Social History:   reports that he has never smoked. He has never used smokeless tobacco. He reports previous alcohol use. He reports that he does not use drugs. Family History:   Family History   Problem Relation Age of Onset    High Blood Pressure Mother     Diabetes Neg Hx        Vitals:  BP (!) 157/80   Pulse 112   Temp 98 °F (36.7 °C) (Oral)   Resp 18   SpO2 95%   Temp (24hrs), Av.4 °F (36.9 °C), Min:97.9 °F (36.6 °C), Max:99.4 °F (37.4 °C)    No results for input(s): POCGLU in the last 72 hours. I/O (24Hr):     Intake/Output Summary (Last 24 hours) at 2022  Last data filed at 2022 1726  Gross per 24 hour   Intake 1800 ml   Output 5025 ml   Net -3225 ml       Labs:  Hematology:  Recent Labs     05/12/22  0651 05/12/22  0851 05/12/22  1812 05/13/22  0606 05/13/22  1724 05/14/22  0534   WBC  --  9.2  --   --   --   --    RBC  --  5.48  --   --   --   --    HGB  --  15.8   < > 14.0 14.8 14.6   HCT  --  46.4   < > 41.8 42.9 42.5   MCV  --  84.7  --   --   --   --    MCH  --  28.8  --   --   --   --    MCHC  --  34.1  --   --   --   --    RDW  --  13.9  --   --   --   --    PLT  --  See Reflexed IPF Result  --   --   --   --    INR 1.0  --   --   --   --   --     < > = values in this interval not displayed. Chemistry:  Recent Labs     05/12/22  0651      K 3.7      CO2 26   GLUCOSE 115*   BUN 18   CREATININE 0.86   ANIONGAP 9   LABGLOM >60   GFRAA >60   CALCIUM 8.8     No results for input(s): PROT, LABALBU, LABA1C, A8XGXTX, L3UIOCA, FT4, TSH, AST, ALT, LDH, GGT, ALKPHOS, LABGGT, BILITOT, BILIDIR, AMMONIA, AMYLASE, LIPASE, LACTATE, CHOL, HDL, LDLCHOLESTEROL, CHOLHDLRATIO, TRIG, VLDL, QUK72AJ, PHENYTOIN, PHENYF, URICACID, POCGLU in the last 72 hours. Radiology:  MRI LUMBAR SPINE WO CONTRAST    Result Date: 5/11/2022  1. Multilevel lumbar spondylosis superimposed on a developmentally narrow canal due to short pedicles resulting in upwards of moderate canal stenosis at L3-4, mild to moderate canal stenosis at L4-5. 2. Severe right-sided foraminal stenosis at L4-5. 3. Markedly distended bladder. Correlate with concerns for outlet obstruction. Physical Examination:        General appearance:  alert, cooperative and no distress  Mental Status:  oriented to person, place and time and normal affect  Lungs:  clear to auscultation bilaterally, normal effort  Heart:  regular rate and rhythm, no murmur  Abdomen:  soft, nontender, nondistended, normal bowel sounds  Extremities: All extremities equally . denies any bowel incontinence . no edema, redness, tenderness in the calves  Skin:  no gross lesions, rashes, induration    Assessment:        Hospital Problems           Last Modified POA    * (Principal) Central stenosis of spinal canal 5/11/2022 Yes    Spinal stenosis 5/11/2022 Yes    Urinary retention 5/11/2022 Yes    Intractable pain 5/11/2022 Yes    BPH (benign prostatic hyperplasia) 5/12/2022 Yes    Essential hypertension 5/11/2022 Yes    Cervical spondylolysis 5/11/2022 Yes        Plan:      -Right lower extremity radicular pain with L4-L5 foraminal stenosis: s/p caudal epidural steroid injection.   -New hematuria with acute urinary retention: conitinue Flomax. Void trial when ok with Urology. - DVT prophylaxis with Lovenox.   -continue current pain management regimen.  -Continue home dose of Neurontin + hydrochlorothiazide and Cialis.   Plan to d/c in AM after void trial .    Aurelio Chris MD  5/14/2022

## 2022-05-14 NOTE — PROGRESS NOTES
Urology. consulted  on 5/12 for urinary retention. pt had epidural injection today and plan on dc tomorrow. pt still has gan per consult note  it states to notify urology day before dc to order voiding trial and to dc gan they plan on dc pt tomorrow perfect served urology for voiding trail orders per perfect serve.  Urology resident called me back and stated she will put orders in to dc gan tomorrow rt wants to wait to tomorrow rt having epidural block today

## 2022-05-15 VITALS
DIASTOLIC BLOOD PRESSURE: 78 MMHG | HEART RATE: 76 BPM | RESPIRATION RATE: 18 BRPM | SYSTOLIC BLOOD PRESSURE: 155 MMHG | OXYGEN SATURATION: 96 % | TEMPERATURE: 98.4 F

## 2022-05-15 PROCEDURE — 97161 PT EVAL LOW COMPLEX 20 MIN: CPT

## 2022-05-15 PROCEDURE — 6370000000 HC RX 637 (ALT 250 FOR IP): Performed by: STUDENT IN AN ORGANIZED HEALTH CARE EDUCATION/TRAINING PROGRAM

## 2022-05-15 PROCEDURE — 97165 OT EVAL LOW COMPLEX 30 MIN: CPT

## 2022-05-15 PROCEDURE — 6370000000 HC RX 637 (ALT 250 FOR IP): Performed by: NURSE PRACTITIONER

## 2022-05-15 PROCEDURE — 97535 SELF CARE MNGMENT TRAINING: CPT

## 2022-05-15 PROCEDURE — 99232 SBSQ HOSP IP/OBS MODERATE 35: CPT | Performed by: NEUROLOGICAL SURGERY

## 2022-05-15 PROCEDURE — 97530 THERAPEUTIC ACTIVITIES: CPT

## 2022-05-15 PROCEDURE — 51798 US URINE CAPACITY MEASURE: CPT

## 2022-05-15 PROCEDURE — 99238 HOSP IP/OBS DSCHRG MGMT 30/<: CPT | Performed by: INTERNAL MEDICINE

## 2022-05-15 RX ORDER — TAMSULOSIN HYDROCHLORIDE 0.4 MG/1
0.8 CAPSULE ORAL DAILY
Qty: 30 CAPSULE | Refills: 3 | Status: SHIPPED | OUTPATIENT
Start: 2022-05-16 | End: 2022-05-15

## 2022-05-15 RX ORDER — GABAPENTIN 100 MG/1
100 CAPSULE ORAL 3 TIMES DAILY
Qty: 90 CAPSULE | Refills: 2 | Status: SHIPPED | OUTPATIENT
Start: 2022-05-15 | End: 2022-08-18 | Stop reason: SDUPTHER

## 2022-05-15 RX ORDER — TAMSULOSIN HYDROCHLORIDE 0.4 MG/1
0.8 CAPSULE ORAL DAILY
Qty: 30 CAPSULE | Refills: 3 | Status: SHIPPED | OUTPATIENT
Start: 2022-05-16 | End: 2022-06-23 | Stop reason: SDUPTHER

## 2022-05-15 RX ADMIN — METHOCARBAMOL TABLETS 750 MG: 750 TABLET, COATED ORAL at 13:09

## 2022-05-15 RX ADMIN — TAMSULOSIN HYDROCHLORIDE 0.8 MG: 0.4 CAPSULE ORAL at 08:11

## 2022-05-15 RX ADMIN — ACETAMINOPHEN 1000 MG: 500 TABLET ORAL at 08:08

## 2022-05-15 RX ADMIN — GABAPENTIN 100 MG: 300 CAPSULE ORAL at 08:11

## 2022-05-15 RX ADMIN — TADALAFIL 5 MG: 5 TABLET ORAL at 08:08

## 2022-05-15 RX ADMIN — GABAPENTIN 100 MG: 300 CAPSULE ORAL at 13:09

## 2022-05-15 RX ADMIN — LISINOPRIL AND HYDROCHLOROTHIAZIDE 1 TABLET: 12.5; 2 TABLET ORAL at 08:11

## 2022-05-15 RX ADMIN — METHOCARBAMOL TABLETS 750 MG: 750 TABLET, COATED ORAL at 08:11

## 2022-05-15 RX ADMIN — ACETAMINOPHEN 1000 MG: 500 TABLET ORAL at 01:10

## 2022-05-15 ASSESSMENT — PAIN SCALES - GENERAL
PAINLEVEL_OUTOF10: 0

## 2022-05-15 NOTE — PROGRESS NOTES
[facility]  Office: [address], DO, Molina Buffalo, DO, Savannah Balint, DO, Edgar Light Blood, DO, Jevon Larry MD, Rajni Dawn MD, Jose Sandhu MD, Guillermina Rodarte MD, Shruthi Aguilera MD, bAbey Hooks MD, Alexandrea Granda MD, Lamonte Cooks, DO, Elizabeth Chavis, DO, Carlos Sinclair MD,  Rossana Almonte, DO, Jagdish Vasquez MD, Nati Caro MD, Saroj Martinez MD, Jeremy Dakins, DO, Martha Love MD, Mere Puckett MD, Nikos Blackwood MD, Farrukh Gibson [facility], [facility], CNP, Ny Jose, CNP, Gm Dennis, CNP, Hector Pérez, CNP, Jamia Burden, CNP, Mary Wallace PA-C, Saran Thayer [facility], Victorina Schuler, CNP, Isabelle Sandoval, CNP, Asa Han, CNP, Syed Wilkinson, CNS, Elkin Merida, [facility], Joselyn Valdovinos, CNP, Michelle Rosenberg, CNP, Shelley Mejia, [facility]    Progress Note    Name:   Daniela Duke  MRN:     [id number]     Acct:      [de-identified]   Room:   279/6680-80   Day:  4  Admit Date:  5/11/2022  5:04 PM    PCP:   Flossie Hamman, APRN - CNP  Code Status:  Full Code    Subjective:     C/C: back pain  Interval History Status: improved. Patient states his back pain is much improved denies any new complaints. Is afebrile, hemodynamically stable. Brief History:   72-year-old with prior history of hypertension, chronic back pain and BPH with negative prostate biopsy  presenting to [facility] ED with complaints of acute worsening of the back pain along with pain in the right lower extremity. He was found to have acute urinary retention requiring Matos placement. Right lumbar spine showed multilevel DJD with L3-L4 moderate canal stenosis, severe L4-L5 right-sided foraminal stenosis. Patient underwent caudal epidural decadron and marcaine injection 05/14/22.    Review of Systems:     Constitutional:  negative for chills, fevers, sweats  Respiratory:  negative for cough, dyspnea on exertion, shortness of breath, wheezing  Cardiovascular:  negative for chest pain, chest pressure/discomfort, lower extremity edema, palpitations  Gastrointestinal:  negative for abdominal pain, constipation, diarrhea, nausea, vomiting  Neurological:  negative for dizziness, headache    Medications: Allergies: Allergies   Allergen Reactions    Influenza Vaccines Swelling       Current Meds:   Scheduled Meds:    methocarbamol  750 mg Oral 4x Daily    acetaminophen  1,000 mg Oral q8h    magnesium hydroxide  30 mL Oral Daily    tadalafil  5 mg Oral Daily    tamsulosin  0.8 mg Oral Daily    sodium chloride flush  5-40 mL IntraVENous 2 times per day    [Held by provider] enoxaparin  40 mg SubCUTAneous Daily    lisinopril-hydroCHLOROthiazide  1 tablet Oral Daily    gabapentin  100 mg Oral TID     Continuous Infusions:    sodium chloride       PRN Meds: HYDROmorphone **OR** HYDROmorphone, melatonin, sodium chloride flush, sodium chloride, potassium chloride **OR** potassium alternative oral replacement **OR** potassium chloride, magnesium sulfate, ondansetron **OR** ondansetron, polyethylene glycol    Data:     Past Medical History:   has a past medical history of Basal cell carcinoma, Erectile dysfunction, Hearing aid worn, Hypertension, Precancerous skin lesion, and Squamous cell carcinoma. Social History:   reports that he has never smoked. He has never used smokeless tobacco. He reports previous alcohol use. He reports that he does not use drugs. Family History:   Family History   Problem Relation Age of Onset    High Blood Pressure Mother     Diabetes Neg Hx        Vitals:  BP (!) 155/78   Pulse 76   Temp 98.4 °F (36.9 °C) (Oral)   Resp 18   SpO2 96%   Temp (24hrs), Av.2 °F (36.8 °C), Min:98 °F (36.7 °C), Max:98.4 °F (36.9 °C)    No results for input(s): POCGLU in the last 72 hours. I/O (24Hr):     Intake/Output Summary (Last 24 hours) at 5/15/2022 1301  Last data filed at 5/15/2022 1210  Gross per 24 hour   Intake 3000 ml   Output 4425 ml   Net -1425 ml       Labs:  Hematology:  Recent Labs     05/13/22  0606 05/13/22  1724 05/14/22  0534   HGB 14.0 14.8 14.6   HCT 41.8 42.9 42.5     Radiology:  MRI LUMBAR SPINE WO CONTRAST  Result Date: 5/11/2022  1. Multilevel lumbar spondylosis superimposed on a developmentally narrow canal due to short pedicles resulting in upwards of moderate canal stenosis at L3-4, mild to moderate canal stenosis at L4-5. 2. Severe right-sided foraminal stenosis at L4-5. 3. Markedly distended bladder. Correlate with concerns for outlet obstruction. Physical Examination:        General appearance:  alert, cooperative and no distress  Mental Status:  oriented to person, place and time and normal affect  Lungs:  clear to auscultation bilaterally, normal effort  Heart:  regular rate and rhythm, no murmur  Abdomen:  soft, nontender, nondistended, normal bowel sounds  Extremities: All extremities equally . denies any bowel incontinence . no edema, redness, tenderness in the calves  Skin:  no gross lesions, rashes, induration, small amount of blood in urine. Assessment:        Hospital Problems           Last Modified POA    * (Principal) Central stenosis of spinal canal 5/11/2022 Yes    Spinal stenosis 5/11/2022 Yes    Urinary retention 5/11/2022 Yes    Intractable pain 5/11/2022 Yes    BPH (benign prostatic hyperplasia) 5/12/2022 Yes    Essential hypertension 5/11/2022 Yes    Cervical spondylolysis 5/11/2022 Yes        Plan:      -Right lower extremity radicular pain with L4-L5 foraminal stenosis: s/p caudal epidural steroid injection. - New hematuria with acute urinary retention: continue Flomax. Void trial today. - DVT prophylaxis with Lovenox.   -continue current pain management regimen.  -Continue home dose of Neurontin + hydrochlorothiazide and Cialis.   Plan to d/c after void trial .    Tiffanie Heller MD  5/15/2022

## 2022-05-15 NOTE — PROGRESS NOTES
Dr Connolly Able rounded and stated ok to dc gan catheter and do post void bladder scans  Gan dc'd at 930am

## 2022-05-15 NOTE — PROGRESS NOTES
Occupational Therapy  Facility/Department: [address] ORTHO/MED SURG  Occupational Therapy Initial Assessment    Name: Wilbur Rodriges  : 1950  MRN: [id number]  Date of Service: 5/15/2022    Discharge Recommendations:    No occupational therapy recommended at discharge      Patient Diagnosis(es): There were no encounter diagnoses. Past Medical History:  has a past medical history of Basal cell carcinoma, Erectile dysfunction, Hearing aid worn, Hypertension, Precancerous skin lesion, and Squamous cell carcinoma. Past Surgical History:  has a past surgical history that includes pre-malignant / benign skin lesion excision; Mohs surgery; malignant skin lesion excision; Tonsillectomy; and Prostate biopsy (N/A, 2021). Treatment Diagnosis: spinal canal stenosis      Assessment   Performance deficits / Impairments: Decreased functional mobility ; Decreased ADL status; Decreased endurance;Decreased high-level IADLs  Treatment Diagnosis: spinal canal stenosis  Prognosis: Good  Decision Making: Low Complexity  REQUIRES OT FOLLOW-UP: Yes  Activity Tolerance  Activity Tolerance: Patient Tolerated treatment well        Plan   Plan  Times per Week: 1-3 visits     Restrictions  Restrictions/Precautions  Required Braces or Orthoses?: No  Position Activity Restriction  Other position/activity restrictions: up with assist    Subjective   General  Patient assessed for rehabilitation services?: Yes  Family / Caregiver Present: No  General Comment  Comments: rN ok'd for therapy this morning. pt agreeable to participate in session and cooperative/pleasant throughout.  pt denied pain throughout session     Social/Functional History  Social/Functional History  Lives With: Spouse  Type of Home: House  Home Layout: One level  Home Access: Stairs to enter without rails  Entrance Stairs - Number of Steps: 1  Bathroom Shower/Tub: Tub/Shower unit  Bathroom Toilet: Standard  Bathroom Equipment: Built-in shower seat  Home Equipment: Therapy;Plan of Care  Education Method: Verbal  Barriers to Learning: None  Education Outcome: Verbalized understanding     LUE AROM (degrees)  LUE AROM : WFL  Left Hand AROM (degrees)  Left Hand AROM: WFL  RUE AROM (degrees)  RUE AROM : WFL  Right Hand AROM (degrees)  Right Hand AROM: WFL        Hand Dominance  Hand Dominance: Right            AM-PAC Score        AM-PAC Inpatient Daily Activity Raw Score: 20 (05/15/22 1150)  AM-PAC Inpatient ADL T-Scale Score : 42.03 (05/15/22 1150)  ADL Inpatient CMS 0-100% Score: 38.32 (05/15/22 1150)  ADL Inpatient CMS G-Code Modifier : CJ (05/15/22 1150)    Goals  Short Term Goals  Time Frame for Short term goals: pt will, by discharge  Short Term Goal 1: complete LB ADLs and toileting tasks with supervision and AE, as needed  Short Term Goal 2: complete UB ADLS with mod I  Short Term Goal 3: dem proper body mechanics during bending/reaching activity  Short Term Goal 4: dem ~8 minutes dynamic standing tolerance with supervision in order to complete functional tasks  Short Term Goal 5: dem supervision during functional transfers/functional mobility       Therapy Time   Individual Concurrent Group Co-treatment   Time In 0758         Time Out 0819         Minutes 21      co-eval with PT    Timed Code Treatment Minutes: Lali 1765, OTR/L

## 2022-05-15 NOTE — PROGRESS NOTES
pts ride here pt dc'd  To front door per wc and dc home per private car and wife.  Pt left with all belongings

## 2022-05-15 NOTE — DISCHARGE SUMMARY
[facility]  Office: [address], DO, Lorenzo Cristi, DO, Shayy Mendezaver, DO, Mabelmariann Emily Zimmerman, DO, Heidy Bahena MD, Felicia Valera MD, Raquel Chavez MD, Thierry Velarde MD, Reinaldo Nazario MD, Tank Billings MD, Beau Stephenson MD, Rubi Shankar, DO, Adalberto Diaz, DO, Sandra Chavez MD,  Dylan Archer DO, Ravindra Garcia MD, Smiley Cervantes MD, Narcisa Oseguera MD, Robbie Tinoco DO, Yvon Astudillo MD, Cody Pineda MD, Briana Veras MD, Karina Martinez, [facility], [facility] [place], CNP, Sammi Salcedo, CNP, Alpha Grain, CNP, Abraham Ing, CNP, Johnny Robertson, CNP, NEGRITA LópezC, Terry Chandler, DNP, Victor Manuel Spaulding, CNP, Honorio Barber, CNP, Andry Michaud, CNP, Juju Todd, CNS, Kar Handy, [facility], Ian Gonzalez, CNP, Gloria Mehta, CNP, Yael Molstephon, CNP         [address] [place] [address]    Discharge Summary     Patient ID: Shelley Cronin  :  1950   MRN: [id number]     ACCOUNT:  [de-identified]   Patient's PCP: TARA Munoz CNP  Admit Date: 2022   Discharge Date: 5/15/2022 Length of Stay: 4  Code Status:  Full Code  Admitting Physician: Reinaldo Nazario MD  Discharge Physician: Reinaldo Nazario MD     Active Discharge Diagnoses:     Hospital Problem Lists:  Principal Problem:    Central stenosis of spinal canal  Active Problems:    Spinal stenosis    Urinary retention    Intractable pain    BPH (benign prostatic hyperplasia)    Essential hypertension    Cervical spondylolysis  Resolved Problems:    * No resolved hospital problems. *      Admission Condition:  fair   Discharged Condition: good    Hospital Stay:     Hospital Course:    72-year-old with prior history of hypertension, chronic back pain and BPH with negative prostate biopsy  presenting to [address] [place] [address] ED with complaints of acute worsening of the back pain along with pain in the right lower extremity.   He was found to have acute urinary retention requiring Matos placement. Right lumbar spine showed multilevel DJD with L3-L4 moderate canal stenosis, severe L4-L5 right-sided foraminal stenosis. Patient underwent caudal epidural decadron and marcaine injection 05/14/22. Patient's symptoms improved after this and a void trial was successfully done on the day of discharge. Radiology:  MRI LUMBAR SPINE WO CONTRAST    Result Date: 5/11/2022  1. Multilevel lumbar spondylosis superimposed on a developmentally narrow canal due to short pedicles resulting in upwards of moderate canal stenosis at L3-4, mild to moderate canal stenosis at L4-5. 2. Severe right-sided foraminal stenosis at L4-5. 3. Markedly distended bladder. Correlate with concerns for outlet obstruction. Consultations:    Consults:     Final Specialist Recommendations/Findings:   IP CONSULT TO UROLOGY  IP CONSULT TO NEUROSURGERY      The patient was seen and examined on day of discharge and this discharge summary is in conjunction with any daily progress note from day of discharge. Discharge plan:     Disposition: Home    Physician Follow Up:  Los Jeff MD  [address] [facility], [address] [facility]  [phone]      Follow-up regarding your recent urinary retention and PSA    Cecelia Metcalf MD  [facility] [address] [place] [address]  [phone]      For lumbar radiculuopathy     Dandy Moreno DO  [facility] [address] [facility] [place]  . [facility] [address]  [phone]    In 1 month      Trisha Cuellar, APRN - CNP  [facility]  [address] [facility] [address]  [place] [address]  [phone]      As needed       Diet: regular diet    Activity: As tolerated    Discharge Medications:      Medication List      START taking these medications    gabapentin 100 MG capsule  Commonly known as: NEURONTIN  Take 1 capsule by mouth 3 times daily for 90 days.         CHANGE how you take these medications    tamsulosin 0.4 MG capsule  Commonly known as: FLOMAX  Take 2 capsules by mouth daily  Start taking on: May 16, 2022  What changed: See the new instructions. CONTINUE taking these medications    Amna-C 500-200-60 MG Tabs     FLAXSEED OIL PO     lisinopril-hydroCHLOROthiazide 20-12.5 MG per tablet  Commonly known as: PRINZIDE;ZESTORETIC  Take 1 tablet by mouth daily     METAMUCIL PO     Red Yeast Rice 600 MG Caps  Take 1,200 mg by mouth 2 times daily     tadalafil 5 MG tablet  Commonly known as: CIALIS  Take 1 tablet by mouth daily     Vitamin D3 25 MCG (1000 UT) Caps        STOP taking these medications    cyclobenzaprine 10 MG tablet  Commonly known as: FLEXERIL     predniSONE 10 MG tablet  Commonly known as: Janit Player           Where to Get Your Medications      These medications were sent to [facility] [address] [place] [address] [address], [address]  [address]    Phone: [phone]   gabapentin 100 MG capsule     You can get these medications from any pharmacy    Bring a paper prescription for each of these medications  tamsulosin 0.4 MG capsule         No discharge procedures on file. Time Spent on discharge is  20 mins in patient examination, evaluation, counseling as well as medication reconciliation, prescriptions for required medications, discharge plan and follow up. Electronically signed by   Eileen Hsieh MD  5/15/2022      Thank you TARA Conde - JAY JAY for the opportunity to be involved in this patient's care.

## 2022-05-15 NOTE — PROGRESS NOTES
Neurosurgery Service  Resident Daily Progress Note   5/15/2022 10:07 AM     Subjective    Patient seen and examined at the bedside. Chart reviewed. Discussed with nursing staff. No any acute event overnight. Patient  Endorses significant improvement of  Back pain        Vitals:    05/14/22 1124 05/14/22 1745 05/14/22 1918 05/15/22 0745   BP: (!) 191/90 132/72 (!) 157/80 (!) 155/78   Pulse:   112 76   Resp:   18    Temp:   98 °F (36.7 °C) 98.4 °F (36.9 °C)   TempSrc:   Oral Oral   SpO2:   95% 96%       Physical Exam:  Gen: Resting comfortably, no acute distress  CV: RRR  Resp: CTAB  GI: Abdomen soft, non-tender  Skin: Cap refill< 2 seconds,      Neuro:      A&Ox3   PERRL, EOMI   CNII-XII intact     Normal speech and mentation      L deltoid 5/5; R deltoid 5/5  L biceps 5/5; R biceps 5/5  L triceps 5/5; R triceps 5/5  L wrist extension 5/5; R wrist extension 5/5  L intrinsics 5/5; R intrinsics 5/5      L iliopsoas 5/5 , R iliopsoas 5/5  L quadriceps 5/5; R quadriceps 5/5  L Dorsiflexion 5/5; R dorsiflexion 5/5  L Plantarflexion 5/5; R plantarflexion 5/5  L EHL 5/5; R EHL 5/5      Deep Tendon Reflexes:    Right Bicep:  1+  Left Bicep:  1+  Right Knee:  1+  Left Knee:  1+  Labs:  Lab Results   Component Value Date    WBC 9.2 05/12/2022    HGB 14.6 05/14/2022    HCT 42.5 05/14/2022    PLT See Reflexed IPF Result 05/12/2022    CHOL 192 07/19/2021    TRIG 90 07/19/2021    HDL 51 02/09/2022    ALT 31 05/11/2022    AST 20 05/11/2022     05/12/2022    K 3.7 05/12/2022     05/12/2022    CREATININE 0.86 05/12/2022    BUN 18 05/12/2022    CO2 26 05/12/2022    PSA 8.50 (H) 02/28/2022    INR 1.0 05/12/2022    LABA1C 5.6 02/09/2022       Radiology (last 24 hours):   No new studies    ASSESSMENT & PLAN:    Lesli Noyola is a 73 y.o. male who presents with right leg pain, lumbar radiculopathy    -Lumbar spondylosis superimposed moderate canal stenosis L4-L5.    -Severe right-sided foraminal stenosis L4-L5      -Cate Alex for diet at this time  -Robaxin for muscle spasm  -Okay to begin prophylactic anticoagulation from neurosurgery standpoint  -Neurochecks per floor protocol                 Please contact neurosurgery with any changes in patient's neurologic status.       Dwayne Monterroso MD  PGY-3 Neurology Resident Physician  Neurosurgery/Neuro Critical Care Team  Pager [phone]

## 2022-05-15 NOTE — PROGRESS NOTES
Physical Therapy  Facility/Department: [address] ORTHO/MED SURG  Physical Therapy Initial Assessment    Name: Galilea Blood  : 1950  MRN: [id number]  Date of Service: 5/15/2022  57-year-old with prior history of hypertension, chronic back pain and BPH with negative prostate biopsy  presenting to [facility] ED with complaints of acute worsening of the back pain along with pain in the right lower extremity. He was found to have acute urinary retention requiring Matos placement. Right lumbar spine showed multilevel DJD with L3-L4 moderate canal stenosis, severe L4-L5 right-sided foraminal stenosis. Patient underwent caudal epidural decadron and marcaine injection 22. Discharge Recommendations:  No therapy recommended at discharge      Patient Diagnosis(es): There were no encounter diagnoses. Past Medical History:  has a past medical history of Basal cell carcinoma, Erectile dysfunction, Hearing aid worn, Hypertension, Precancerous skin lesion, and Squamous cell carcinoma. Past Surgical History:  has a past surgical history that includes pre-malignant / benign skin lesion excision; Mohs surgery; malignant skin lesion excision; Tonsillectomy; and Prostate biopsy (N/A, 2021). Assessment   Assessment: The pt ambulated 300 ft without a device x SBA. He ambulated safely with no c/o pain , dizziness, or fatigue.  No further PT intervention is needed at this time  Therapy Prognosis: Good  Decision Making: Medium Complexity  Requires PT Follow-Up: No  Activity Tolerance  Activity Tolerance: Patient tolerated treatment well     Plan   Plan  Plan: Discharge with evaluation only  Safety Devices  Type of Devices: Call light within reach,Left in bed,Gait belt,Nurse notified  Restraints  Restraints Initially in Place: No     Restrictions  Restrictions/Precautions  Required Braces or Orthoses?: No  Position Activity Restriction  Other position/activity restrictions: up with assist     Subjective General  Patient assessed for rehabilitation services?: Yes  Response To Previous Treatment: Not applicable  Family / Caregiver Present: No  Follows Commands: Within Functional Limits  Subjective  Subjective: Pt denies pain         Social/Functional History  Social/Functional History  Lives With: Spouse  Type of Home: House  Home Layout: One level  Home Access: Stairs to enter without rails  Entrance Stairs - Number of Steps: 1  Bathroom Shower/Tub: Tub/Shower unit  Bathroom Toilet: Standard  Bathroom Equipment: Built-in shower seat  Home Equipment:  (pt reported no use of DME at baseline)  ADL Assistance: [address]: Independent  Homemaking Responsibilities: Yes (pt reported splitting with wife)  Meal Prep Responsibility: Secondary  Laundry Responsibility: Secondary  Cleaning Responsibility: Secondary  Ambulation Assistance: Independent  Transfer Assistance: Independent  Active : Yes  Mode of Transportation: Truck,SUV  Occupation: Retired  Type of Occupation: [employer]  Leisure & Hobbies: yard work, work on cars  Vision/Hearing  Vision Exceptions: Wears glasses for reading  Hearing: Within functional limits    Cognition   Orientation  Overall Orientation Status: Within Functional Limits  Orientation Level: Oriented X4  Cognition  Overall Cognitive Status: WFL     Objective      AROM RLE (degrees)  RLE AROM: WNL  AROM LLE (degrees)  LLE AROM : WNL  AROM RUE (degrees)  RUE AROM : WNL  AROM LUE (degrees)  LUE AROM : WNL  Strength RLE  Strength RLE: WFL  Strength LLE  Strength LLE: WFL  Strength RUE  Strength RUE: WFL  Strength LUE  Strength LUE: WFL     Balance  Sitting: Intact (~10 minutes on EOB with mod I)  Standing: Intact (~3 minutes. pt completed functional mobility in hallway to simulate household distances with CGA and no DME)  Gait  Overall Level of Assistance: Contact-guard assistance  Bed mobility  Supine to Sit: Stand by assistance  Sit to Supine: Stand by assistance  Scooting: Stand by assistance  Transfers  Sit to Stand: Stand by assistance  Stand to sit: Stand by assistance  Ambulation  Surface: level tile  Device: No Device  Assistance: Stand by assistance  Distance: amb 300 ft without a device x SBA     Balance  Posture: Good  Sitting - Static: Good  Sitting - Dynamic: Fair  Standing - Static: Good  Standing - Dynamic: Good     OutComes Score     AM-PAC Score  AM-PAC Inpatient Mobility Raw Score : 21 (05/15/22 1523)  AM-PAC Inpatient T-Scale Score : 50.25 (05/15/22 1523)  Mobility Inpatient CMS 0-100% Score: 28.97 (05/15/22 1523)  Mobility Inpatient CMS G-Code Modifier : CJ (05/15/22 1523)        Goals  Short Term Goals  Time Frame for Short term goals: No PT needs at this time      DC   PT     Education  Patient Education  Education Given To: Patient  Education Provided: Role of Therapy;Plan of Care  Education Method: Demonstration  Barriers to Learning: None  Education Outcome: Verbalized understanding    Therapy Time   Individual Concurrent Group Co-treatment   Time In [id number]         Time Out 0820         Minutes [facility],

## 2022-05-15 NOTE — PROGRESS NOTES
Pt voided 200 ml clear yellow urine and bladder scanned for 177 ml pt voided 250 ml of clear yellow urine bladder scanned for 131 ml  Pt states he feels fine and is emptying his bladder pt states im ready to go home.  notified dr Brianna Connolly Able to put dc in

## 2022-05-19 ENCOUNTER — OFFICE VISIT (OUTPATIENT)
Dept: UROLOGY | Age: 72
End: 2022-05-19
Payer: MEDICARE

## 2022-05-19 VITALS
HEIGHT: 69 IN | WEIGHT: 185 LBS | TEMPERATURE: 96.8 F | SYSTOLIC BLOOD PRESSURE: 131 MMHG | DIASTOLIC BLOOD PRESSURE: 82 MMHG | HEART RATE: 91 BPM | BODY MASS INDEX: 27.4 KG/M2 | RESPIRATION RATE: 16 BRPM

## 2022-05-19 DIAGNOSIS — R97.20 ELEVATED PSA: ICD-10-CM

## 2022-05-19 DIAGNOSIS — R33.9 URINARY RETENTION: Primary | ICD-10-CM

## 2022-05-19 PROCEDURE — 99213 OFFICE O/P EST LOW 20 MIN: CPT | Performed by: NURSE PRACTITIONER

## 2022-05-19 ASSESSMENT — ENCOUNTER SYMPTOMS
ABDOMINAL PAIN: 0
COUGH: 0
EYE PAIN: 0
SHORTNESS OF BREATH: 0
VOMITING: 0
DIARRHEA: 0
WHEEZING: 0
CONSTIPATION: 0
NAUSEA: 0
BACK PAIN: 0

## 2022-05-19 NOTE — PROGRESS NOTES
Review of Systems   Constitutional: Negative for appetite change, chills, fatigue and fever. Eyes: Negative for pain and visual disturbance. Respiratory: Negative for cough, shortness of breath and wheezing. Cardiovascular: Negative for chest pain and leg swelling. Gastrointestinal: Negative for abdominal pain, constipation, diarrhea, nausea and vomiting. Genitourinary: Negative for difficulty urinating, dysuria, frequency, hematuria, penile pain and testicular pain. Musculoskeletal: Negative for back pain and myalgias. Neurological: Negative for dizziness, tremors, weakness, light-headedness, numbness and headaches. Hematological: Negative for adenopathy. Does not bruise/bleed easily. better then the condition he was in but could be better.

## 2022-05-19 NOTE — PROGRESS NOTES
[address] [address]  Dept: [address] [facility] Urology Office Note - Established    Patient:  Earl Cleary  YOB: 1950  Date: 5/19/2022    The patient is a 73 y.o. male who presents todayfor evaluation of the following problems:   Chief Complaint   Patient presents with    Follow-Up from Hospital     er f/u urinary retention       HPI  Patient is presenting for hospital follow-up. Hx of BPH and elevated psa with abnormal MRI- PIRADS 4 and negative biopsy. Sees Dr. Lillie Navarro for these issues. He was recently admitted for acute low back pain, numbness and tingling. MRI showed lumbar stenosis with narrow canal and moderate canal stenosis. Found to be in acute urinary retention at that time- 700cc. Required gan catheter and passed voiding trial prior to discharge. He has a f/u with neurology upcoming. He continues flomax BID and feels his urinary symptoms are back to baseline. AUA SS is 2, he is not straining to start stream- strong and steady, feels he is emptying well. He denies hematuria, dysuria, flank pain, fever or chills. Denies any other concerns for today's visit. Summary of old records: N/A    Additional History: N/A    Procedures Today: N/A    Urinalysis today:  No results found for this visit on 05/19/22. Last several PSA's:  Lab Results   Component Value Date    PSA 8.50 (H) 02/28/2022    PSA 8.51 (H) 05/18/2021    PSA 7.76 (H) 11/17/2020     Last total testosterone:  Lab Results   Component Value Date    TESTOSTERONE 437 09/18/2013       AUA Symptom Score (5/19/2022):   INCOMPLETE EMPTYING: How often have you had the sensation of not emptying your bladder?: Not at all  FREQUENCY: How often do you have to urinate less than every two hours?: Not at all  INTERMITTENCY: How often have you found you stopped and started again several times when you urinated?: Not at all  URGENCY: How often have you found it difficult to postpone urination?: Not at all  WEAK STREAM: How often have you had a weak urinary stream?: Not at all  STRAINING: How often have you had to strain to start  urination?: Not at all  NOCTURIA: How many times did you typically get up at night to uriniate?: 2 Times  TOTAL I-PSS SCORE[de-identified] 2       Last BUN and creatinine:  Lab Results   Component Value Date    BUN 18 05/12/2022     Lab Results   Component Value Date    CREATININE 0.86 05/12/2022       Additional Lab/Culture results: none    Imaging Reviewed during this Office Visit: none  (results were independently reviewed by physician and radiology report verified)    PAST MEDICAL, FAMILY AND SOCIAL HISTORY UPDATE:  Past Medical History:   Diagnosis Date    Basal cell carcinoma     Erectile dysfunction     Hearing aid worn     both ears    Hypertension     Bety Sood CNP    Precancerous skin lesion     Squamous cell carcinoma      Past Surgical History:   Procedure Laterality Date    MALIGNANT SKIN LESION EXCISION      MOHS SURGERY      PRE-MALIGNANT / BENIGN SKIN LESION EXCISION      PROSTATE BIOPSY N/A 8/16/2021    FUSION PROSTATE BIOPSY AND ULTRASOUND performed by Chan Vaelntin MD at [place] [address]       Family History   Problem Relation Age of Onset    High Blood Pressure Mother     Diabetes Neg Hx      Outpatient Medications Marked as Taking for the 5/19/22 encounter (Office Visit) with TARA Adames - CNP   Medication Sig Dispense Refill    gabapentin (NEURONTIN) 100 MG capsule Take 1 capsule by mouth 3 times daily for 90 days.  90 capsule 2    tamsulosin (FLOMAX) 0.4 MG capsule Take 2 capsules by mouth daily 30 capsule 3    tadalafil (CIALIS) 5 MG tablet Take 1 tablet by mouth daily 90 tablet 3    lisinopril-hydroCHLOROthiazide (PRINZIDE;ZESTORETIC) 20-12.5 MG per tablet Take 1 tablet by mouth daily 90 tablet 1    Flaxseed, Linseed, (FLAXSEED OIL PO) Take 1,000 mLs by mouth      Red Yeast Rice 600 MG CAPS Take 1,200 mg by mouth 2 times daily 360 capsule 0    Cholecalciferol (VITAMIN D3) 25 MCG (1000 UT) CAPS Take 1,000 Units by mouth daily       Psyllium (METAMUCIL PO) Take by mouth daily       Bioflavonoid Products (KATHRYN-C) 500-200-60 MG TABS Take 1 tablet by mouth daily         Influenza vaccines  Social History     Tobacco Use   Smoking Status Never Smoker   Smokeless Tobacco Never Used     (Ifpatient a smoker, smoking cessation counseling offered)    Social History     Substance and Sexual Activity   Alcohol Use Not Currently    Alcohol/week: 0.0 standard drinks    Comment: socially       REVIEW OF SYSTEMS:  Review of Systems    Physical Exam:      Vitals:    05/19/22 1052   BP: 131/82   Pulse: 91   Resp: 16   Temp: 96.8 °F (36 °C)     Body mass index is 27.32 kg/m². Patient is a 73 y.o. male in no acute distress and alert and oriented to person, place and time. Physical Exam  Constitutional: Patient in no acute distress. Neuro: Alert and oriented to person, place and time. Psych: Mood normal, affect normal  Skin: No rash noted  Lungs: Respiratory effort is normal  Cardiovascular: Warm & Pink  Abdomen: Soft, non-tender, non-distended with no CVA,  No flank tenderness  Bladder non-tender and not distended. Musculoskeletal: Normal gait and station      Assessment and Plan      1. Urinary retention    2. Elevated PSA           Plan:   He had an episode of acute urinary retention while admitted for back issues. Passed void trial prior to discharge. Discussed recommendations to obtain PVR- unfortunately unable to complete in office d/t bladder scanner being down. Encouraged pvr per straight cath or bladder scan, patient declines at this time as he is back to baseline. Continue flomax BID, SE reviewed and pt verbalized understanding.     We thoroughly reviewed s/s urinary retention, patient to notify urology immediately and get scheduled or go to ER. Verbalized understanding and agreeable. Will keep f/u in September for hx elevated PSA, abnormal MRI, and neg bx in past.     Call with any new or worsening urinary symptoms, questions or concerns. Return in about 4 months (around 9/19/2022) for Dr. Buddie Lombard- elevated PSA and BPH (already scheduled). Prescriptions Ordered:  No orders of the defined types were placed in this encounter. Orders Placed:  No orders of the defined types were placed in this encounter. TARA Coffey CNP    Reviewed and agree with the ROS entered by the MA.

## 2022-06-17 ENCOUNTER — OFFICE VISIT (OUTPATIENT)
Dept: NEUROSURGERY | Age: 72
End: 2022-06-17
Payer: MEDICARE

## 2022-06-17 VITALS
HEART RATE: 88 BPM | WEIGHT: 197.4 LBS | TEMPERATURE: 98.6 F | DIASTOLIC BLOOD PRESSURE: 87 MMHG | OXYGEN SATURATION: 98 % | BODY MASS INDEX: 29.15 KG/M2 | SYSTOLIC BLOOD PRESSURE: 172 MMHG

## 2022-06-17 DIAGNOSIS — M54.16 LUMBAR RADICULOPATHY, ACUTE: Primary | ICD-10-CM

## 2022-06-17 DIAGNOSIS — M48.061 LUMBAR FORAMINAL STENOSIS: ICD-10-CM

## 2022-06-17 PROCEDURE — 1123F ACP DISCUSS/DSCN MKR DOCD: CPT | Performed by: NEUROLOGICAL SURGERY

## 2022-06-17 PROCEDURE — 99214 OFFICE O/P EST MOD 30 MIN: CPT | Performed by: NEUROLOGICAL SURGERY

## 2022-06-17 NOTE — PROGRESS NOTES
Concern    Not on file   Social History Narrative    Not on file     Social Determinants of Health     Financial Resource Strain: Low Risk     Difficulty of Paying Living Expenses: Not hard at all   Food Insecurity: No Food Insecurity    Worried About [address] in the Last Year: Never true    [address] [facility] [address] in the Last Year: Never true   Transportation Needs:     Lack of Transportation (Medical): Not on file    Lack of Transportation (Non-Medical): Not on file   Physical Activity:     Days of Exercise per Week: Not on file    Minutes of Exercise per Session: Not on file   Stress:     Feeling of Stress : Not on file   Social Connections:     Frequency of Communication with Friends and Family: Not on file    Frequency of Social Gatherings with Friends and Family: Not on file    Attends Congregation Services: Not on file    Active Member of Clubs or Organizations: Not on file    Attends Club or Organization Meetings: Not on file    Marital Status: Not on file   Intimate Partner Violence:     Fear of Current or Ex-Partner: Not on file    Emotionally Abused: Not on file    Physically Abused: Not on file    Sexually Abused: Not on file   Housing Stability:     Unable to Pay for Housing in the Last Year: Not on file    Number of [place] in the Last Year: Not on file    Unstable Housing in the Last Year: Not on file       Family History:       Problem Relation Age of Onset    High Blood Pressure Mother     Diabetes Neg Hx        Allergies:  Influenza vaccines    Home Medications:  Prior to Admission medications    Medication Sig Start Date End Date Taking? Authorizing Provider   lisinopril-hydroCHLOROthiazide (PRINZIDE;ZESTORETIC) 20-25 MG per tablet Take 1 tablet by mouth daily 5/26/22  Yes TARA Davis - CNP   gabapentin (NEURONTIN) 100 MG capsule Take 1 capsule by mouth 3 times daily for 90 days.  5/15/22 8/13/22 Yes Kingsley Dobbins MD   tamsulosin (FLOMAX) 0.4 MG capsule Take 2 capsules by mouth

## 2022-06-23 DIAGNOSIS — N40.1 BPH WITH OBSTRUCTION/LOWER URINARY TRACT SYMPTOMS: Primary | ICD-10-CM

## 2022-06-23 DIAGNOSIS — R33.9 URINARY RETENTION: ICD-10-CM

## 2022-06-23 DIAGNOSIS — N13.8 BPH WITH OBSTRUCTION/LOWER URINARY TRACT SYMPTOMS: Primary | ICD-10-CM

## 2022-06-26 RX ORDER — TAMSULOSIN HYDROCHLORIDE 0.4 MG/1
0.4 CAPSULE ORAL DAILY
Qty: 90 CAPSULE | Refills: 3 | Status: SHIPPED | OUTPATIENT
Start: 2022-06-26

## 2022-07-07 ENCOUNTER — TELEPHONE (OUTPATIENT)
Dept: UROLOGY | Age: 72
End: 2022-07-07

## 2022-07-07 NOTE — TELEPHONE ENCOUNTER
Received voicemail from patient regarding Flomax script that was sent in on 6/26, directions only read to take once daily and patient takes twice daily. New script called into [facility] with corrected directions to take twice daily. Patient notified.

## 2022-08-23 ENCOUNTER — OFFICE VISIT (OUTPATIENT)
Dept: NEUROSURGERY | Age: 72
End: 2022-08-23
Payer: MEDICARE

## 2022-08-23 VITALS
HEART RATE: 76 BPM | OXYGEN SATURATION: 98 % | DIASTOLIC BLOOD PRESSURE: 83 MMHG | HEIGHT: 69 IN | WEIGHT: 195 LBS | BODY MASS INDEX: 28.88 KG/M2 | TEMPERATURE: 97.2 F | SYSTOLIC BLOOD PRESSURE: 167 MMHG | RESPIRATION RATE: 20 BRPM

## 2022-08-23 DIAGNOSIS — M48.061 LUMBAR FORAMINAL STENOSIS: ICD-10-CM

## 2022-08-23 DIAGNOSIS — M47.812 CERVICAL SPONDYLOSIS: ICD-10-CM

## 2022-08-23 DIAGNOSIS — M54.16 LUMBAR RADICULOPATHY, ACUTE: Primary | ICD-10-CM

## 2022-08-23 PROCEDURE — 99214 OFFICE O/P EST MOD 30 MIN: CPT | Performed by: NEUROLOGICAL SURGERY

## 2022-08-23 PROCEDURE — 1123F ACP DISCUSS/DSCN MKR DOCD: CPT | Performed by: NEUROLOGICAL SURGERY

## 2022-08-23 NOTE — PROGRESS NOTES
education level: Not on file   Occupational History    Not on file   Tobacco Use    Smoking status: Never    Smokeless tobacco: Never   Substance and Sexual Activity    Alcohol use: Not Currently     Alcohol/week: 0.0 standard drinks     Comment: socially    Drug use: No    Sexual activity: Yes     Partners: Female   Other Topics Concern    Not on file   Social History Narrative    Not on file     Social Determinants of Health     Financial Resource Strain: Low Risk     Difficulty of Paying Living Expenses: Not hard at all   Food Insecurity: No Food Insecurity    Worried About Running Out of Food in the Last Year: Never true    Ran Out of Food in the Last Year: Never true   Transportation Needs: Not on file   Physical Activity: Inactive    Days of Exercise per Week: 0 days    Minutes of Exercise per Session: 0 min   Stress: Not on file   Social Connections: Not on file   Intimate Partner Violence: Not on file   Housing Stability: Not on file       Family History:       Problem Relation Age of Onset    High Blood Pressure Mother     Diabetes Neg Hx        Allergies:  Influenza vaccines    Home Medications:  Prior to Admission medications    Medication Sig Start Date End Date Taking? Authorizing Provider   gabapentin (NEURONTIN) 100 MG capsule Take 1 capsule by mouth 3 times daily for 90 days.  8/18/22 11/16/22 Yes TARA Combs CNP   NONFORMULARY Nitro Oxide Boost   Yes Historical Provider, MD   tamsulosin [facility]) 0.4 MG capsule Take 1 capsule by mouth daily 6/26/22  Yes Patricia Salmon MD   lisinopril-hydroCHLOROthiazide (PRINZIDE;ZESTORETIC) 20-25 MG per tablet Take 1 tablet by mouth daily 5/26/22  Yes TARA Combs CNP   tadalafil (CIALIS) 5 MG tablet Take 1 tablet by mouth daily 3/8/22  Yes Patricia Salmon MD   Flaxseed, Linseed, (FLAXSEED OIL PO) Take 1,000 mLs by mouth   Yes Historical Provider, MD   Red Yeast Rice 600 MG CAPS Take 1,200 mg by mouth 2 times daily 1/18/21  Yes Victor Manuel Bryant under the direction and in the presence of Jolie Oconnor DO. Electronically signed: Guillermina Cage DO, Scribe, 8/23/22        This note was created using voice recognition software. There may be inaccuracies of transcription  that are inadvertently overlooked prior to the signature. There is any questions about the transcription please contact me.

## 2022-08-24 ENCOUNTER — HOSPITAL ENCOUNTER (OUTPATIENT)
Age: 72
Setting detail: SPECIMEN
Discharge: HOME | End: 2022-08-24

## 2022-08-24 DIAGNOSIS — R97.20 ELEVATED PSA: ICD-10-CM

## 2022-08-24 DIAGNOSIS — R53.83 LOW ENERGY: ICD-10-CM

## 2022-08-24 DIAGNOSIS — E78.49 OTHER HYPERLIPIDEMIA: ICD-10-CM

## 2022-08-24 LAB
ABSOLUTE EOS #: 0.37 K/UL (ref 0–0.44)
ABSOLUTE IMMATURE GRANULOCYTE: 0.05 K/UL (ref 0–0.3)
ABSOLUTE LYMPH #: 2.01 K/UL (ref 1.1–3.7)
ABSOLUTE MONO #: 0.37 K/UL (ref 0.1–1.2)
ALBUMIN SERPL-MCNC: 4.4 G/DL (ref 3.5–5.2)
ALBUMIN/GLOBULIN RATIO: 1.8 (ref 1–2.5)
ALP BLD-CCNC: 45 U/L (ref 40–129)
ALT SERPL-CCNC: 23 U/L (ref 5–41)
ANION GAP SERPL CALCULATED.3IONS-SCNC: 9 MMOL/L (ref 9–17)
AST SERPL-CCNC: 24 U/L
BASOPHILS # BLD: 1 % (ref 0–2)
BASOPHILS ABSOLUTE: 0.06 K/UL (ref 0–0.2)
BILIRUB SERPL-MCNC: 0.45 MG/DL (ref 0.3–1.2)
BUN BLDV-MCNC: 13 MG/DL (ref 8–23)
CALCIUM SERPL-MCNC: 9.3 MG/DL (ref 8.6–10.4)
CHLORIDE BLD-SCNC: 102 MMOL/L (ref 98–107)
CHOLESTEROL, FASTING: 255 MG/DL
CHOLESTEROL/HDL RATIO: 5.9
CO2: 28 MMOL/L (ref 20–31)
CREAT SERPL-MCNC: 0.95 MG/DL (ref 0.7–1.2)
EOSINOPHILS RELATIVE PERCENT: 7 % (ref 1–4)
GFR AFRICAN AMERICAN: >60 ML/MIN
GFR NON-AFRICAN AMERICAN: >60 ML/MIN
GFR SERPL CREATININE-BSD FRML MDRD: ABNORMAL ML/MIN/{1.73_M2}
GLUCOSE BLD-MCNC: 125 MG/DL (ref 70–99)
HCT VFR BLD CALC: 46.5 % (ref 40.7–50.3)
HDLC SERPL-MCNC: 43 MG/DL
HEMOGLOBIN: 15.8 G/DL (ref 13–17)
IMMATURE GRANULOCYTES: 1 %
LDL CHOLESTEROL: 180 MG/DL (ref 0–130)
LYMPHOCYTES # BLD: 38 % (ref 24–43)
MCH RBC QN AUTO: 30.3 PG (ref 25.2–33.5)
MCHC RBC AUTO-ENTMCNC: 34 G/DL (ref 28.4–34.8)
MCV RBC AUTO: 89.1 FL (ref 82.6–102.9)
MONOCYTES # BLD: 7 % (ref 3–12)
NRBC AUTOMATED: 0 PER 100 WBC
PDW BLD-RTO: 13.8 % (ref 11.8–14.4)
PLATELET # BLD: 139 K/UL (ref 138–453)
PMV BLD AUTO: 11.5 FL (ref 8.1–13.5)
POTASSIUM SERPL-SCNC: 4.1 MMOL/L (ref 3.7–5.3)
PROSTATE SPECIFIC ANTIGEN: 8.16 NG/ML
RBC # BLD: 5.22 M/UL (ref 4.21–5.77)
SEG NEUTROPHILS: 46 % (ref 36–65)
SEGMENTED NEUTROPHILS ABSOLUTE COUNT: 2.42 K/UL (ref 1.5–8.1)
SODIUM BLD-SCNC: 139 MMOL/L (ref 135–144)
TESTOSTERONE TOTAL: 736 NG/DL (ref 220–1000)
TOTAL PROTEIN: 6.8 G/DL (ref 6.4–8.3)
TRIGLYCERIDE, FASTING: 161 MG/DL
WBC # BLD: 5.3 K/UL (ref 3.5–11.3)

## 2022-09-13 ENCOUNTER — OFFICE VISIT (OUTPATIENT)
Dept: UROLOGY | Age: 72
End: 2022-09-13
Payer: MEDICARE

## 2022-09-13 VITALS
WEIGHT: 195 LBS | HEIGHT: 69 IN | BODY MASS INDEX: 28.88 KG/M2 | DIASTOLIC BLOOD PRESSURE: 88 MMHG | SYSTOLIC BLOOD PRESSURE: 170 MMHG | OXYGEN SATURATION: 98 % | HEART RATE: 76 BPM

## 2022-09-13 DIAGNOSIS — N52.9 ERECTILE DYSFUNCTION, UNSPECIFIED ERECTILE DYSFUNCTION TYPE: ICD-10-CM

## 2022-09-13 DIAGNOSIS — N13.8 BPH WITH OBSTRUCTION/LOWER URINARY TRACT SYMPTOMS: Primary | ICD-10-CM

## 2022-09-13 DIAGNOSIS — R97.20 ELEVATED PSA: ICD-10-CM

## 2022-09-13 DIAGNOSIS — N40.1 BPH WITH OBSTRUCTION/LOWER URINARY TRACT SYMPTOMS: Primary | ICD-10-CM

## 2022-09-13 PROCEDURE — 1123F ACP DISCUSS/DSCN MKR DOCD: CPT | Performed by: UROLOGY

## 2022-09-13 PROCEDURE — 99214 OFFICE O/P EST MOD 30 MIN: CPT | Performed by: UROLOGY

## 2022-09-13 ASSESSMENT — ENCOUNTER SYMPTOMS
SHORTNESS OF BREATH: 0
COUGH: 0
WHEEZING: 0

## 2022-09-13 NOTE — LETTER
[address] [address]  Dept: [phone]  Dept Fax: [phone]        9/13/22    Patient: Sundar Joshi  YOB: 1950    Dear TARA Zhu CNP,    I had the pleasure of seeing one of your patients, Trina Melara today in the office today. Below are the relevant portions of my assessment and plan of care. IMPRESSION:  1. BPH with obstruction/lower urinary tract symptoms    2. Elevated PSA    3. Erectile dysfunction, unspecified erectile dysfunction type        PLAN:  He is doing well. Flomax and Cialis are helping him void well. Continue both. PSA slightly improved. F/U in 6 months with a PSA. Return in about 6 months (around 3/13/2023). Prescriptions Ordered:  No orders of the defined types were placed in this encounter. Orders Placed:  Orders Placed This Encounter   Procedures    PSA, Diagnostic     Standing Status:   Future     Standing Expiration Date:   9/13/2023          Thank you for allowing me to participate in the care of this patient. I will keep you updated on this patient's follow up and I look forward to serving you and your patients again in the future.         Christofer Thornton MD

## 2022-09-13 NOTE — PROGRESS NOTES
performed by Blanche Floyd MD at [address] [facility]       Family History   Problem Relation Age of Onset    High Blood Pressure Mother     Diabetes Neg Hx      Outpatient Medications Marked as Taking for the 9/13/22 encounter (Office Visit) with Blanche Floyd MD   Medication Sig Dispense Refill    gabapentin (NEURONTIN) 100 MG capsule Take 1 capsule by mouth 3 times daily for 90 days. 270 capsule 1    NONFORMULARY Nitro Oxide Boost      tamsulosin (FLOMAX) 0.4 MG capsule Take 1 capsule by mouth daily 90 capsule 3    lisinopril-hydroCHLOROthiazide (PRINZIDE;ZESTORETIC) 20-25 MG per tablet Take 1 tablet by mouth daily 90 tablet 1    tadalafil (CIALIS) 5 MG tablet Take 1 tablet by mouth daily 90 tablet 3    Flaxseed, Linseed, (FLAXSEED OIL PO) Take 1,000 mLs by mouth      Red Yeast Rice 600 MG CAPS Take 1,200 mg by mouth 2 times daily 360 capsule 0    Cholecalciferol (VITAMIN D3) 25 MCG (1000 UT) CAPS Take 1,000 Units by mouth daily       Psyllium (METAMUCIL PO) Take by mouth daily       Bioflavonoid Products (KATHRYN-C) 500-200-60 MG TABS Take 1 tablet by mouth daily         Influenza vaccines  Social History     Tobacco Use   Smoking Status Never   Smokeless Tobacco Never     (Ifpatient a smoker, smoking cessation counseling offered)    Social History     Substance and Sexual Activity   Alcohol Use Not Currently    Alcohol/week: 0.0 standard drinks    Comment: socially       REVIEW OF SYSTEMS:  Review of Systems    Physical Exam:      Vitals:    09/13/22 0931   BP: (!) 170/88   Pulse: 76   SpO2: 98%     Body mass index is 28.8 kg/m². Patient is a 81 y.o. male in no acute distress and alert and oriented to person, place and time. Physical Exam  Constitutional: Patient in no acute distress. Neuro: Alert and oriented to person, place and time.   Psych: Mood normal, affect normal  Lungs: Respiratory effort is normal  Cardiovascular: Warm & Pink  Abdomen: Soft, non-tender, non-distended with no CVA,  No flank tenderness,  Or hepatosplenomegaly   Lymphatics: No palpablelymphadenopathy. COCO 50 grams, smooth, no nodules. Assessment and Plan      1. BPH with obstruction/lower urinary tract symptoms    2. Elevated PSA    3. Erectile dysfunction, unspecified erectile dysfunction type           Plan:         He is doing well. Flomax and Cialis are helping him void well. Continue both. PSA slightly improved. F/U in 6 months with a PSA. Return in about 6 months (around 3/13/2023). Prescriptions Ordered:  No orders of the defined types were placed in this encounter. Orders Placed:  Orders Placed This Encounter   Procedures    PSA, Diagnostic     Standing Status:   Future     Standing Expiration Date:   9/13/2023             Miguelito Day MD    Agree with the ROS entered by the MA.

## 2022-09-13 NOTE — PROGRESS NOTES
Review of Systems   Constitutional:  Negative for chills, fatigue and fever. Respiratory:  Negative for cough, shortness of breath and wheezing. Cardiovascular:  Negative for chest pain. Neurological:  Positive for numbness. Negative for dizziness, light-headedness and headaches.         Numbness in right foot

## 2022-12-06 ENCOUNTER — OFFICE VISIT (OUTPATIENT)
Dept: NEUROSURGERY | Age: 72
End: 2022-12-06
Payer: MEDICARE

## 2022-12-06 VITALS
WEIGHT: 195 LBS | OXYGEN SATURATION: 99 % | TEMPERATURE: 98.1 F | HEIGHT: 69 IN | HEART RATE: 79 BPM | SYSTOLIC BLOOD PRESSURE: 139 MMHG | BODY MASS INDEX: 28.88 KG/M2 | DIASTOLIC BLOOD PRESSURE: 86 MMHG

## 2022-12-06 DIAGNOSIS — M79.671 FOOT PAIN, RIGHT: ICD-10-CM

## 2022-12-06 DIAGNOSIS — M54.16 RIGHT LUMBAR RADICULOPATHY: Primary | ICD-10-CM

## 2022-12-06 PROCEDURE — 1123F ACP DISCUSS/DSCN MKR DOCD: CPT | Performed by: NEUROLOGICAL SURGERY

## 2022-12-06 PROCEDURE — 3078F DIAST BP <80 MM HG: CPT | Performed by: NEUROLOGICAL SURGERY

## 2022-12-06 PROCEDURE — 99214 OFFICE O/P EST MOD 30 MIN: CPT | Performed by: NEUROLOGICAL SURGERY

## 2022-12-06 PROCEDURE — 3074F SYST BP LT 130 MM HG: CPT | Performed by: NEUROLOGICAL SURGERY

## 2022-12-06 NOTE — PROGRESS NOTES
Department of Neurosurgery                                                      Follow up visit      History Obtained From:  patient    CHIEF COMPLAINT:         Chief Complaint   Patient presents with    Follow-up     No PT completed; Pt states it was an extra $200 for evaluation. HISTORY OF PRESENT ILLNESS:       The patient is a 81 y.o. male who presents for follow up for actue lumbar radiculopathy, lumbar foraminal stenosis, and cervical spondylosis. Patient reports intermittent numbness and tingling of first toe of the right foot that occasionally radiates into other toes. Patient reports right foot pain, described as stepping on a rock and some right foot dragging worsening as the day progresses. Patient says the foot pain only occurs when he puts pressure on foot and causes trouble walking. Patient reports sitting down, rubbing foot out with a tennis ball, ibuprofen, and gabapentin relieves pain. Patient admits right foot weakness. Patient reports doing exercises for right foot and looking up exercise videos online, but he has not received PT in \"a while\". Patient reports he is no longer able to do as much yard work as he would like due foot pain and generalized fatigue. Patient denies any back pain. Patient denies right leg pain.     PAST MEDICAL HISTORY :       Past Medical History:        Diagnosis Date    Basal cell carcinoma     Erectile dysfunction     Hearing aid worn     both ears    Hypertension     Samuel Ornelas, CNP    Precancerous skin lesion     Squamous cell carcinoma        Past Surgical History:        Procedure Laterality Date    MALIGNANT SKIN LESION EXCISION      MOHS SURGERY      PRE-MALIGNANT / BENIGN SKIN LESION EXCISION      PROSTATE BIOPSY N/A 8/16/2021    FUSION PROSTATE BIOPSY AND ULTRASOUND performed by Debbie Sorensen MD at [address] [place] [address] History:   Social History     Socioeconomic History    Marital status:      Spouse name: Not on file    Number of children: Not on file    Years of education: Not on file    Highest education level: Not on file   Occupational History    Not on file   Tobacco Use    Smoking status: Never    Smokeless tobacco: Never   Substance and Sexual Activity    Alcohol use: Not Currently     Alcohol/week: 0.0 standard drinks     Comment: socially    Drug use: No    Sexual activity: Yes     Partners: Female   Other Topics Concern    Not on file   Social History Narrative    Not on file     Social Determinants of Health     Financial Resource Strain: Low Risk     Difficulty of Paying Living Expenses: Not hard at all   Food Insecurity: No Food Insecurity    Worried About Running Out of Food in the Last Year: Never true    Ran Out of Food in the Last Year: Never true   Transportation Needs: Not on file   Physical Activity: Inactive    Days of Exercise per Week: 0 days    Minutes of Exercise per Session: 0 min   Stress: Not on file   Social Connections: Not on file   Intimate Partner Violence: Not on file   Housing Stability: Not on file       Family History:       Problem Relation Age of Onset    High Blood Pressure Mother     Diabetes Neg Hx        Allergies:  Influenza vaccines    Home Medications:  Prior to Admission medications    Medication Sig Start Date End Date Taking? Authorizing Provider   lisinopril-hydroCHLOROthiazide (PRINZIDE;ZESTORETIC) 20-25 MG per tablet TAKE 1 TABLET DAILY 11/7/22  Yes TARA Reyes CNP   gabapentin (NEURONTIN) 100 MG capsule Take 1 capsule by mouth 3 times daily for 90 days.  8/18/22 12/6/22 Yes TARA Haskins CNP   NONFORMULARY Nitro Oxide Boost   Yes Historical Provider, MD   tamsulosin [facility]) 0.4 MG capsule Take 1 capsule by mouth daily 6/26/22  Yes Waldo Aschoff, MD   tadalafil (CIALIS) 5 MG tablet Take 1 tablet by mouth daily 3/8/22  Yes Waldo Aschoff, MD   Flaxseed, Linseed, (FLAXSEED OIL PO) Take 1,000 mLs by mouth   Yes Historical Provider, MD Red Yeast Rice 600 MG CAPS Take 1,200 mg by mouth 2 times daily 1/18/21  Yes Janneth Greek, APRN - CNP   Cholecalciferol (VITAMIN D3) 25 MCG (1000 UT) CAPS Take 1,000 Units by mouth daily  6/1/20  Yes Historical Provider, MD   Psyllium (METAMUCIL PO) Take by mouth daily    Yes Historical Provider, MD   Bioflavonoid Products (KATHRYN-C) 500-200-60 MG TABS Take 1 tablet by mouth daily   Yes Historical Provider, MD       Current Medications:   No current facility-administered medications for this visit. PHYSICAL EXAM:       /86 (Site: Right Upper Arm, Position: Sitting, Cuff Size: Large Adult)   Pulse 79   Temp 98.1 °F (36.7 °C) (Temporal)   Ht 5' 9\" (1.753 m)   Wt 195 lb (88.5 kg)   SpO2 99%   BMI 28.80 kg/m²   Physical Exam     Gen: NAD  HEENT: moist mucus membranes  Cardio: RRR  Pulm: chest rise symmetrically  GI: abd soft  Ext: no edema  Skin: warm    Neuro:    AOX3  CN 2-12 grossly intact  Speech articulate  Motor 5/5  No pronator drift  Sensation symmetrical   Right EHL 4+/5  Right medial aspect of great toe and plantar aspect of right foot decreased sensation compared to left  No Messer's sign  Tenderness to palpation to right sole      Radiology Review:    None new.       ASSESSMENT AND PLAN:       Patient Active Problem List   Diagnosis    Essential hypertension    Cervical spondylolysis    Neck pain    Coagulation defect ([facility] [address]    Spinal stenosis    Central stenosis of spinal canal    Urinary retention    Intractable pain    BPH (benign prostatic hyperplasia)    Lumbar radiculopathy, acute    Lumbar foraminal stenosis         A/P:  This is a 81 y.o. male with Right lumbar radiculopathy  -     EMG; Future  Foot pain, right  -     Mariah Montez DPM, Podiatry, [place]   Still have persistent medial foot numbness, and with weakness with foot dragging at end of day now with pain when walking with some tenderness to palpation    I thoroughly discussed details of diagnosis, natural histories of disease, and treatment options. We discussed surgical and non-surgical options, namely L4-5 hemilaminectomy and foraminotomy. I detailed the benefits, risks, recovery period, and alternatives of this surgery. I used the imaging to depict the surgery and diagnosis to the patient. He would like to defer surgery till March, in the mean time I made a referral to a podiatrist and EMG    Patient and/or family was counseled on the diagnosis and treatment plan    By signing my name below, I, Pratima Garg DO, attest that this documentation has been prepared under the direction and in the presence of Lamonte Velazquez DO. Electronically signed: Pratima Garg DO, [address] [address], 12/6/22     This note was created using voice recognition software. There may be inaccuracies of transcription  that are inadvertently overlooked prior to the signature. There is any questions about the transcription please contact me.

## 2023-01-06 ENCOUNTER — OFFICE VISIT (OUTPATIENT)
Dept: PODIATRY | Age: 73
End: 2023-01-06
Payer: MEDICARE

## 2023-01-06 VITALS — WEIGHT: 195 LBS | HEIGHT: 69 IN | BODY MASS INDEX: 28.88 KG/M2

## 2023-01-06 DIAGNOSIS — M79.671 PAIN IN RIGHT FOOT: ICD-10-CM

## 2023-01-06 DIAGNOSIS — G57.81 INTERDIGITAL NEUROMA OF RIGHT FOOT: Primary | ICD-10-CM

## 2023-01-06 PROCEDURE — 1036F TOBACCO NON-USER: CPT | Performed by: PODIATRIST

## 2023-01-06 PROCEDURE — G8417 CALC BMI ABV UP PARAM F/U: HCPCS | Performed by: PODIATRIST

## 2023-01-06 PROCEDURE — 1123F ACP DISCUSS/DSCN MKR DOCD: CPT | Performed by: PODIATRIST

## 2023-01-06 PROCEDURE — G8484 FLU IMMUNIZE NO ADMIN: HCPCS | Performed by: PODIATRIST

## 2023-01-06 PROCEDURE — 99203 OFFICE O/P NEW LOW 30 MIN: CPT | Performed by: PODIATRIST

## 2023-01-06 PROCEDURE — 3017F COLORECTAL CA SCREEN DOC REV: CPT | Performed by: PODIATRIST

## 2023-01-06 PROCEDURE — 64455 NJX AA&/STRD PLTR COM DG NRV: CPT | Performed by: PODIATRIST

## 2023-01-06 PROCEDURE — G8428 CUR MEDS NOT DOCUMENT: HCPCS | Performed by: PODIATRIST

## 2023-01-06 RX ORDER — BUPIVACAINE HYDROCHLORIDE 5 MG/ML
1 INJECTION, SOLUTION PERINEURAL ONCE
Status: COMPLETED | OUTPATIENT
Start: 2023-01-06 | End: 2023-01-06

## 2023-01-06 RX ADMIN — BUPIVACAINE HYDROCHLORIDE 5 MG: 5 INJECTION, SOLUTION PERINEURAL at 12:02

## 2023-01-06 ASSESSMENT — ENCOUNTER SYMPTOMS
SHORTNESS OF BREATH: 0
NAUSEA: 0
DIARRHEA: 0
BACK PAIN: 0
COLOR CHANGE: 0

## 2023-01-06 NOTE — PROGRESS NOTES
Claudia Puga is a 81 y.o. male who presents to the office today with chief complaint of pain to the ball of the right foot. Chief Complaint   Patient presents with    Foot Pain     Right foot pain, started with sciatica pain May 2022   Symptoms began about 8 month(s) ago. Patient denies injury to the right foot. Patient states that the right foot also gets numb and tingles on him. Patient states that the pain is greatest after being on his feet all day. Pain is rated 8 out of 10 at it's worst and is described as intermittent. Treatments prior to today's visit include: Patient has tried different shoes and inserts, padding, and a brace, but none of this has helped. Patient has also done physical therapy and a chiropractor without relief. Allergies   Allergen Reactions    Influenza Vaccines Swelling       Past Medical History:   Diagnosis Date    Basal cell carcinoma     Erectile dysfunction     Hearing aid worn     both ears    Hypertension     Jareth Boyd CNP    Precancerous skin lesion     Squamous cell carcinoma        Prior to Admission medications    Medication Sig Start Date End Date Taking? Authorizing Provider   lisinopril-hydroCHLOROthiazide (PRINZIDE;ZESTORETIC) 20-25 MG per tablet TAKE 1 TABLET DAILY 11/7/22   TARA Sevilla CNP   gabapentin (NEURONTIN) 100 MG capsule Take 1 capsule by mouth 3 times daily for 90 days.  8/18/22 12/6/22  TARA Whitt CNP   NONFORMULARY Nitro Oxide Boost    Historical Provider, MD   tamsulosin [facility]) 0.4 MG capsule Take 1 capsule by mouth daily 6/26/22   Jay Clancy MD   tadalafil (CIALIS) 5 MG tablet Take 1 tablet by mouth daily 3/8/22   Jay Clancy MD   Flaxseed, Linseed, (FLAXSEED OIL PO) Take 1,000 mLs by mouth    Historical Provider, MD   Red Yeast Rice 600 MG CAPS Take 1,200 mg by mouth 2 times daily 1/18/21   TARA Mojica CNP   Cholecalciferol (VITAMIN D3) 25 MCG (1000 UT) CAPS Take 1,000 Units by mouth daily  6/1/20 Historical Provider, MD   Psyllium (METAMUCIL PO) Take by mouth daily     Historical Provider, MD   Bioflavonoid Products (KATHRYN-C) 500-200-60 MG TABS Take 1 tablet by mouth daily    Historical Provider, MD       Past Surgical History:   Procedure Laterality Date    MALIGNANT SKIN LESION EXCISION      MOHS SURGERY      PRE-MALIGNANT / BENIGN SKIN LESION EXCISION      PROSTATE BIOPSY N/A 8/16/2021    FUSION PROSTATE BIOPSY AND ULTRASOUND performed by Salinas Hobbs MD at [address]         Family History   Problem Relation Age of Onset    High Blood Pressure Mother     Diabetes Neg Hx        Social History     Tobacco Use    Smoking status: Never    Smokeless tobacco: Never   Substance Use Topics    Alcohol use: Not Currently     Alcohol/week: 0.0 standard drinks     Comment: socially       Review of Systems   Constitutional:  Negative for activity change, appetite change, chills, diaphoresis, fatigue and fever. Respiratory:  Negative for shortness of breath. Cardiovascular:  Negative for leg swelling. Gastrointestinal:  Negative for diarrhea and nausea. Endocrine: Negative for cold intolerance, heat intolerance and polyuria. Musculoskeletal:  Positive for arthralgias. Negative for back pain, gait problem, joint swelling and myalgias. Skin:  Negative for color change, pallor, rash and wound. Allergic/Immunologic: Negative for environmental allergies and food allergies. Neurological:  Positive for numbness. Negative for dizziness, weakness and light-headedness. Hematological:  Does not bruise/bleed easily. Psychiatric/Behavioral:  Negative for behavioral problems, confusion and self-injury. The patient is not nervous/anxious. Vitals: There were no vitals filed for this visit. General: AAO x 3 in NAD. Integument: There are no rashes, ulcers, or breaks in the skin noted to the bilateral lower extremities.      There is no induration, subcutaneous nodules, or tightening of the skin noted to the bilateral.     Toenails 1-5 of the right foot do not present with thickness, elongation, discoloration, brittleness, subungual debris. Toenails 1-5 of the left foot do not present with thickness, elongation, discoloration, brittleness, subungual debris. Interdigital maceration absent to web spaces 1-4, Bilateral.     There are no preulcerative lesions noted to the right foot. There are no preulcerative lesions noted to the left foot. The skin to the bilateral feet is not thin and shiny. The skin to the bilateral feet is  warm, supple, and dry. Vascular: DP pulse of the right foot is  palpable. DP pulse of the left foot is  palpable. PT pulse of the right foot is  palpable. PT pulse of the left foot is  palpable. CFT is less than 3 secs to the digits of the right foot. CFT is less than 3 secs to the digits of the left foot. There is no edema noted to the bilateral foot or ankle. There is hair growth noted to the digits of the bilateral feet. There are no varicosities noted to the right foot/ankle. There are no varicosities noted to the left foot/ankle. Erythema is absent to the bilateral feet. Neurological: Reflexes are present to the right plantar foot and to the Achilles tendon. Reflexes are present to the left plantar foot and to the Achilles tendon. Epicritic sensation is  intact to the right foot. Epicritic sensation is  intact to the left foot. Musculoskeletal:  Muscle strength is +5/5 to all four muscle groups of the right lower extremity and +5/5 to all four muscle groups of the left lower extremity. There are no areas of subluxation, dislocation, or laxity noted to either lower extremity. Range of motion to the right ankle is  free of pain or grinding. Range of motion to the left ankle is  free of pain or grinding. Range of motion to the right subtalar joint is  free of pain or grinding. Range of motion to the left subtalar joint is  free of pain or grinding. No abnormalities, asymmetries, or misalignments are seen between the extremities. Weightbearing evaluation does reveal rearfoot eversion, medial prominence of the talar head, loss of the medial longitudinal arch height, and too many toes sign bilaterally. The lesser digits of the right foot are contracted. The lesser digits of the left foot are contracted. There is no prominence noted to the first metatarsal head without abduction of the hallux of the right foot. There is no prominence noted to the first metatarsal head without abduction of the hallux of the left foot. Shoe examination was performed. Biomechanical Exam: normal bilaterally. X-ray's taken: AP, Lateral, and Medial Oblique of the right foot. Findings: No fracture or stress fracture is noted. There is extremely close apposition of the second and third metatarsal heads. This is present moderately to the third and fourth metatarsal heads. Asessment: Patient is a 81 y.o. male with:    Diagnosis Orders   1. Interdigital neuroma of right foot        2. Pain in right foot  XR FOOT RIGHT (MIN 3 VIEWS)    bupivacaine (MARCAINE) 0.5 % injection 5 mg    triamcinolone acetonide (KENALOG) injection 10 mg    AL NJX AA&/STRD PLANTAR COMMON DIGITAL NERVES          Plan:  1. Clinical evaluation of the patient. 2. Patient informed that I believe he has a neuroma to the right second intermetatarsal space. Patient informed that while he also has pain to the right third intermetatarsal space, his pain is greater to the second. I recommended a steroid injection to the Right second intermetatarsal space. Verbal consent was obtained from the patient after all questions answered. The Right foot was prepped with an alcohol swab.  The injection was placed at the most tender area using a  1.0 cc total of a combination of 0.5 cc of 0.5% Marcaine plain and 0.5 cc of Kenalog 10mg/ml A band-aid was applied, patient advised of possible local steroid reaction symptoms, and patient instructed to limit activities to this area for 24 hours. Patient educated on conservative and surgical treatment options for this condition. 3. Contact office with any questions/problems/concerns. Return in about 2 weeks (around 1/20/2023) for Evaluation of neuroma.    1/6/2023      Betzaida Pena DPM

## 2023-01-11 ENCOUNTER — HOSPITAL ENCOUNTER (OUTPATIENT)
Dept: NEUROLOGY | Age: 73
Discharge: HOME | End: 2023-01-11
Payer: MEDICARE

## 2023-01-11 DIAGNOSIS — M54.16 RIGHT LUMBAR RADICULOPATHY: ICD-10-CM

## 2023-01-11 PROCEDURE — 95886 MUSC TEST DONE W/N TEST COMP: CPT | Performed by: PHYSICAL MEDICINE & REHABILITATION

## 2023-01-11 PROCEDURE — 95908 NRV CNDJ TST 3-4 STUDIES: CPT | Performed by: PHYSICAL MEDICINE & REHABILITATION

## 2023-01-20 ENCOUNTER — OFFICE VISIT (OUTPATIENT)
Dept: PODIATRY | Age: 73
End: 2023-01-20

## 2023-01-20 VITALS — WEIGHT: 195 LBS | BODY MASS INDEX: 28.88 KG/M2 | HEIGHT: 69 IN

## 2023-01-20 DIAGNOSIS — M79.671 PAIN IN RIGHT FOOT: ICD-10-CM

## 2023-01-20 DIAGNOSIS — G57.81 INTERDIGITAL NEUROMA OF RIGHT FOOT: Primary | ICD-10-CM

## 2023-01-20 RX ORDER — BUPIVACAINE HYDROCHLORIDE 5 MG/ML
1 INJECTION, SOLUTION PERINEURAL ONCE
Status: COMPLETED | OUTPATIENT
Start: 2023-01-20 | End: 2023-01-20

## 2023-01-20 RX ADMIN — BUPIVACAINE HYDROCHLORIDE 5 MG: 5 INJECTION, SOLUTION PERINEURAL at 11:46

## 2023-01-20 ASSESSMENT — ENCOUNTER SYMPTOMS
BACK PAIN: 0
COLOR CHANGE: 0
NAUSEA: 0
DIARRHEA: 0
SHORTNESS OF BREATH: 0

## 2023-01-20 NOTE — PROGRESS NOTES
[facility]  Return Patient Progress Note    Subjective: Lynne Dance 81 y.o. male that presents for follow up evaluation of neuroma to the right foot. Chief Complaint   Patient presents with    Foot Pain     Follow up right foot, not any better     Patient's treatment thus far has included steroid injection x 1, Power Step inserts. Pain is rated 7 out of 10 and is described as intermittent. Patient has been following my prescribed course of therapy as instructed. Review of Systems   Constitutional:  Negative for activity change, appetite change, chills, diaphoresis, fatigue and fever. Respiratory:  Negative for shortness of breath. Cardiovascular:  Negative for leg swelling. Gastrointestinal:  Negative for diarrhea and nausea. Endocrine: Negative for cold intolerance, heat intolerance and polyuria. Musculoskeletal:  Positive for arthralgias. Negative for back pain, gait problem, joint swelling and myalgias. Skin:  Negative for color change, pallor, rash and wound. Allergic/Immunologic: Negative for environmental allergies and food allergies. Neurological:  Negative for dizziness, weakness, light-headedness and numbness. Hematological:  Does not bruise/bleed easily. Psychiatric/Behavioral:  Negative for behavioral problems, confusion and self-injury. The patient is not nervous/anxious. Objective: Clinical evaluation of the patient reveals continued pain with palpation to the right second and third intermetatarsal spaces. This pain is greatest to the second intermetatarsal space. There is no palpable clicking noted, but this is difficult to ascertain due to the patient's pain. There is no divergence noted to the right second and third toes, nor to the right third and fourth toes. There is edema noted to the right foot. There is no erythema, calor, or open wound noted to the right foot. Assessment:    Diagnosis Orders   1.  Interdigital neuroma of right foot bupivacaine (MARCAINE) 0.5 % injection 5 mg    triamcinolone acetonide (KENALOG) injection 10 mg    MO NJX AA&/STRD PLANTAR COMMON DIGITAL NERVES      2. Pain in right foot  bupivacaine (MARCAINE) 0.5 % injection 5 mg    triamcinolone acetonide (KENALOG) injection 10 mg    MO NJX AA&/STRD PLANTAR COMMON DIGITAL NERVES            Plan: 1. Clinical evaluation of the patient. 2. I recommended a second steroid injection to the Right second intermetatarsal space. Verbal consent was obtained from the patient after all questions answered. The Right foot was prepped with an alcohol swab. The injection was placed at the most tender area using a  1.0 cc total of a combination of 0.5 cc of 0.5% Marcaine plain and 0.5 cc of Kenalog 10mg/ml. A band-aid was applied, patient advised of possible local steroid reaction symptoms, and patient instructed to limit activities to this area for 24 hours. Patient to continue with use of his Power Step inserts. Patient to follow up sooner if his pain worsens. 3. Return in about 4 weeks (around 2/17/2023) for Evaluation of neuroma.    1/20/2023      Nico Negro DPM

## 2023-02-09 ENCOUNTER — OFFICE VISIT (OUTPATIENT)
Dept: NEUROSURGERY | Age: 73
End: 2023-02-09
Payer: MEDICARE

## 2023-02-09 VITALS
OXYGEN SATURATION: 97 % | BODY MASS INDEX: 27.85 KG/M2 | SYSTOLIC BLOOD PRESSURE: 138 MMHG | HEIGHT: 69 IN | DIASTOLIC BLOOD PRESSURE: 80 MMHG | WEIGHT: 188 LBS | HEART RATE: 72 BPM

## 2023-02-09 DIAGNOSIS — M54.16 RIGHT LUMBAR RADICULOPATHY: Primary | ICD-10-CM

## 2023-02-09 DIAGNOSIS — M79.671 FOOT PAIN, RIGHT: ICD-10-CM

## 2023-02-09 PROCEDURE — 1123F ACP DISCUSS/DSCN MKR DOCD: CPT | Performed by: NEUROLOGICAL SURGERY

## 2023-02-09 PROCEDURE — 99214 OFFICE O/P EST MOD 30 MIN: CPT | Performed by: NEUROLOGICAL SURGERY

## 2023-02-09 PROCEDURE — 3079F DIAST BP 80-89 MM HG: CPT | Performed by: NEUROLOGICAL SURGERY

## 2023-02-09 PROCEDURE — 1036F TOBACCO NON-USER: CPT | Performed by: NEUROLOGICAL SURGERY

## 2023-02-09 PROCEDURE — G8417 CALC BMI ABV UP PARAM F/U: HCPCS | Performed by: NEUROLOGICAL SURGERY

## 2023-02-09 PROCEDURE — G8484 FLU IMMUNIZE NO ADMIN: HCPCS | Performed by: NEUROLOGICAL SURGERY

## 2023-02-09 PROCEDURE — 3075F SYST BP GE 130 - 139MM HG: CPT | Performed by: NEUROLOGICAL SURGERY

## 2023-02-09 PROCEDURE — G8427 DOCREV CUR MEDS BY ELIG CLIN: HCPCS | Performed by: NEUROLOGICAL SURGERY

## 2023-02-09 PROCEDURE — 3017F COLORECTAL CA SCREEN DOC REV: CPT | Performed by: NEUROLOGICAL SURGERY

## 2023-02-09 NOTE — PROGRESS NOTES
Department of Neurosurgery                                                      Follow up visit      History Obtained From:  patient    CHIEF COMPLAINT:         Chief Complaint   Patient presents with    Back Pain     Right lumbar radiculopathy       HISTORY OF PRESENT ILLNESS:       The patient is a 81 y.o. male who presents for follow up for right lumbar radiculopathy and right foot pain. Patient reports right foot numbness and pain and some right foot dragging   He also states intermittent numbness and tingling of first toe in his right foot.     He has also have pain in the arch of foot when applying pressure to right foot     PAST MEDICAL HISTORY :       Past Medical History:        Diagnosis Date    Basal cell carcinoma     Erectile dysfunction     Hearing aid worn     both ears    Hypertension     York Kallman, CNP    Precancerous skin lesion     Squamous cell carcinoma        Past Surgical History:        Procedure Laterality Date    MALIGNANT SKIN LESION EXCISION      MOHS SURGERY      PRE-MALIGNANT / BENIGN SKIN LESION EXCISION      PROSTATE BIOPSY N/A 8/16/2021    FUSION PROSTATE BIOPSY AND ULTRASOUND performed by Margaux Benitez MD at [address] [place] [address] History:   Social History     Socioeconomic History    Marital status:      Spouse name: Not on file    Number of children: Not on file    Years of education: Not on file    Highest education level: Not on file   Occupational History    Not on file   Tobacco Use    Smoking status: Never    Smokeless tobacco: Never   Substance and Sexual Activity    Alcohol use: Not Currently     Alcohol/week: 0.0 standard drinks     Comment: socially    Drug use: No    Sexual activity: Yes     Partners: Female   Other Topics Concern    Not on file   Social History Narrative    Not on file     Social Determinants of Health     Financial Resource Strain: Low Risk     Difficulty of Paying Living Expenses: Not hard at all   Food Insecurity: No Food Insecurity    Worried About Running Out of Food in the Last Year: Never true    Ran Out of Food in the Last Year: Never true   Transportation Needs: Unknown    Lack of Transportation (Medical): Not on file    Lack of Transportation (Non-Medical): No   Physical Activity: Inactive    Days of Exercise per Week: 0 days    Minutes of Exercise per Session: 0 min   Stress: Not on file   Social Connections: Not on file   Intimate Partner Violence: Not on file   Housing Stability: Unknown    Unable to Pay for Housing in the Last Year: Not on file    Number of Places Lived in the Last Year: Not on file    Unstable Housing in the Last Year: No       Family History:       Problem Relation Age of Onset    High Blood Pressure Mother     Diabetes Neg Hx        Allergies:  Influenza vaccines    Home Medications:  Prior to Admission medications    Medication Sig Start Date End Date Taking? Authorizing Provider   triamcinolone (KENALOG) 0.025 % cream Apply topically 2 times daily. 2/1/23  Yes TARA Lopez CNP   olmesartan-hydroCHLOROthiazide [facility] HCT) 40-25 MG per tablet Take 1 tablet by mouth daily 2/1/23  Yes TARA Lopez CNP   tamsulosin [facility]) 0.4 MG capsule Take 1 capsule by mouth daily 6/26/22  Yes Tiago Rosales MD   tadalafil (CIALIS) 5 MG tablet Take 1 tablet by mouth daily 3/8/22  Yes Tiago Rosales MD   Flaxseed, Linseed, (FLAXSEED OIL PO) Take 1,000 mLs by mouth   Yes Historical Provider, MD   Cholecalciferol (VITAMIN D3) 25 MCG (1000 UT) CAPS Take 1,000 Units by mouth daily  6/1/20  Yes Historical Provider, MD   Psyllium (METAMUCIL PO) Take by mouth daily    Yes Historical Provider, MD       Current Medications:   No current facility-administered medications for this visit.       PHYSICAL EXAM:       /80   Pulse 72   Ht 5' 9\" (1.753 m)   Wt 188 lb (85.3 kg)   SpO2 97%   BMI 27.76 kg/m²   Physical Exam     Gen: NAD  HEENT: moist mucus membranes  Cardio: RRR  Pulm: chest rise symmetrically  GI: abd soft  Ext: no edema  Skin: warm    Neuro:    AOX3  CN 2-12 grossly intact  Speech articulate  Motor 5/5  No pronator drift  Sensation symmetrical   Medial leg abnormal paresthesias numbness in left great toe on medial side   Right EHL is 4/5      Radiology Review:      EMG   1/11/2023  Official Read:  A chronic right L4-L5 radiculopathy with ongoing denervation noted. There is currently no electrophysiologic evidence of a significant plexopathy, myopathy, or peripheral polyneuropathy noted at this time. ASSESSMENT AND PLAN:       Patient Active Problem List   Diagnosis    Essential hypertension    Cervical spondylolysis    Neck pain    Coagulation defect ([address]    Spinal stenosis    Central stenosis of spinal canal    Urinary retention    Intractable pain    BPH (benign prostatic hyperplasia)    Lumbar radiculopathy, acute    Lumbar foraminal stenosis         A/P:  This is a 81 y.o. male with Right lumbar radiculopathy  Foot pain, right     I thoroughly discussed details of diagnosis, natural histories of disease, and treatment options. We discussed surgical option, namely L4-5 hemilaminectomy, medical facetectomy, and foraminotomy . I detailed the benefits, risks, recovery period, and alternatives of this surgery. I used the imaging to depict the surgery and diagnosis to the patient. Patient would like to proceed with surgery. Patient and/or family was counseled on the diagnosis and treatment plan    By signing my name below, I, Juan Carlos Marcos, attest that this documentation has been prepared under the direction and in the presence of Sarah Craft DO. Electronically signed: Johnny Vargas, 2/9/23     This note was created using voice recognition software. There may be inaccuracies of transcription  that are inadvertently overlooked prior to the signature. There is any questions about the transcription please contact me.

## 2023-02-17 ENCOUNTER — OFFICE VISIT (OUTPATIENT)
Dept: PODIATRY | Age: 73
End: 2023-02-17
Payer: MEDICARE

## 2023-02-17 VITALS
WEIGHT: 188 LBS | SYSTOLIC BLOOD PRESSURE: 155 MMHG | HEIGHT: 69 IN | DIASTOLIC BLOOD PRESSURE: 86 MMHG | BODY MASS INDEX: 27.85 KG/M2

## 2023-02-17 DIAGNOSIS — M79.671 PAIN IN RIGHT FOOT: ICD-10-CM

## 2023-02-17 DIAGNOSIS — G57.81 INTERDIGITAL NEUROMA OF RIGHT FOOT: Primary | ICD-10-CM

## 2023-02-17 PROCEDURE — 3079F DIAST BP 80-89 MM HG: CPT | Performed by: PODIATRIST

## 2023-02-17 PROCEDURE — G8427 DOCREV CUR MEDS BY ELIG CLIN: HCPCS | Performed by: PODIATRIST

## 2023-02-17 PROCEDURE — 1036F TOBACCO NON-USER: CPT | Performed by: PODIATRIST

## 2023-02-17 PROCEDURE — G8484 FLU IMMUNIZE NO ADMIN: HCPCS | Performed by: PODIATRIST

## 2023-02-17 PROCEDURE — 3017F COLORECTAL CA SCREEN DOC REV: CPT | Performed by: PODIATRIST

## 2023-02-17 PROCEDURE — 3077F SYST BP >= 140 MM HG: CPT | Performed by: PODIATRIST

## 2023-02-17 PROCEDURE — 99213 OFFICE O/P EST LOW 20 MIN: CPT | Performed by: PODIATRIST

## 2023-02-17 PROCEDURE — 1123F ACP DISCUSS/DSCN MKR DOCD: CPT | Performed by: PODIATRIST

## 2023-02-17 PROCEDURE — G8417 CALC BMI ABV UP PARAM F/U: HCPCS | Performed by: PODIATRIST

## 2023-02-17 ASSESSMENT — ENCOUNTER SYMPTOMS
DIARRHEA: 0
BACK PAIN: 0
COLOR CHANGE: 0
SHORTNESS OF BREATH: 0
NAUSEA: 0

## 2023-02-17 NOTE — PROGRESS NOTES
[facility]  Return Patient Progress Note    Subjective: Hayley Urban 81 y.o. male that presents for follow up evaluation of neuroma to the right foot. Chief Complaint   Patient presents with    Foot Pain     Follow up injection right foot, doing better     Patient's treatment thus far has included steroid injection x 2, Power Step inserts. Patient states that he goes to physical therapy and has been treated by the therapist for his foot pain as well. Patient states that he thinks this is helping. Pain is rated 3 out of 10 and is described as intermittent. Patient has been following my prescribed course of therapy as instructed. Review of Systems   Constitutional:  Negative for activity change, appetite change, chills, diaphoresis, fatigue and fever. Respiratory:  Negative for shortness of breath. Cardiovascular:  Negative for leg swelling. Gastrointestinal:  Negative for diarrhea and nausea. Endocrine: Negative for cold intolerance, heat intolerance and polyuria. Musculoskeletal:  Positive for arthralgias. Negative for back pain, gait problem, joint swelling and myalgias. Skin:  Negative for color change, pallor, rash and wound. Allergic/Immunologic: Negative for environmental allergies and food allergies. Neurological:  Negative for dizziness, weakness, light-headedness and numbness. Hematological:  Does not bruise/bleed easily. Psychiatric/Behavioral:  Negative for behavioral problems, confusion and self-injury. The patient is not nervous/anxious. Objective: Clinical evaluation of the patient reveals decreased pain with palpation to the right second and third intermetatarsal spaces. This pain remains greatest to the second intermetatarsal space. There is no palpable clicking noted. There is no divergence noted to the right second and third toes, nor to the right third and fourth toes. There remains mild edema noted to the right foot.  There is no erythema, calor, or open wound noted to the right foot. Assessment:    Diagnosis Orders   1. Interdigital neuroma of right foot        2. Pain in right foot              Plan: 1. Clinical evaluation of the patient. 2. Patient is encouraged to continue to wear his Power Step inserts. Patient may continue with physical therapy as long as this is helping. 3. Return in about 4 weeks (around 3/17/2023) for Evaluation of neuroma.    2/17/2023      William Matta DPM

## 2023-03-07 ENCOUNTER — HOSPITAL ENCOUNTER (OUTPATIENT)
Age: 73
Setting detail: SPECIMEN
Discharge: HOME | End: 2023-03-07

## 2023-03-07 DIAGNOSIS — R97.20 ELEVATED PSA: ICD-10-CM

## 2023-03-07 DIAGNOSIS — E78.2 MIXED HYPERLIPIDEMIA: ICD-10-CM

## 2023-03-07 DIAGNOSIS — G62.9 NEUROPATHY: ICD-10-CM

## 2023-03-07 DIAGNOSIS — R73.9 ELEVATED BLOOD SUGAR: ICD-10-CM

## 2023-03-07 DIAGNOSIS — E55.9 VITAMIN D INSUFFICIENCY: ICD-10-CM

## 2023-03-07 DIAGNOSIS — R53.83 LOW ENERGY: ICD-10-CM

## 2023-03-07 LAB
25(OH)D3 SERPL-MCNC: 60.3 NG/ML
FOLATE SERPL-MCNC: 11.2 NG/ML
HCT VFR BLD AUTO: 48.2 % (ref 40.7–50.3)
HGB BLD-MCNC: 15.3 G/DL (ref 13–17)
MCH RBC QN AUTO: 28.2 PG (ref 25.2–33.5)
MCHC RBC AUTO-ENTMCNC: 31.7 G/DL (ref 28.4–34.8)
MCV RBC AUTO: 88.9 FL (ref 82.6–102.9)
NRBC AUTOMATED: 0 PER 100 WBC
PDW BLD-RTO: 14.5 % (ref 11.8–14.4)
PLATELET # BLD AUTO: 136 K/UL (ref 138–453)
PMV BLD AUTO: 12.2 FL (ref 8.1–13.5)
PROSTATE SPECIFIC ANTIGEN: 8.88 NG/ML
RBC # BLD: 5.42 M/UL (ref 4.21–5.77)
TESTOST SERPL-MCNC: 592 NG/DL (ref 220–1000)
VIT B12 SERPL-MCNC: 528 PG/ML (ref 232–1245)
WBC # BLD AUTO: 5.2 K/UL (ref 3.5–11.3)

## 2023-03-08 LAB
ALBUMIN SERPL-MCNC: 4.6 G/DL (ref 3.5–5.2)
ALBUMIN/GLOBULIN RATIO: 1.7 (ref 1–2.5)
ALP SERPL-CCNC: 53 U/L (ref 40–129)
ALT SERPL-CCNC: 21 U/L (ref 5–41)
ANION GAP SERPL CALCULATED.3IONS-SCNC: 21 MMOL/L (ref 9–17)
AST SERPL-CCNC: 19 U/L
BILIRUB SERPL-MCNC: 0.4 MG/DL (ref 0.3–1.2)
BUN SERPL-MCNC: 17 MG/DL (ref 8–23)
CALCIUM SERPL-MCNC: 9.8 MG/DL (ref 8.6–10.4)
CHLORIDE SERPL-SCNC: 106 MMOL/L (ref 98–107)
CHOLEST SERPL-MCNC: 275 MG/DL
CHOLESTEROL/HDL RATIO: 5.6
CO2 SERPL-SCNC: 20 MMOL/L (ref 20–31)
CREAT SERPL-MCNC: 1.04 MG/DL (ref 0.7–1.2)
EST. AVERAGE GLUCOSE BLD GHB EST-MCNC: 117 MG/DL
GFR SERPL CREATININE-BSD FRML MDRD: >60 ML/MIN/1.73M2
GLUCOSE SERPL-MCNC: 136 MG/DL (ref 70–99)
HBA1C MFR BLD: 5.7 % (ref 4–6)
HDLC SERPL-MCNC: 49 MG/DL
LDLC SERPL CALC-MCNC: 203 MG/DL (ref 0–130)
POTASSIUM SERPL-SCNC: 5.1 MMOL/L (ref 3.7–5.3)
PROT SERPL-MCNC: 7.3 G/DL (ref 6.4–8.3)
SODIUM SERPL-SCNC: 147 MMOL/L (ref 135–144)
TRIGL SERPL-MCNC: 114 MG/DL

## 2023-03-14 ENCOUNTER — OFFICE VISIT (OUTPATIENT)
Dept: UROLOGY | Age: 73
End: 2023-03-14

## 2023-03-14 VITALS
HEIGHT: 69 IN | DIASTOLIC BLOOD PRESSURE: 76 MMHG | BODY MASS INDEX: 27.85 KG/M2 | WEIGHT: 188 LBS | HEART RATE: 80 BPM | SYSTOLIC BLOOD PRESSURE: 116 MMHG

## 2023-03-14 DIAGNOSIS — N13.8 BPH WITH OBSTRUCTION/LOWER URINARY TRACT SYMPTOMS: Primary | ICD-10-CM

## 2023-03-14 DIAGNOSIS — N52.9 ERECTILE DYSFUNCTION, UNSPECIFIED ERECTILE DYSFUNCTION TYPE: ICD-10-CM

## 2023-03-14 DIAGNOSIS — R97.20 ELEVATED PSA: ICD-10-CM

## 2023-03-14 DIAGNOSIS — N40.1 BPH WITH OBSTRUCTION/LOWER URINARY TRACT SYMPTOMS: Primary | ICD-10-CM

## 2023-03-14 DIAGNOSIS — R93.89 ABNORMAL MRI: ICD-10-CM

## 2023-03-14 ASSESSMENT — ENCOUNTER SYMPTOMS
EYE PAIN: 0
VOMITING: 0
GASTROINTESTINAL NEGATIVE: 1
ABDOMINAL PAIN: 0
COUGH: 0
WHEEZING: 0
BACK PAIN: 0
EYE REDNESS: 0
SHORTNESS OF BREATH: 0
CONSTIPATION: 0
EYES NEGATIVE: 1
NAUSEA: 0
RESPIRATORY NEGATIVE: 1
DIARRHEA: 0

## 2023-03-14 NOTE — LETTER
[address] [address]  Dept: [phone]  Dept Fax: [phone]        3/14/23    Patient: Vivek Donnelly  YOB: 1950    Dear TARA Baum CNP,    I had the pleasure of seeing one of your patients, Gray Laresn today in the office today. Below are the relevant portions of my assessment and plan of care. IMPRESSION:  1. BPH with obstruction/lower urinary tract symptoms    2. Elevated PSA    3. Abnormal MRI    4. Erectile dysfunction, unspecified erectile dysfunction type        PLAN:  He is doing very well. Continue Flomax and Cialis for BPH, as he is voiding much better. PSA is stable. F/U in 6 months with a PSA. Return in about 6 months (around 9/14/2023) for Labs. Prescriptions Ordered:  No orders of the defined types were placed in this encounter. Orders Placed:  Orders Placed This Encounter   Procedures    PSA, Diagnostic     Standing Status:   Future     Standing Expiration Date:   3/13/2024          Thank you for allowing me to participate in the care of this patient. I will keep you updated on this patient's follow up and I look forward to serving you and your patients again in the future.         Adenike Guerrero MD

## 2023-03-14 NOTE — PROGRESS NOTES
[address] [address]  Dept: [address] [facility] Urology Office Note - Established    Patient:  Susann Romberg  YOB: 1950  Date: 3/14/2023    The patient is a 81 y.o. male who presents todayfor evaluation of the following problems:   Chief Complaint   Patient presents with    Elevated PSA     6 month with PSA       HPI  He is here for elevated PSA. He had a targeted biopsy, which was negative. His PSA is 8.8, which has not been going up much in the last 6 years. He is on Flomax and is voiding much better. Cialis is working as well. Summary of old records: N/A    Additional History: N/A    Procedures Today: N/A    Urinalysis today:  No results found for this visit on 03/14/23. Last several PSA's:  Lab Results   Component Value Date    PSA 8.88 (H) 03/07/2023    PSA 8.16 (H) 08/24/2022    PSA 8.50 (H) 02/28/2022     Last total testosterone:  Lab Results   Component Value Date    TESTOSTERONE 592 03/07/2023       AUA Symptom Score (3/14/2023):                                Last BUN and creatinine:  Lab Results   Component Value Date    BUN 17 03/07/2023     Lab Results   Component Value Date    CREATININE 1.04 03/07/2023       Additional Lab/Culture results: none    Imaging Reviewed during this Office Visit: none  (results were independently reviewed by physician and radiology report verified)    PAST MEDICAL, FAMILY AND SOCIAL HISTORY UPDATE:  Past Medical History:   Diagnosis Date    Basal cell carcinoma     Erectile dysfunction     Hearing aid worn     both ears    Hypertension     Kimberly Go, CNP    Precancerous skin lesion     Squamous cell carcinoma      Past Surgical History:   Procedure Laterality Date    MALIGNANT SKIN LESION EXCISION      MOHS SURGERY      PRE-MALIGNANT / BENIGN SKIN LESION EXCISION      PROSTATE BIOPSY N/A 8/16/2021    FUSION PROSTATE BIOPSY AND ULTRASOUND performed by Aleksander Alvarado MD at [address] [facility]       Family History   Problem Relation Age of Onset    High Blood Pressure Mother     Diabetes Neg Hx      Outpatient Medications Marked as Taking for the 3/14/23 encounter (Office Visit) with Aleksander Alvarado MD   Medication Sig Dispense Refill    triamcinolone (KENALOG) 0.025 % cream Apply topically 2 times daily. 80 g 0    olmesartan-hydroCHLOROthiazide (BENICAR HCT) 40-25 MG per tablet Take 1 tablet by mouth daily 90 tablet 1    tamsulosin (FLOMAX) 0.4 MG capsule Take 1 capsule by mouth daily 90 capsule 3    tadalafil (CIALIS) 5 MG tablet Take 1 tablet by mouth daily 90 tablet 3    Flaxseed, Linseed, (FLAXSEED OIL PO) Take 1,000 mLs by mouth      Cholecalciferol (VITAMIN D3) 25 MCG (1000 UT) CAPS Take 1,000 Units by mouth daily       Psyllium (METAMUCIL PO) Take by mouth daily          Influenza vaccines  Social History     Tobacco Use   Smoking Status Never   Smokeless Tobacco Never     (Ifpatient a smoker, smoking cessation counseling offered)    Social History     Substance and Sexual Activity   Alcohol Use Not Currently    Alcohol/week: 0.0 standard drinks    Comment: socially       REVIEW OF SYSTEMS:  Review of Systems    Physical Exam:      Vitals:    03/14/23 1001   BP: 116/76   Pulse: 80     Body mass index is 27.76 kg/m². Patient is a 81 y.o. male in no acute distress and alert and oriented to person, place and time. Physical Exam  Constitutional: Patient in no acute distress. Neuro: Alert and oriented to person, place and time.   Psych: Mood normal, affect normal  Skin: No rash noted  HEENT: Head: Normocephalic andatraumatic  Conjunctivae and EOM are normal. Pupils are equal, round  Nose:Normal  Right External Ear: Normal; Left External Ear: Normal  Mouth: Mucosa Moist  Neck: Supple  Lungs: Respiratory effort is normal  Cardiovascular: Warm & Pink  Abdomen: Soft, non-tender, non-distended with no CVA,  No flank tenderness, Or hepatosplenomegaly   Lymphatics: No palpablelymphadenopathy. Bladder non-tender and not distended. Musculoskeletal: Normal gait and station  Testiscles: Normal, bilaterally  Prostate:    Assessment and Plan      1. BPH with obstruction/lower urinary tract symptoms    2. Elevated PSA    3. Abnormal MRI    4. Erectile dysfunction, unspecified erectile dysfunction type           Plan:         He is doing very well. Continue Flomax and Cialis for BPH, as he is voiding much better. PSA is stable. F/U in 6 months with a PSA. Return in about 6 months (around 9/14/2023) for Labs. Prescriptions Ordered:  No orders of the defined types were placed in this encounter. Orders Placed:  Orders Placed This Encounter   Procedures    PSA, Diagnostic     Standing Status:   Future     Standing Expiration Date:   3/13/2024             Tierra Kaye MD    Agree with the ROS entered by the MA.

## 2023-03-23 DIAGNOSIS — N52.9 ERECTILE DYSFUNCTION, UNSPECIFIED ERECTILE DYSFUNCTION TYPE: ICD-10-CM

## 2023-03-23 NOTE — TELEPHONE ENCOUNTER
Assessment and Plan:     Problem List Items Addressed This Visit        Other    Recurrent falls    Relevant Orders    Ambulatory referral to Physical Therapy (*VB)    Medicare annual wellness visit, initial     The patient presents today for Medicare initial annual wellness visit  All components of the examination were addressed in completed  We covered 5 wishes in detail and she appears motivated to complete this  Her immunizations are current  We provided anticipatory guidance  She has had no recent lipid profile nor blood sugar and we are going to obtain blood work today  She has been fasting for several hours  Additionally she has had 3 falls in the past year  It is unclear as well to whether this is due to tripping or balance disorder  She does have some mild weakness of her lower extremities which is not really focal   DTRs are normal   Romberg is negative  We are going to send her to physical therapy for strength training as well as balance therapy  She is in agreement this plan  Additionally she has not had mammography nor DEXA scan recently and we are going to have her have these performed as well  She is in agreement with this plan  Gait disturbance    Relevant Orders    Ambulatory referral to Physical Therapy (*VB)      Other Visit Diagnoses     Encounter for screening mammogram for breast cancer        Relevant Orders    Mammo screening bilateral w 3d & cad    Asymptomatic postmenopausal state        Relevant Orders    DXA bone density spine hip and pelvis           Preventive health issues were discussed with patient, and age appropriate screening tests were ordered as noted in patient's After Visit Summary  Personalized health advice and appropriate referrals for health education or preventive services given if needed, as noted in patient's After Visit Summary  History of Present Illness:   Recent viral illness resolved       Patient presents for [facility] Annual Wellness Lov:03/14/2023 visit    Patient Care Team:  Dom Odonnell MD as PCP - General  MD Michael Castillo DO     Problem List:     Patient Active Problem List   Diagnosis    GERD (gastroesophageal reflux disease)    History of asthma    Migraine headache    Vertigo    Hypertension    Cholelithiasis    Moderate mitral regurgitation    Allergic rhinitis    Benign paroxysmal positional vertigo    Radicular pain of right lower back    Viral syndrome    Medicare annual wellness visit, initial    Recurrent falls    Gait disturbance      Past Medical and Surgical History:     Past Medical History:   Diagnosis Date    Asthma     Cholelithiasis     GERD (gastroesophageal reflux disease)     Influenza 1/29/2019    Migraine headache     Pneumonia      Past Surgical History:   Procedure Laterality Date    NO PAST SURGERIES        Family History:     Family History   Problem Relation Age of Onset    Heart disease Mother     No Known Problems Father     No Known Problems Son       Social History:        Social History     Socioeconomic History    Marital status: /Civil Union     Spouse name: None    Number of children: None    Years of education: None    Highest education level: None   Occupational History    None   Social Needs    Financial resource strain: None    Food insecurity:     Worry: None     Inability: None    Transportation needs:     Medical: None     Non-medical: None   Tobacco Use    Smoking status: Former Smoker     Packs/day: 1 00     Years: 10 00     Pack years: 10 00    Smokeless tobacco: Never Used    Tobacco comment: quit age [age]   Substance and Sexual Activity    Alcohol use: No    Drug use: No    Sexual activity: None   Lifestyle    Physical activity:     Days per week: None     Minutes per session: None    Stress: None   Relationships    Social connections:     Talks on phone: None     Gets together: None     Attends Denominational service: None     Active member of club or organization: None     Attends meetings of clubs or organizations: None     Relationship status: None    Intimate partner violence:     Fear of current or ex partner: None     Emotionally abused: None     Physically abused: None     Forced sexual activity: None   Other Topics Concern    None   Social History Narrative    Lives with       Medications and Allergies:     Current Outpatient Medications   Medication Sig Dispense Refill    CRANBERRY PO Take by mouth 2 (two) times a day      Omega-3 Fatty Acids (FISH OIL) 1,000 mg Take 1,000 mg by mouth 2 (two) times a day       No current facility-administered medications for this visit  Allergies   Allergen Reactions    Bee Venom Anaphylaxis      Immunizations:     Immunization History   Administered Date(s) Administered    Influenza TIV (IM) 01/10/2017    Influenza, Quadrivalent (nasal) 01/09/2017    Influenza, high dose seasonal 0 5 mL 01/27/2020    Pneumococcal Conjugate 13-Valent 01/27/2020    Pneumococcal Polysaccharide PPV23 06/01/2016      Health Maintenance:         Topic Date Due    CRC Screening: Colonoscopy  07/18/2024         Topic Date Due    DTaP,Tdap,and Td Vaccines (1 - Tdap) 11/01/1952      Medicare Health Risk Assessment:     /78   Pulse 90   Temp 98 6 °F (37 °C)   Ht 5' 1" (1 549 m)   Wt 67 6 kg (149 lb)   SpO2 97%   BMI 28 15 kg/m²      Genia Castro is here for her Initial Wellness visit  Health Risk Assessment:   Patient rates overall health as fair  Patient feels that their physical health rating is same  Eyesight was rated as same  Hearing was rated as slightly worse  Patient feels that their emotional and mental health rating is same  Pain experienced in the last 7 days has been none  Depression Screening:   PHQ-2 Score: 0      Fall Risk Screening:    In the past year, patient has experienced: history of falling in past year    Number of falls: 2 or more  Injured during fall?: Yes    Feels unsteady when standing or walking?: Yes    Worried about falling?: Yes      Urinary Incontinence Screening:   Patient has leaked urine accidently in the last six months  Outside and felt they were trips though feels that she may have some balance issues  Leg weakness with prolonged standing  No history LB issue  Home Safety:  Patient has trouble with stairs inside or outside of their home  Patient has working smoke alarms and has working carbon monoxide detector  Home safety hazards include: not having non-slip bath and/or shower mats  Leg weakness  Nutrition:   Current diet is Regular and Frequent junk food  Medications:   Patient is currently taking over-the-counter supplements  OTC medications include: see medication list  Patient is able to manage medications  Activities of Daily Living (ADLs)/Instrumental Activities of Daily Living (IADLs):   Walk and transfer into and out of bed and chair?: Yes  Dress and groom yourself?: Yes    Bathe or shower yourself?: Yes    Feed yourself?  Yes  Do your laundry/housekeeping?: Yes  Manage your money, pay your bills and track your expenses?: Yes  Make your own meals?: Yes    Do your own shopping?: Yes    Durable Medical Equipment Suppliers  None    Previous Hospitalizations:   Any hospitalizations or ED visits within the last 12 months?: No      Advance Care Planning:   Living will: No    Durable POA for healthcare: No    Advanced directive: No    Five wishes given: Yes      Cognitive Screening:   Provider or family/friend/caregiver concerned regarding cognition?: No    PREVENTIVE SCREENINGS      Cardiovascular Screening:      Due for: Lipid Panel      Colorectal Cancer Screening:     General: Screening Current      Breast Cancer Screening:       Due for: Mammogram        Cervical Cancer Screening:    General: Screening Not Indicated      Osteoporosis Screening:      Due for: DXA Axial      Abdominal Aortic Aneurysm (AAA) Screening:        General: Screening Not Indicated      Lung Cancer Screening:     General: Screening Not Indicated      Hepatitis C Screening:    General: Screening Not Indicated      Miryam Mendez MD  Falls Plan of Care: Patient referred to physical therapy

## 2023-03-24 RX ORDER — TAMSULOSIN HYDROCHLORIDE 0.4 MG/1
CAPSULE ORAL
Qty: 180 CAPSULE | Refills: 3 | Status: SHIPPED | OUTPATIENT
Start: 2023-03-24

## 2023-03-27 RX ORDER — TADALAFIL 5 MG/1
TABLET ORAL
Qty: 90 TABLET | Refills: 0 | Status: SHIPPED | OUTPATIENT
Start: 2023-03-27

## 2023-03-27 NOTE — TELEPHONE ENCOUNTER
[facility] [place] is calling to request a refill on the following medication(s):    Last Visit Date (If Applicable):  6/10/3143    Next Visit Date:    9/26/2023    Medication Request:  Requested Prescriptions     Pending Prescriptions Disp Refills    tadalafil (CIALIS) 5 MG tablet [Pharmacy Med Name: Tadalafil Oral Tablet 5 MG] 90 tablet 0     Sig: TAKE 1 TABLET BY MOUTH EVERY DAY

## 2023-03-29 DIAGNOSIS — N52.9 ERECTILE DYSFUNCTION, UNSPECIFIED ERECTILE DYSFUNCTION TYPE: ICD-10-CM

## 2023-04-10 RX ORDER — TADALAFIL 5 MG/1
TABLET ORAL
Qty: 90 TABLET | Refills: 0 | OUTPATIENT
Start: 2023-04-10

## 2023-06-19 DIAGNOSIS — N52.9 ERECTILE DYSFUNCTION, UNSPECIFIED ERECTILE DYSFUNCTION TYPE: ICD-10-CM

## 2023-06-24 RX ORDER — TADALAFIL 5 MG/1
TABLET ORAL
Qty: 90 TABLET | Refills: 1 | Status: SHIPPED | OUTPATIENT
Start: 2023-06-24

## 2023-06-29 ENCOUNTER — TELEPHONE (OUTPATIENT)
Dept: UROLOGY | Age: 73
End: 2023-06-29

## 2023-09-19 ENCOUNTER — HOSPITAL ENCOUNTER (OUTPATIENT)
Age: 73
Setting detail: SPECIMEN
Discharge: HOME | End: 2023-09-19

## 2023-09-19 DIAGNOSIS — R97.20 ELEVATED PSA: ICD-10-CM

## 2023-09-19 LAB — PSA SERPL-MCNC: 9.01 NG/ML

## 2023-09-26 ENCOUNTER — OFFICE VISIT (OUTPATIENT)
Dept: UROLOGY | Age: 73
End: 2023-09-26
Payer: MEDICARE

## 2023-09-26 VITALS
HEIGHT: 69 IN | TEMPERATURE: 96.5 F | BODY MASS INDEX: 28.41 KG/M2 | RESPIRATION RATE: 16 BRPM | SYSTOLIC BLOOD PRESSURE: 136 MMHG | HEART RATE: 68 BPM | DIASTOLIC BLOOD PRESSURE: 78 MMHG | WEIGHT: 191.8 LBS | OXYGEN SATURATION: 97 %

## 2023-09-26 DIAGNOSIS — N52.9 ERECTILE DYSFUNCTION, UNSPECIFIED ERECTILE DYSFUNCTION TYPE: ICD-10-CM

## 2023-09-26 DIAGNOSIS — N13.8 BPH WITH OBSTRUCTION/LOWER URINARY TRACT SYMPTOMS: ICD-10-CM

## 2023-09-26 DIAGNOSIS — R97.20 ELEVATED PSA: Primary | ICD-10-CM

## 2023-09-26 DIAGNOSIS — N40.1 BPH WITH OBSTRUCTION/LOWER URINARY TRACT SYMPTOMS: ICD-10-CM

## 2023-09-26 PROCEDURE — G8427 DOCREV CUR MEDS BY ELIG CLIN: HCPCS | Performed by: UROLOGY

## 2023-09-26 PROCEDURE — G8417 CALC BMI ABV UP PARAM F/U: HCPCS | Performed by: UROLOGY

## 2023-09-26 PROCEDURE — 3075F SYST BP GE 130 - 139MM HG: CPT | Performed by: UROLOGY

## 2023-09-26 PROCEDURE — 99214 OFFICE O/P EST MOD 30 MIN: CPT | Performed by: UROLOGY

## 2023-09-26 PROCEDURE — 1123F ACP DISCUSS/DSCN MKR DOCD: CPT | Performed by: UROLOGY

## 2023-09-26 PROCEDURE — 3017F COLORECTAL CA SCREEN DOC REV: CPT | Performed by: UROLOGY

## 2023-09-26 PROCEDURE — 3078F DIAST BP <80 MM HG: CPT | Performed by: UROLOGY

## 2023-09-26 PROCEDURE — 1036F TOBACCO NON-USER: CPT | Performed by: UROLOGY

## 2023-09-26 RX ORDER — TADALAFIL 5 MG/1
5 TABLET ORAL DAILY
Qty: 90 TABLET | Refills: 3 | Status: SHIPPED | OUTPATIENT
Start: 2023-09-26

## 2023-09-26 RX ORDER — TADALAFIL 5 MG/1
5 TABLET ORAL DAILY
Qty: 90 TABLET | Refills: 1 | Status: CANCELLED | OUTPATIENT
Start: 2023-09-26

## 2023-09-26 ASSESSMENT — ENCOUNTER SYMPTOMS
WHEEZING: 0
VOMITING: 0
EYE REDNESS: 0
ABDOMINAL PAIN: 0
DIARRHEA: 0
SHORTNESS OF BREATH: 0
EYE PAIN: 0
BACK PAIN: 0
COUGH: 0
NAUSEA: 0
CONSTIPATION: 0

## 2023-09-26 NOTE — PROGRESS NOTES
[address]  [address] [facility] [address]  Dept: [address] [place] [address] Urology Office Note - Established    Patient:  Yulia Bejarano  YOB: 1950  Date: 9/26/2023    The patient is a 72 y.o. male who presents todayfor evaluation of the following problems:   Chief Complaint   Patient presents with    Benign Prostatic Hypertrophy     6 months w/PSA        HPI  He is here in follow up for elevated PSA. His PSA was 8.5 2 years ago, and targeted biopsy was negative. PSA today is 9. He is doing well with tadalafil. Summary of old records: N/A    Additional History: N/A    Procedures Today: N/A    Urinalysis today:  No results found for this visit on 09/26/23. Last several PSA's:  Lab Results   Component Value Date    PSA 9.01 (H) 09/19/2023    PSA 8.88 (H) 03/07/2023    PSA 8.16 (H) 08/24/2022     Last total testosterone:  Lab Results   Component Value Date    TESTOSTERONE 592 03/07/2023       AUA Symptom Score (9/26/2023):                                Last BUN and creatinine:  Lab Results   Component Value Date    BUN 17 03/07/2023     Lab Results   Component Value Date    CREATININE 1.04 03/07/2023       Additional Lab/Culture results: none    Imaging Reviewed during this Office Visit: none  (results were independently reviewed by physician and radiology report verified)    PAST MEDICAL, FAMILY AND SOCIAL HISTORY UPDATE:  Past Medical History:   Diagnosis Date    Basal cell carcinoma     Erectile dysfunction     Hearing aid worn     both ears    Hypertension     Qujosephine Lee, CNP    Precancerous skin lesion     Squamous cell carcinoma      Past Surgical History:   Procedure Laterality Date    MALIGNANT SKIN LESION EXCISION      MOHS SURGERY      PRE-MALIGNANT / BENIGN SKIN LESION EXCISION      PROSTATE BIOPSY N/A 8/16/2021    FUSION PROSTATE BIOPSY AND ULTRASOUND performed by Rebecca Gallegos MD at

## 2023-11-27 ENCOUNTER — HOSPITAL ENCOUNTER (OUTPATIENT)
Age: 73
Setting detail: SPECIMEN
Discharge: HOME | End: 2023-11-27

## 2023-11-27 DIAGNOSIS — I10 ESSENTIAL HYPERTENSION: ICD-10-CM

## 2023-11-27 DIAGNOSIS — E78.2 MIXED HYPERLIPIDEMIA: ICD-10-CM

## 2023-11-27 LAB
ALBUMIN SERPL-MCNC: 4.3 G/DL (ref 3.5–5.2)
ALBUMIN/GLOB SERPL: 1 {RATIO} (ref 1–2.5)
ALP SERPL-CCNC: 79 U/L (ref 40–129)
ALT SERPL-CCNC: 10 U/L (ref 10–50)
ANION GAP SERPL CALCULATED.3IONS-SCNC: 12 MMOL/L (ref 9–16)
AST SERPL-CCNC: 19 U/L (ref 10–50)
BASOPHILS # BLD: 0.07 K/UL (ref 0–0.2)
BASOPHILS NFR BLD: 1 % (ref 0–2)
BILIRUB SERPL-MCNC: 0.5 MG/DL (ref 0–1.2)
BUN SERPL-MCNC: 17 MG/DL (ref 8–23)
CALCIUM SERPL-MCNC: 9.6 MG/DL (ref 8.6–10.4)
CHLORIDE SERPL-SCNC: 101 MMOL/L (ref 98–107)
CHOLEST SERPL-MCNC: 247 MG/DL (ref 0–199)
CHOLESTEROL/HDL RATIO: 6
CO2 SERPL-SCNC: 26 MMOL/L (ref 20–31)
CREAT SERPL-MCNC: 0.9 MG/DL (ref 0.7–1.2)
EOSINOPHIL # BLD: 0.32 K/UL (ref 0–0.44)
EOSINOPHILS RELATIVE PERCENT: 4 % (ref 1–4)
ERYTHROCYTE [DISTWIDTH] IN BLOOD BY AUTOMATED COUNT: 13.8 % (ref 11.8–14.4)
GFR SERPL CREATININE-BSD FRML MDRD: >60 ML/MIN/1.73M2
GLUCOSE SERPL-MCNC: 124 MG/DL (ref 74–99)
HCT VFR BLD AUTO: 49.9 % (ref 40.7–50.3)
HDLC SERPL-MCNC: 41 MG/DL
HGB BLD-MCNC: 16.2 G/DL (ref 13–17)
IMM GRANULOCYTES # BLD AUTO: 0.06 K/UL (ref 0–0.3)
IMM GRANULOCYTES NFR BLD: 1 %
LDLC SERPL CALC-MCNC: 184 MG/DL (ref 0–100)
LYMPHOCYTES NFR BLD: 1.91 K/UL (ref 1.1–3.7)
LYMPHOCYTES RELATIVE PERCENT: 23 % (ref 24–43)
MCH RBC QN AUTO: 27.8 PG (ref 25.2–33.5)
MCHC RBC AUTO-ENTMCNC: 32.5 G/DL (ref 28.4–34.8)
MCV RBC AUTO: 85.6 FL (ref 82.6–102.9)
MONOCYTES NFR BLD: 0.59 K/UL (ref 0.1–1.2)
MONOCYTES NFR BLD: 7 % (ref 3–12)
NEUTROPHILS NFR BLD: 64 % (ref 36–65)
NEUTS SEG NFR BLD: 5.28 K/UL (ref 1.5–8.1)
NRBC BLD-RTO: 0 PER 100 WBC
PLATELET # BLD AUTO: 203 K/UL (ref 138–453)
PMV BLD AUTO: 10.8 FL (ref 8.1–13.5)
POTASSIUM SERPL-SCNC: 4.3 MMOL/L (ref 3.7–5.3)
PROT SERPL-MCNC: 7.6 G/DL (ref 6.6–8.7)
RBC # BLD AUTO: 5.83 M/UL (ref 4.21–5.77)
SODIUM SERPL-SCNC: 139 MMOL/L (ref 136–145)
TRIGL SERPL-MCNC: 111 MG/DL (ref 0–149)
VLDLC SERPL CALC-MCNC: 22 MG/DL
WBC OTHER # BLD: 8.2 K/UL (ref 3.5–11.3)

## 2024-01-10 ENCOUNTER — HOSPITAL ENCOUNTER (INPATIENT)
Age: 74
LOS: 1 days | Discharge: HOME | End: 2024-01-12
Attending: STUDENT IN AN ORGANIZED HEALTH CARE EDUCATION/TRAINING PROGRAM | Admitting: FAMILY MEDICINE
Payer: MEDICARE

## 2024-01-10 ENCOUNTER — APPOINTMENT (OUTPATIENT)
Dept: GENERAL RADIOLOGY | Age: 74
End: 2024-01-10
Payer: MEDICARE

## 2024-01-10 DIAGNOSIS — R07.9 CHEST PAIN, UNSPECIFIED TYPE: Primary | ICD-10-CM

## 2024-01-10 LAB
ANION GAP SERPL CALCULATED.3IONS-SCNC: 12 MMOL/L (ref 9–17)
BASOPHILS # BLD: 0 K/UL (ref 0–0.2)
BASOPHILS NFR BLD: 1 % (ref 0–2)
BUN SERPL-MCNC: 21 MG/DL (ref 8–23)
CALCIUM SERPL-MCNC: 9.1 MG/DL (ref 8.6–10.4)
CHLORIDE SERPL-SCNC: 102 MMOL/L (ref 98–107)
CO2 SERPL-SCNC: 25 MMOL/L (ref 20–31)
CREAT SERPL-MCNC: 1 MG/DL (ref 0.7–1.2)
D DIMER PPP FEU-MCNC: 0.39 UG/ML FEU (ref 0–0.59)
EOSINOPHIL # BLD: 0.1 K/UL (ref 0–0.4)
EOSINOPHILS RELATIVE PERCENT: 1 % (ref 0–4)
ERYTHROCYTE [DISTWIDTH] IN BLOOD BY AUTOMATED COUNT: 15.2 % (ref 11.5–14.9)
GFR SERPL CREATININE-BSD FRML MDRD: >60 ML/MIN/1.73M2
GLUCOSE SERPL-MCNC: 270 MG/DL (ref 70–99)
HCT VFR BLD AUTO: 39.4 % (ref 41–53)
HGB BLD-MCNC: 13.1 G/DL (ref 13.5–17.5)
LYMPHOCYTES NFR BLD: 1.3 K/UL (ref 1–4.8)
LYMPHOCYTES RELATIVE PERCENT: 21 % (ref 24–44)
MCH RBC QN AUTO: 27.5 PG (ref 26–34)
MCHC RBC AUTO-ENTMCNC: 33.2 G/DL (ref 31–37)
MCV RBC AUTO: 82.9 FL (ref 80–100)
MONOCYTES NFR BLD: 0.4 K/UL (ref 0.1–1.3)
MONOCYTES NFR BLD: 7 % (ref 1–7)
NEUTROPHILS NFR BLD: 70 % (ref 36–66)
NEUTS SEG NFR BLD: 4.3 K/UL (ref 1.3–9.1)
PLATELET # BLD AUTO: 161 K/UL (ref 150–450)
PMV BLD AUTO: 8.2 FL (ref 6–12)
POTASSIUM SERPL-SCNC: 3.8 MMOL/L (ref 3.7–5.3)
RBC # BLD AUTO: 4.75 M/UL (ref 4.5–5.9)
SODIUM SERPL-SCNC: 139 MMOL/L (ref 135–144)
TROPONIN I SERPL HS-MCNC: 14 NG/L (ref 0–22)
TROPONIN I SERPL HS-MCNC: 16 NG/L (ref 0–22)
WBC OTHER # BLD: 6.2 K/UL (ref 3.5–11)

## 2024-01-10 PROCEDURE — 71046 X-RAY EXAM CHEST 2 VIEWS: CPT

## 2024-01-10 PROCEDURE — 85025 COMPLETE CBC W/AUTO DIFF WBC: CPT

## 2024-01-10 PROCEDURE — 93005 ELECTROCARDIOGRAM TRACING: CPT

## 2024-01-10 PROCEDURE — 85379 FIBRIN DEGRADATION QUANT: CPT

## 2024-01-10 PROCEDURE — 80048 BASIC METABOLIC PNL TOTAL CA: CPT

## 2024-01-10 PROCEDURE — 36415 COLL VENOUS BLD VENIPUNCTURE: CPT

## 2024-01-10 PROCEDURE — 99285 EMERGENCY DEPT VISIT HI MDM: CPT

## 2024-01-10 PROCEDURE — 84484 ASSAY OF TROPONIN QUANT: CPT

## 2024-01-10 RX ORDER — ASPIRIN 81 MG/1
324 TABLET, CHEWABLE ORAL ONCE
Status: DISCONTINUED | OUTPATIENT
Start: 2024-01-10 | End: 2024-01-10

## 2024-01-10 ASSESSMENT — LIFESTYLE VARIABLES
HOW OFTEN DO YOU HAVE A DRINK CONTAINING ALCOHOL: NEVER
HOW MANY STANDARD DRINKS CONTAINING ALCOHOL DO YOU HAVE ON A TYPICAL DAY: PATIENT DOES NOT DRINK

## 2024-01-10 ASSESSMENT — PAIN DESCRIPTION - PAIN TYPE: TYPE: ACUTE PAIN

## 2024-01-10 ASSESSMENT — PAIN - FUNCTIONAL ASSESSMENT: PAIN_FUNCTIONAL_ASSESSMENT: 0-10

## 2024-01-10 ASSESSMENT — HEART SCORE: ECG: 1

## 2024-01-10 ASSESSMENT — PAIN SCALES - GENERAL: PAINLEVEL_OUTOF10: 7

## 2024-01-10 ASSESSMENT — PAIN DESCRIPTION - LOCATION: LOCATION: CHEST

## 2024-01-11 ENCOUNTER — APPOINTMENT (OUTPATIENT)
Age: 74
End: 2024-01-11
Attending: INTERNAL MEDICINE
Payer: MEDICARE

## 2024-01-11 PROBLEM — R07.9 CHEST PAIN: Status: ACTIVE | Noted: 2024-01-11

## 2024-01-11 PROBLEM — R94.31 ABNORMAL EKG: Status: ACTIVE | Noted: 2024-01-11

## 2024-01-11 LAB
CHOLEST SERPL-MCNC: 221 MG/DL
CHOLESTEROL/HDL RATIO: 4.4
ECHO AO ROOT DIAM: 3 CM
ECHO AO ROOT INDEX: 1.49 CM/M2
ECHO AV AREA PEAK VELOCITY: 1.9 CM2
ECHO AV AREA VTI: 2 CM2
ECHO AV AREA/BSA PEAK VELOCITY: 0.9 CM2/M2
ECHO AV AREA/BSA VTI: 1 CM2/M2
ECHO AV MEAN GRADIENT: 5 MMHG
ECHO AV MEAN VELOCITY: 1.1 M/S
ECHO AV PEAK GRADIENT: 10 MMHG
ECHO AV PEAK VELOCITY: 1.6 M/S
ECHO AV VELOCITY RATIO: 0.63
ECHO AV VTI: 34.2 CM
ECHO BSA: 2.05 M2
ECHO EST RA PRESSURE: 3 MMHG
ECHO LA AREA 2C: 24.1 CM2
ECHO LA AREA 4C: 21.1 CM2
ECHO LA DIAMETER INDEX: 1.83 CM/M2
ECHO LA DIAMETER: 3.7 CM
ECHO LA MAJOR AXIS: 5.4 CM
ECHO LA MINOR AXIS: 5.7 CM
ECHO LA TO AORTIC ROOT RATIO: 1.23
ECHO LA VOL BP: 75 ML (ref 18–58)
ECHO LA VOL MOD A2C: 80 ML (ref 18–58)
ECHO LA VOL MOD A4C: 66 ML (ref 18–58)
ECHO LA VOL/BSA BIPLANE: 37 ML/M2 (ref 16–34)
ECHO LA VOLUME INDEX MOD A2C: 40 ML/M2 (ref 16–34)
ECHO LA VOLUME INDEX MOD A4C: 33 ML/M2 (ref 16–34)
ECHO LV E' LATERAL VELOCITY: 12 CM/S
ECHO LV E' SEPTAL VELOCITY: 7 CM/S
ECHO LV EDV A2C: 103 ML
ECHO LV EDV A4C: 101 ML
ECHO LV EDV INDEX A4C: 50 ML/M2
ECHO LV EDV NDEX A2C: 51 ML/M2
ECHO LV EJECTION FRACTION A2C: 62 %
ECHO LV EJECTION FRACTION A4C: 63 %
ECHO LV EJECTION FRACTION BIPLANE: 63 % (ref 55–100)
ECHO LV ESV A2C: 40 ML
ECHO LV ESV A4C: 37 ML
ECHO LV ESV INDEX A2C: 20 ML/M2
ECHO LV ESV INDEX A4C: 18 ML/M2
ECHO LV FRACTIONAL SHORTENING: 26 % (ref 28–44)
ECHO LV INTERNAL DIMENSION DIASTOLE INDEX: 2.33 CM/M2
ECHO LV INTERNAL DIMENSION DIASTOLIC: 4.7 CM (ref 4.2–5.9)
ECHO LV INTERNAL DIMENSION SYSTOLIC INDEX: 1.73 CM/M2
ECHO LV INTERNAL DIMENSION SYSTOLIC: 3.5 CM
ECHO LV IVSD: 1.1 CM (ref 0.6–1)
ECHO LV MASS 2D: 187.5 G (ref 88–224)
ECHO LV MASS INDEX 2D: 92.8 G/M2 (ref 49–115)
ECHO LV POSTERIOR WALL DIASTOLIC: 1.1 CM (ref 0.6–1)
ECHO LV RELATIVE WALL THICKNESS RATIO: 0.47
ECHO LVOT AREA: 3.1 CM2
ECHO LVOT AV VTI INDEX: 0.65
ECHO LVOT DIAM: 2 CM
ECHO LVOT MEAN GRADIENT: 2 MMHG
ECHO LVOT PEAK GRADIENT: 4 MMHG
ECHO LVOT PEAK VELOCITY: 1 M/S
ECHO LVOT STROKE VOLUME INDEX: 34.4 ML/M2
ECHO LVOT SV: 69.4 ML
ECHO LVOT VTI: 22.1 CM
ECHO MV A VELOCITY: 0.94 M/S
ECHO MV AREA VTI: 2.3 CM2
ECHO MV E DECELERATION TIME (DT): 187 MS
ECHO MV E VELOCITY: 0.72 M/S
ECHO MV E/A RATIO: 0.77
ECHO MV E/E' LATERAL: 6
ECHO MV E/E' RATIO (AVERAGED): 8.14
ECHO MV LVOT VTI INDEX: 1.39
ECHO MV MAX VELOCITY: 1.1 M/S
ECHO MV MEAN GRADIENT: 1 MMHG
ECHO MV MEAN VELOCITY: 0.5 M/S
ECHO MV PEAK GRADIENT: 5 MMHG
ECHO MV VTI: 30.8 CM
ECHO RA AREA 4C: 14.7 CM2
ECHO RA END SYSTOLIC VOLUME APICAL 4 CHAMBER INDEX BSA: 17 ML/M2
ECHO RA VOLUME: 34 ML
ECHO RIGHT VENTRICULAR SYSTOLIC PRESSURE (RVSP): 15 MMHG
ECHO RV TAPSE: 2.3 CM (ref 1.7–?)
ECHO TV REGURGITANT MAX VELOCITY: 1.74 M/S
ECHO TV REGURGITANT PEAK GRADIENT: 12 MMHG
EKG ATRIAL RATE: 113 BPM
EKG P AXIS: 51 DEGREES
EKG P-R INTERVAL: 212 MS
EKG Q-T INTERVAL: 298 MS
EKG QRS DURATION: 94 MS
EKG QTC CALCULATION (BAZETT): 408 MS
EKG R AXIS: 41 DEGREES
EKG T AXIS: -49 DEGREES
EKG VENTRICULAR RATE: 113 BPM
ERYTHROCYTE [DISTWIDTH] IN BLOOD BY AUTOMATED COUNT: 15.1 % (ref 11.5–14.9)
HCT VFR BLD AUTO: 41.6 % (ref 41–53)
HDLC SERPL-MCNC: 50 MG/DL
HGB BLD-MCNC: 13.8 G/DL (ref 13.5–17.5)
LDLC SERPL CALC-MCNC: 157 MG/DL (ref 0–130)
MAGNESIUM SERPL-MCNC: 1.9 MG/DL (ref 1.6–2.6)
MCH RBC QN AUTO: 27.6 PG (ref 26–34)
MCHC RBC AUTO-ENTMCNC: 33.2 G/DL (ref 31–37)
MCV RBC AUTO: 83 FL (ref 80–100)
PLATELET # BLD AUTO: 162 K/UL (ref 150–450)
PMV BLD AUTO: 8.3 FL (ref 6–12)
RBC # BLD AUTO: 5.01 M/UL (ref 4.5–5.9)
TRIGL SERPL-MCNC: 69 MG/DL
WBC OTHER # BLD: 7.3 K/UL (ref 3.5–11)

## 2024-01-11 PROCEDURE — 93010 ELECTROCARDIOGRAM REPORT: CPT | Performed by: INTERNAL MEDICINE

## 2024-01-11 PROCEDURE — 36415 COLL VENOUS BLD VENIPUNCTURE: CPT

## 2024-01-11 PROCEDURE — 83735 ASSAY OF MAGNESIUM: CPT

## 2024-01-11 PROCEDURE — 80061 LIPID PANEL: CPT

## 2024-01-11 PROCEDURE — 2580000003 HC RX 258: Performed by: FAMILY MEDICINE

## 2024-01-11 PROCEDURE — 6370000000 HC RX 637 (ALT 250 FOR IP): Performed by: FAMILY MEDICINE

## 2024-01-11 PROCEDURE — 85027 COMPLETE CBC AUTOMATED: CPT

## 2024-01-11 PROCEDURE — 1200000000 HC SEMI PRIVATE

## 2024-01-11 PROCEDURE — 93306 TTE W/DOPPLER COMPLETE: CPT | Performed by: INTERNAL MEDICINE

## 2024-01-11 PROCEDURE — 93306 TTE W/DOPPLER COMPLETE: CPT

## 2024-01-11 PROCEDURE — 6360000002 HC RX W HCPCS: Performed by: FAMILY MEDICINE

## 2024-01-11 RX ORDER — POTASSIUM CHLORIDE 20 MEQ/1
40 TABLET, EXTENDED RELEASE ORAL PRN
Status: DISCONTINUED | OUTPATIENT
Start: 2024-01-11 | End: 2024-01-12 | Stop reason: HOSPADM

## 2024-01-11 RX ORDER — LOSARTAN POTASSIUM 50 MG/1
100 TABLET ORAL DAILY
Status: DISCONTINUED | OUTPATIENT
Start: 2024-01-11 | End: 2024-01-12 | Stop reason: HOSPADM

## 2024-01-11 RX ORDER — VITAMIN B COMPLEX
1000 TABLET ORAL DAILY
Status: DISCONTINUED | OUTPATIENT
Start: 2024-01-11 | End: 2024-01-12 | Stop reason: HOSPADM

## 2024-01-11 RX ORDER — HYDROCHLOROTHIAZIDE 25 MG/1
25 TABLET ORAL DAILY
Status: DISCONTINUED | OUTPATIENT
Start: 2024-01-11 | End: 2024-01-12 | Stop reason: HOSPADM

## 2024-01-11 RX ORDER — ACETAMINOPHEN 325 MG/1
650 TABLET ORAL EVERY 6 HOURS PRN
Status: DISCONTINUED | OUTPATIENT
Start: 2024-01-11 | End: 2024-01-12 | Stop reason: HOSPADM

## 2024-01-11 RX ORDER — ENOXAPARIN SODIUM 100 MG/ML
40 INJECTION SUBCUTANEOUS DAILY
Status: DISCONTINUED | OUTPATIENT
Start: 2024-01-11 | End: 2024-01-12 | Stop reason: HOSPADM

## 2024-01-11 RX ORDER — SODIUM CHLORIDE 0.9 % (FLUSH) 0.9 %
10 SYRINGE (ML) INJECTION PRN
Status: DISCONTINUED | OUTPATIENT
Start: 2024-01-11 | End: 2024-01-12 | Stop reason: HOSPADM

## 2024-01-11 RX ORDER — SODIUM CHLORIDE 9 MG/ML
INJECTION, SOLUTION INTRAVENOUS PRN
Status: DISCONTINUED | OUTPATIENT
Start: 2024-01-11 | End: 2024-01-12 | Stop reason: HOSPADM

## 2024-01-11 RX ORDER — TADALAFIL 5 MG/1
5 TABLET ORAL DAILY
Status: DISCONTINUED | OUTPATIENT
Start: 2024-01-11 | End: 2024-01-12 | Stop reason: RX

## 2024-01-11 RX ORDER — ONDANSETRON 4 MG/1
4 TABLET, ORALLY DISINTEGRATING ORAL EVERY 8 HOURS PRN
Status: DISCONTINUED | OUTPATIENT
Start: 2024-01-11 | End: 2024-01-12 | Stop reason: HOSPADM

## 2024-01-11 RX ORDER — POTASSIUM CHLORIDE 7.45 MG/ML
10 INJECTION INTRAVENOUS PRN
Status: DISCONTINUED | OUTPATIENT
Start: 2024-01-11 | End: 2024-01-12 | Stop reason: HOSPADM

## 2024-01-11 RX ORDER — ACETAMINOPHEN 650 MG/1
650 SUPPOSITORY RECTAL EVERY 6 HOURS PRN
Status: DISCONTINUED | OUTPATIENT
Start: 2024-01-11 | End: 2024-01-12 | Stop reason: HOSPADM

## 2024-01-11 RX ORDER — TAMSULOSIN HYDROCHLORIDE 0.4 MG/1
0.4 CAPSULE ORAL 2 TIMES DAILY
Status: DISCONTINUED | OUTPATIENT
Start: 2024-01-11 | End: 2024-01-12 | Stop reason: HOSPADM

## 2024-01-11 RX ORDER — PREDNISONE 20 MG/1
20 TABLET ORAL DAILY
Status: DISCONTINUED | OUTPATIENT
Start: 2024-01-11 | End: 2024-01-12 | Stop reason: HOSPADM

## 2024-01-11 RX ORDER — ASPIRIN 81 MG/1
81 TABLET, CHEWABLE ORAL DAILY
Status: DISCONTINUED | OUTPATIENT
Start: 2024-01-12 | End: 2024-01-12 | Stop reason: HOSPADM

## 2024-01-11 RX ORDER — TIZANIDINE 4 MG/1
4 TABLET ORAL 3 TIMES DAILY PRN
Status: DISCONTINUED | OUTPATIENT
Start: 2024-01-11 | End: 2024-01-12 | Stop reason: HOSPADM

## 2024-01-11 RX ORDER — MAGNESIUM SULFATE 1 G/100ML
1000 INJECTION INTRAVENOUS PRN
Status: DISCONTINUED | OUTPATIENT
Start: 2024-01-11 | End: 2024-01-12 | Stop reason: HOSPADM

## 2024-01-11 RX ORDER — NITROGLYCERIN 0.4 MG/1
0.4 TABLET SUBLINGUAL EVERY 5 MIN PRN
Status: DISCONTINUED | OUTPATIENT
Start: 2024-01-11 | End: 2024-01-12 | Stop reason: HOSPADM

## 2024-01-11 RX ORDER — SODIUM CHLORIDE 0.9 % (FLUSH) 0.9 %
5-40 SYRINGE (ML) INJECTION EVERY 12 HOURS SCHEDULED
Status: DISCONTINUED | OUTPATIENT
Start: 2024-01-11 | End: 2024-01-12 | Stop reason: HOSPADM

## 2024-01-11 RX ORDER — ATORVASTATIN CALCIUM 40 MG/1
40 TABLET, FILM COATED ORAL NIGHTLY
Status: DISCONTINUED | OUTPATIENT
Start: 2024-01-11 | End: 2024-01-12 | Stop reason: HOSPADM

## 2024-01-11 RX ORDER — TRIAMCINOLONE ACETONIDE 1 MG/G
CREAM TOPICAL 2 TIMES DAILY
Status: DISCONTINUED | OUTPATIENT
Start: 2024-01-11 | End: 2024-01-11

## 2024-01-11 RX ORDER — ONDANSETRON 2 MG/ML
4 INJECTION INTRAMUSCULAR; INTRAVENOUS EVERY 6 HOURS PRN
Status: DISCONTINUED | OUTPATIENT
Start: 2024-01-11 | End: 2024-01-12 | Stop reason: HOSPADM

## 2024-01-11 RX ORDER — OLMESARTAN MEDOXOMIL AND HYDROCHLOROTHIAZIDE 40/25 40; 25 MG/1; MG/1
1 TABLET ORAL DAILY
Status: DISCONTINUED | OUTPATIENT
Start: 2024-01-11 | End: 2024-01-11 | Stop reason: CLARIF

## 2024-01-11 RX ADMIN — LOSARTAN POTASSIUM 100 MG: 50 TABLET, FILM COATED ORAL at 08:35

## 2024-01-11 RX ADMIN — ACETAMINOPHEN 650 MG: 325 TABLET ORAL at 14:57

## 2024-01-11 RX ADMIN — PREDNISONE 20 MG: 20 TABLET ORAL at 08:35

## 2024-01-11 RX ADMIN — ACETAMINOPHEN 650 MG: 325 TABLET ORAL at 23:56

## 2024-01-11 RX ADMIN — Medication 1000 UNITS: at 08:35

## 2024-01-11 RX ADMIN — TAMSULOSIN HYDROCHLORIDE 0.4 MG: 0.4 CAPSULE ORAL at 21:30

## 2024-01-11 RX ADMIN — TIZANIDINE 4 MG: 4 TABLET ORAL at 08:36

## 2024-01-11 RX ADMIN — HYDROCHLOROTHIAZIDE 25 MG: 25 TABLET ORAL at 08:35

## 2024-01-11 RX ADMIN — TIZANIDINE 4 MG: 4 TABLET ORAL at 23:56

## 2024-01-11 RX ADMIN — ENOXAPARIN SODIUM 40 MG: 100 INJECTION SUBCUTANEOUS at 08:35

## 2024-01-11 RX ADMIN — SODIUM CHLORIDE, PRESERVATIVE FREE 10 ML: 5 INJECTION INTRAVENOUS at 09:00

## 2024-01-11 RX ADMIN — TAMSULOSIN HYDROCHLORIDE 0.4 MG: 0.4 CAPSULE ORAL at 08:35

## 2024-01-11 RX ADMIN — SODIUM CHLORIDE, PRESERVATIVE FREE 10 ML: 5 INJECTION INTRAVENOUS at 21:31

## 2024-01-11 ASSESSMENT — PAIN DESCRIPTION - ORIENTATION
ORIENTATION: RIGHT;LEFT

## 2024-01-11 ASSESSMENT — PAIN DESCRIPTION - DESCRIPTORS
DESCRIPTORS: ACHING
DESCRIPTORS: SHARP
DESCRIPTORS: SHARP

## 2024-01-11 ASSESSMENT — PAIN DESCRIPTION - LOCATION
LOCATION: SHOULDER
LOCATION: ARM
LOCATION: SHOULDER;ARM

## 2024-01-11 ASSESSMENT — PAIN SCALES - GENERAL
PAINLEVEL_OUTOF10: 6
PAINLEVEL_OUTOF10: 8
PAINLEVEL_OUTOF10: 5
PAINLEVEL_OUTOF10: 0

## 2024-01-11 ASSESSMENT — ENCOUNTER SYMPTOMS: SHORTNESS OF BREATH: 1

## 2024-01-11 NOTE — ED PROVIDER NOTES
[facility] ED  Emergency Department Encounter  Emergency Medicine Resident     Pt Name:Clark Morataya  MRN: [id number]  Birthdate 1950  Date of evaluation: 1/10/24  PCP:  Laurel Amezquita MD  Note Started: 9:46 PM EST      CHIEF COMPLAINT       Chief Complaint   Patient presents with    Chest Pain    Shortness of Breath       HISTORY OF PRESENT ILLNESS  (Location/Symptom, Timing/Onset, Context/Setting, Quality, Duration, Modifying Factors, Severity.)      Clark Morataya is a 73 y.o. male with history of hypertension who presents with chest pain.  Patient states that he started having chest pain around 830 this evening.  States it is left-sided and accompanied with shortness of breath and some palpitations.  Patient was brought in via EMS and received full dose aspirin.  Patient states he has a prior history of hypertension denies history of hyperlipidemia type 2 diabetes CAD or tobacco use.  Patient also states that he does not have a history of PE or DVT recent long distance travel or recent surgery in the past 4 weeks.  States he does have squamous cell skin cancer has not taking any radiation or chemotherapy for it.  States that he has never had this chest pain before his had recent left shoulder problems and goes to PT OT for it.  Has taken tizanidine on the medications for shoulder pain.  He states that this is different this is more in his chest.    PAST MEDICAL / SURGICAL / SOCIAL / FAMILY HISTORY      has a past medical history of Basal cell carcinoma, Erectile dysfunction, Hearing aid worn, Hypertension, Precancerous skin lesion, and Squamous cell carcinoma.       has a past surgical history that includes pre-malignant / benign skin lesion excision; Mohs surgery; malignant skin lesion excision; Tonsillectomy; and Prostate biopsy (N/A, 8/16/2021).      Social History     Socioeconomic History    Marital status:      Spouse name: Not on file    Number of children: Not on file

## 2024-01-11 NOTE — CARE COORDINATION
Case Management Assessment  Initial Evaluation    Date/Time of Evaluation: 1/11/2024 11:50 AM  Assessment Completed by: Digna Kenny RN    If patient is discharged prior to next notation, then this note serves as note for discharge by case management.    Patient Name: Clark Morataya                   YOB: 1950  Diagnosis: Chest pain [R07.9]  Chest pain, unspecified type [R07.9]                   Date / Time: 1/10/2024  9:45 PM    Patient Admission Status: Inpatient   Readmission Risk (Low < 19, Mod (19-27), High > 27): Readmission Risk Score: 5.4    Current PCP: Laurel Amezquita MD  PCP verified by CM? Yes    Chart Reviewed: Yes      History Provided by: Patient  Patient Orientation: Alert and Oriented    Patient Cognition: Alert    Hospitalization in the last 30 days (Readmission):  No    If yes, Readmission Assessment in CM Navigator will be completed.    Advance Directives:      Code Status: Full Code   Patient's Primary Decision Maker is:      Primary Decision Maker: Leandra Morataya - Spouse - [phone]    Discharge Planning:    Patient lives with: Spouse/Significant Other Type of Home: House  Primary Care Giver: Self  Patient Support Systems include: Spouse/Significant Other   Current Financial resources:    Current community resources: None  Current services prior to admission: None            Current DME:              Type of Home Care services:  None    ADLS  Prior functional level: Independent in ADLs/IADLs  Current functional level: Independent in ADLs/IADLs    PT AM-PAC:   /24  OT AM-PAC:   /24    Family can provide assistance at DC: Yes  Would you like Case Management to discuss the discharge plan with any other family members/significant others, and if so, who? No  Plans to Return to Present Housing: Yes  Other Identified Issues/Barriers to RETURNING to current housing: None  Potential Assistance needed at discharge: N/A            Potential DME:    Patient expects to

## 2024-01-11 NOTE — H&P
Family Medicine Admit Note    PCP: Laurel Amezquita MD    Date of Admission: 1/10/2024    Date of Service: Pt seen/examined on 1/11/24 and Admitted to Inpatient.    Chief Complaint:  Chest Pain      History Of Present Illness:   The patient is a 73 y.o. male who presents to [facility] with complaints of chest pain yesterday evening. He reports that it was on the left side and had some associated SOB and palpitations. He has a known hx of HTN and denies any hx of cardiac events. He denies any fevers, chills, cough, congestion, abdominal pain, N/V,D, dysuria, hematuria, headaches, dizziness or confusion.    Past Medical History:        Diagnosis Date    Basal cell carcinoma     Erectile dysfunction     Hearing aid worn     both ears    Hypertension     Juan Pablo Roblero CNP    Precancerous skin lesion     Squamous cell carcinoma        Past Surgical History:        Procedure Laterality Date    MALIGNANT SKIN LESION EXCISION      MOHS SURGERY      PRE-MALIGNANT / BENIGN SKIN LESION EXCISION      PROSTATE BIOPSY N/A 8/16/2021    FUSION PROSTATE BIOPSY AND ULTRASOUND performed by Carl Ang MD at [facility] OR    TONSILLECTOMY         Medications Prior to Admission:    Prior to Admission medications    Medication Sig Start Date End Date Taking? Authorizing Provider   tiZANidine (ZANAFLEX) 4 MG tablet Take 1 tablet by mouth 3 times daily as needed (muscle spasms) 1/9/24   Flavia Recio MD   predniSONE (DELTASONE) 20 MG tablet Take 1 tablet by mouth daily for 7 days 1/9/24 1/16/24  Flavia Recio MD   tadalafil (CIALIS) 5 MG tablet Take 1 tablet by mouth daily 9/26/23   Carl Ang MD   olmesartan-hydroCHLOROthiazide (BENICAR HCT) 40-25 MG per tablet TAKE 1 TABLET BY MOUTH DAILY 6/9/23   Margie Ruano APRN - CNP   tamsulosin (FLOMAX) 0.4 MG capsule TAKE 1 CAPSULE TWICE A DAY 3/24/23   Carl Ang MD   triamcinolone (KENALOG) 0.025 % cream Apply topically 2 times daily.  Patient not taking: Reported on

## 2024-01-11 NOTE — ACP (ADVANCE CARE PLANNING)
Advance Care Planning     Advance Care Planning Activator (Inpatient)  Conversation Note      Date of ACP Conversation: 1/11/2024     Conversation Conducted with: Patient with Decision Making Capacity    ACP Activator: Digna Kenny RN        Health Care Decision Maker:     Current Designated Health Care Decision Maker:     Primary Decision Maker: Leandra Morataya - [facility] - [phone]        Care Preferences    Ventilation:  \"If you were in your present state of health and suddenly became very ill and were unable to breathe on your own, what would your preference be about the use of a ventilator (breathing machine) if it were available to you?\"      Would the patient desire the use of ventilator (breathing machine)?: yes    \"If your health worsens and it becomes clear that your chance of recovery is unlikely, what would your preference be about the use of a ventilator (breathing machine) if it were available to you?\"     Would the patient desire the use of ventilator (breathing machine)?: \" I don't Know\"      Resuscitation  \"CPR works best to restart the heart when there is a sudden event, like a heart attack, in someone who is otherwise healthy. Unfortunately, CPR does not typically restart the heart for people who have serious health conditions or who are very sick.\"    \"In the event your heart stopped as a result of an underlying serious health condition, would you want attempts to be made to restart your heart (answer \"yes\" for attempt to resuscitate) or would you prefer a natural death (answer \"no\" for do not attempt to resuscitate)?\" yes       [] Yes   [] No   Educated Patient / Decision Maker regarding differences between Advance Directives and portable DNR orders.    Length of ACP Conversation in minutes:      Conversation Outcomes:  ACP discussion completed    Follow-up plan:    [] Schedule follow-up conversation to continue planning  [] Referred individual to Provider for additional

## 2024-01-11 NOTE — ED NOTES
Report given to JHONNY Muhammad from [facility].   Report method by phone   The following was reviewed with receiving RN:   Current vital signs:  BP (!) 144/83   Pulse 93   Temp 98.6 °F (37 °C) (Oral)   Resp 18   Ht 1.753 m (5' 9\")   Wt 86.2 kg (190 lb)   SpO2 94%   BMI 28.06 kg/m²                MEWS Score: 3     Any medication or safety alerts were reviewed. Any pending diagnostics and notifications were also reviewed, as well as any safety concerns or issues, abnormal labs, abnormal imaging, and abnormal assessment findings. Questions were answered.

## 2024-01-11 NOTE — ED PROVIDER NOTES
EMERGENCY DEPARTMENT ENCOUNTER   ATTENDING ATTESTATION     Pt Name: Clark Morataya  MRN: [id number]  Birthdate 1950  Date of evaluation: 1/10/24       Clark Morataya is a 73 y.o. male who presents with Chest Pain and Shortness of Breath    History of chronic pain     New chest pain tonight. Left sided. Pressure. With shortness of breath.     Plan is cardiac workup including d dimer    MDM:     Low risk for PE with a negative D-dimer and thus no further testing indicated for PE workup doubt PE at this time    2 negative cardiac enzymes but patient has a heart score of 5 will admit for further testing        Vitals:   Vitals:    01/10/24 2151 01/10/24 2232 01/10/24 2245 01/10/24 2300   BP: (!) 178/81 (!) 158/77 (!) 149/76 (!) 142/75   Pulse: (!) 112 (!) 110 (!) 109 (!) 108   Resp: 18 19 23 23   Temp: 98.6 °F (37 °C)      TempSrc: Oral      SpO2: 95% 94% 93% 93%   Weight: 86.2 kg (190 lb)      Height: 1.753 m (5' 9\")            I personally saw and examined the patient. I have reviewed and agree with the resident's findings, including all diagnostic interpretations and treatment plan as written. I was present for the key portions of any procedures performed and the inclusive time noted for any critical care statement.    Nick Oleary MD  Attending Emergency Physician           Nick Oleary MD  01/11/24 0001

## 2024-01-11 NOTE — CONSULTS
Luci, APRN - CNP   Cholecalciferol (VITAMIN D3) 25 MCG (1000 UT) CAPS Take 1,000 Units by mouth daily  6/1/20   ProviderNorbert MD   Psyllium (METAMUCIL PO) Take by mouth daily     Provider, MD Norbert       Current Medications: Scheduled Meds:   Vitamin D  1,000 Units Oral Daily    tamsulosin  0.4 mg Oral BID    tadalafil  5 mg Oral Daily    predniSONE  20 mg Oral Daily    sodium chloride flush  5-40 mL IntraVENous 2 times per day    atorvastatin  40 mg Oral Nightly    enoxaparin  40 mg SubCUTAneous Daily    [START ON 1/12/2024] aspirin  81 mg Oral Daily    losartan  100 mg Oral Daily    And    hydroCHLOROthiazide  25 mg Oral Daily     Continuous Infusions:   sodium chloride       PRN Meds:.tiZANidine, sodium chloride flush, sodium chloride, potassium chloride **OR** potassium alternative oral replacement **OR** potassium chloride, potassium chloride, magnesium sulfate, ondansetron **OR** ondansetron, acetaminophen **OR** acetaminophen, magnesium hydroxide, nitroGLYCERIN     Allergies:  Influenza vaccines    Social History:   reports that he has never smoked. He has never used smokeless tobacco. He reports that he does not currently use alcohol. He reports that he does not use drugs.     Family History: family history includes High Blood Pressure in his mother.    Review of Systems   CONSTITUTIONAL:  negative for fevers, chills, fatigue and malaise    EYES:  negative for discharge    HEENT:  negative for epistaxis and sore throat    RESPIRATORY:  negative for cough, shortness of breath, wheezing    CARDIOVASCULAR:  As above chest pain, palpitations, syncope, edema    GASTROINTESTINAL:  negative for nausea, vomiting, diarrhea, constipation, abdominal pain    GENITOURINARY:  negative for incontinence    MUSCULOSKELETAL:  Shoulder pains bilateral, restricted right shoulder to raise above the head   NEUROLOGICAL:  negative for headaches, seizures and double vision   PSYCHIATRIC:  negative                PHYSICAL EXAM:    Blood pressure (!) 162/89, pulse 74, temperature 97.9 °F (36.6 °C), resp. rate 16, height 1.753 m (5' 9\"), weight 86.2 kg (190 lb), SpO2 98 %.    CONSTITUTIONAL: AOx4, no apparent distress, appears stated age   HEAD: normocephalic, atraumatic   EYES: PERRLA, EOMI   ENT: moist mucous membranes, uvula midline   NECK:  symmetric, no midline tenderness to palpation   LUNGS: clear to auscultation bilaterally   CARDIOVASCULAR: regular rate and rhythm, no murmurs, rubs or gallops   ABDOMEN: Soft, non-tender, non-distended with normal active bowel sounds   SKIN: no rash       DATA:    ECG:NSR ist AV block, non-specific ST-T changes   Echo:  Stress:  Cath:    Labs:     CBC:   Recent Labs     01/10/24  2219 01/11/24  0705   WBC 6.2 7.3   HGB 13.1* 13.8   HCT 39.4* 41.6    162     BMP:   Recent Labs     01/10/24  2219      K 3.8   CO2 25   BUN 21   CREATININE 1.0   LABGLOM >60   GLUCOSE 270*     BNP: No results for input(s): \"BNP\" in the last 72 hours.  PT/INR: No results for input(s): \"PROTIME\", \"INR\" in the last 72 hours.  APTT:No results for input(s): \"APTT\" in the last 72 hours.  CARDIAC ENZYMES:No results for input(s): \"CKTOTAL\", \"CKMB\", \"CKMBINDEX\", \"TROPONINI\" in the last 72 hours.  FASTING LIPID PANEL:  Lab Results   Component Value Date/Time    HDL 50 01/11/2024 07:05 AM    TRIG 69 01/11/2024 07:05 AM     LIVER PROFILE:No results for input(s): \"AST\", \"ALT\", \"LABALBU\" in the last 72 hours.    Intake/Output Summary (Last 24 hours) at 1/11/2024 0856  Last data filed at 1/11/2024 0848  Gross per 24 hour   Intake 360 ml   Output --   Net 360 ml       IMPRESSION:    Patient Active Problem List   Diagnosis    Essential hypertension    Cervical spondylolysis    Neck pain    Coagulation defect (HCC)    Spinal stenosis    Central stenosis of spinal canal    Urinary retention    Intractable pain    BPH (benign prostatic hyperplasia)    Lumbar radiculopathy, acute    Lumbar foraminal stenosis

## 2024-01-12 ENCOUNTER — APPOINTMENT (OUTPATIENT)
Dept: NUCLEAR MEDICINE | Age: 74
End: 2024-01-12
Payer: MEDICARE

## 2024-01-12 ENCOUNTER — HOSPITAL ENCOUNTER (INPATIENT)
Age: 74
End: 2024-01-12
Payer: MEDICARE

## 2024-01-12 VITALS
BODY MASS INDEX: 28.14 KG/M2 | DIASTOLIC BLOOD PRESSURE: 73 MMHG | HEIGHT: 69 IN | HEART RATE: 73 BPM | RESPIRATION RATE: 16 BRPM | WEIGHT: 190 LBS | TEMPERATURE: 98.2 F | SYSTOLIC BLOOD PRESSURE: 163 MMHG | OXYGEN SATURATION: 94 %

## 2024-01-12 LAB
ECHO BSA: 2.05 M2
STRESS BASELINE DIAS BP: 84 MMHG
STRESS BASELINE HR: 87 BPM
STRESS BASELINE SYS BP: 158 MMHG
STRESS ESTIMATED WORKLOAD: 1 METS
STRESS PEAK DIAS BP: 86 MMHG
STRESS PEAK SYS BP: 185 MMHG
STRESS PERCENT HR ACHIEVED: 74 %
STRESS POST PEAK HR: 110 BPM
STRESS RATE PRESSURE PRODUCT: NORMAL BPM*MMHG
STRESS STAGE RECOVERY 1 BP: NORMAL MMHG
STRESS STAGE RECOVERY 1 DURATION: 1 MIN:SEC
STRESS STAGE RECOVERY 1 HR: 110 BPM
STRESS STAGE RECOVERY 2 BP: NORMAL MMHG
STRESS STAGE RECOVERY 2 DURATION: 6 MIN:SEC
STRESS STAGE RECOVERY 2 HR: 97 BPM
STRESS TARGET HR: 149 BPM

## 2024-01-12 PROCEDURE — 6360000002 HC RX W HCPCS: Performed by: INTERNAL MEDICINE

## 2024-01-12 PROCEDURE — 78452 HT MUSCLE IMAGE SPECT MULT: CPT

## 2024-01-12 PROCEDURE — A9500 TC99M SESTAMIBI: HCPCS | Performed by: INTERNAL MEDICINE

## 2024-01-12 PROCEDURE — 93017 CV STRESS TEST TRACING ONLY: CPT

## 2024-01-12 PROCEDURE — 6370000000 HC RX 637 (ALT 250 FOR IP): Performed by: FAMILY MEDICINE

## 2024-01-12 PROCEDURE — 99233 SBSQ HOSP IP/OBS HIGH 50: CPT | Performed by: NURSE PRACTITIONER

## 2024-01-12 PROCEDURE — 2580000003 HC RX 258: Performed by: INTERNAL MEDICINE

## 2024-01-12 PROCEDURE — 6360000002 HC RX W HCPCS: Performed by: FAMILY MEDICINE

## 2024-01-12 PROCEDURE — 3430000000 HC RX DIAGNOSTIC RADIOPHARMACEUTICAL: Performed by: INTERNAL MEDICINE

## 2024-01-12 RX ORDER — ATROPINE SULFATE 0.1 MG/ML
0.5 INJECTION INTRAVENOUS EVERY 5 MIN PRN
Status: ACTIVE | OUTPATIENT
Start: 2024-01-12 | End: 2024-01-12

## 2024-01-12 RX ORDER — NITROGLYCERIN 0.4 MG/1
0.4 TABLET SUBLINGUAL EVERY 5 MIN PRN
Qty: 25 TABLET | Refills: 0 | Status: SHIPPED | OUTPATIENT
Start: 2024-01-12

## 2024-01-12 RX ORDER — SODIUM CHLORIDE 9 MG/ML
500 INJECTION, SOLUTION INTRAVENOUS CONTINUOUS PRN
Status: ACTIVE | OUTPATIENT
Start: 2024-01-12 | End: 2024-01-12

## 2024-01-12 RX ORDER — ATORVASTATIN CALCIUM 40 MG/1
40 TABLET, FILM COATED ORAL NIGHTLY
Qty: 30 TABLET | Refills: 3 | Status: SHIPPED | OUTPATIENT
Start: 2024-01-12

## 2024-01-12 RX ORDER — SODIUM CHLORIDE 0.9 % (FLUSH) 0.9 %
5-40 SYRINGE (ML) INJECTION PRN
Status: ACTIVE | OUTPATIENT
Start: 2024-01-12 | End: 2024-01-12

## 2024-01-12 RX ORDER — SODIUM CHLORIDE 0.9 % (FLUSH) 0.9 %
10 SYRINGE (ML) INJECTION PRN
Status: DISCONTINUED | OUTPATIENT
Start: 2024-01-12 | End: 2024-01-12 | Stop reason: HOSPADM

## 2024-01-12 RX ORDER — ALBUTEROL SULFATE 90 UG/1
2 AEROSOL, METERED RESPIRATORY (INHALATION) PRN
Status: ACTIVE | OUTPATIENT
Start: 2024-01-12 | End: 2024-01-12

## 2024-01-12 RX ORDER — ASPIRIN 81 MG/1
81 TABLET, CHEWABLE ORAL DAILY
Qty: 30 TABLET | Refills: 3 | Status: SHIPPED | OUTPATIENT
Start: 2024-01-12

## 2024-01-12 RX ORDER — METOPROLOL TARTRATE 1 MG/ML
5 INJECTION, SOLUTION INTRAVENOUS EVERY 5 MIN PRN
Status: ACTIVE | OUTPATIENT
Start: 2024-01-12 | End: 2024-01-12

## 2024-01-12 RX ORDER — TETRAKIS(2-METHOXYISOBUTYLISOCYANIDE)COPPER(I) TETRAFLUOROBORATE 1 MG/ML
35 INJECTION, POWDER, LYOPHILIZED, FOR SOLUTION INTRAVENOUS
Status: COMPLETED | OUTPATIENT
Start: 2024-01-12 | End: 2024-01-12

## 2024-01-12 RX ORDER — REGADENOSON 0.08 MG/ML
0.4 INJECTION, SOLUTION INTRAVENOUS
Status: COMPLETED | OUTPATIENT
Start: 2024-01-12 | End: 2024-01-12

## 2024-01-12 RX ORDER — NITROGLYCERIN 0.4 MG/1
0.4 TABLET SUBLINGUAL EVERY 5 MIN PRN
Status: ACTIVE | OUTPATIENT
Start: 2024-01-12 | End: 2024-01-12

## 2024-01-12 RX ORDER — AMINOPHYLLINE 25 MG/ML
50 INJECTION, SOLUTION INTRAVENOUS PRN
Status: ACTIVE | OUTPATIENT
Start: 2024-01-12 | End: 2024-01-12

## 2024-01-12 RX ORDER — TETRAKIS(2-METHOXYISOBUTYLISOCYANIDE)COPPER(I) TETRAFLUOROBORATE 1 MG/ML
15 INJECTION, POWDER, LYOPHILIZED, FOR SOLUTION INTRAVENOUS
Status: COMPLETED | OUTPATIENT
Start: 2024-01-12 | End: 2024-01-12

## 2024-01-12 RX ADMIN — LOSARTAN POTASSIUM 100 MG: 50 TABLET, FILM COATED ORAL at 10:36

## 2024-01-12 RX ADMIN — SODIUM CHLORIDE, PRESERVATIVE FREE 10 ML: 5 INJECTION INTRAVENOUS at 12:00

## 2024-01-12 RX ADMIN — Medication 1000 UNITS: at 10:36

## 2024-01-12 RX ADMIN — Medication 15 MILLICURIE: at 08:52

## 2024-01-12 RX ADMIN — ENOXAPARIN SODIUM 40 MG: 100 INJECTION SUBCUTANEOUS at 10:36

## 2024-01-12 RX ADMIN — ASPIRIN 81 MG: 81 TABLET, CHEWABLE ORAL at 10:35

## 2024-01-12 RX ADMIN — HYDROCHLOROTHIAZIDE 25 MG: 25 TABLET ORAL at 10:35

## 2024-01-12 RX ADMIN — ACETAMINOPHEN 650 MG: 325 TABLET ORAL at 10:35

## 2024-01-12 RX ADMIN — TAMSULOSIN HYDROCHLORIDE 0.4 MG: 0.4 CAPSULE ORAL at 10:35

## 2024-01-12 RX ADMIN — REGADENOSON 0.4 MG: 0.08 INJECTION, SOLUTION INTRAVENOUS at 08:51

## 2024-01-12 RX ADMIN — Medication 44.4 MILLICURIE: at 11:59

## 2024-01-12 RX ADMIN — PREDNISONE 20 MG: 20 TABLET ORAL at 10:35

## 2024-01-12 RX ADMIN — ACETAMINOPHEN 650 MG: 325 TABLET ORAL at 05:50

## 2024-01-12 ASSESSMENT — PAIN DESCRIPTION - DESCRIPTORS: DESCRIPTORS: STABBING

## 2024-01-12 ASSESSMENT — PAIN SCALES - GENERAL
PAINLEVEL_OUTOF10: 7
PAINLEVEL_OUTOF10: 9

## 2024-01-12 ASSESSMENT — PAIN DESCRIPTION - LOCATION: LOCATION: SHOULDER

## 2024-01-12 ASSESSMENT — PAIN DESCRIPTION - ORIENTATION: ORIENTATION: RIGHT;LEFT

## 2024-01-12 NOTE — CARE COORDINATION
ONGOING DISCHARGE PLAN:    Patient is alert and oriented x4.    Spoke with patient regarding discharge plan and patient confirms that plan is still home without needs. Declined VNS.    Stress test today.    Will continue to follow for additional discharge needs.    If patient is discharged prior to next notation, then this note serves as note for discharge by case management.    Electronically signed by Susy Wu RN on 1/12/2024 at 2:32 PM

## 2024-01-12 NOTE — PLAN OF CARE
Problem: Discharge Planning  Goal: Discharge to home or other facility with appropriate resources  Outcome: Progressing  Flowsheets (Taken 1/11/2024 2000)  Discharge to home or other facility with appropriate resources: Identify barriers to discharge with patient and caregiver     Problem: Pain  Goal: Verbalizes/displays adequate comfort level or baseline comfort level  Outcome: Progressing  Flowsheets (Taken 1/11/2024 2000)  Verbalizes/displays adequate comfort level or baseline comfort level: Encourage patient to monitor pain and request assistance     Problem: Safety - Adult  Goal: Free from fall injury  Outcome: Progressing  Flowsheets (Taken 1/12/2024 0337)  Free From Fall Injury: Instruct family/caregiver on patient safety

## 2024-01-12 NOTE — PROGRESS NOTES
FAMILY MEDICINE  - PROGRESS NOTE    Date:  1/12/2024  Clark Morataya  [id number]      Chief Complaint   Patient presents with    Chest Pain    Shortness of Breath         Interval History:  not changed, he had an abnormal EKG with non-specific changes and atypical chest pain. He has not had any cardiac work up previously. He will have a stress test this am. No significant events overnight. Specialists notes, labs & imaging reviewed.      Subjective  Constitutional: positive for overweight  Cardiovascular: positive for chest pain  Genitourinary:positive for enlarged prostate  Musculoskeletal:positive for arthralgias, back pain, and myalgias:    Objective:    BP (!) 163/73   Pulse 73   Temp 98.2 °F (36.8 °C)   Resp 16   Ht 1.753 m (5' 9\")   Wt 86.2 kg (190 lb)   SpO2 94%   BMI 28.06 kg/m²   General appearance - alert, well appearing, and in no distress and overweight  Mental status - alert, oriented to person, place, and time  Eyes - pupils equal and reactive, extraocular eye movements intact  Ears - hearing grossly normal bilaterally  Nose - normal and patent, no erythema, discharge or polyps  Mouth - mucous membranes moist, pharynx normal without lesions  Neck - supple, no significant adenopathy  Lymphatics - no palpable lymphadenopathy, no hepatosplenomegaly  Chest - clear to auscultation, no wheezes, rales or rhonchi, symmetric air entry  Heart - normal rate, regular rhythm, normal S1, S2, no murmurs, rubs, clicks or gallops  Abdomen - soft, nontender, nondistended, no masses or organomegaly  Breasts - not examined  Back exam - not examined  Neurological - alert, oriented, normal speech, no focal findings or movement disorder noted  Musculoskeletal - no joint tenderness, deformity or swelling  Extremities - peripheral pulses normal, no pedal edema, no clubbing or cyanosis  Skin - normal coloration and turgor, no rashes, no suspicious skin lesions  week.  -Complete orders per chart.    See orders   Disposition:    Electronically signed by Flavia Recio MD on 1/12/2024 at 7:54 AM

## 2024-01-12 NOTE — PROGRESS NOTES
[facility]   Progress Note                   Date:   1/12/2024  Patient name: Clark Morataya  Date of admission:  1/10/2024  9:45 PM  MRN:   [id number]  YOB: 1950  PCP: Laurel Amezquita MD    Reason for Admission: Chest pain [R07.9]  Chest pain, unspecified type [R07.9]    Subjective:       Clinical Changes / Abnormalities: Pt seen and examined in the stress lab. Pt denies any CP or sob.  Pt states that he is ready to go home       Medications:   Scheduled Meds:   Vitamin D  1,000 Units Oral Daily    tamsulosin  0.4 mg Oral BID    tadalafil  5 mg Oral Daily    predniSONE  20 mg Oral Daily    sodium chloride flush  5-40 mL IntraVENous 2 times per day    atorvastatin  40 mg Oral Nightly    enoxaparin  40 mg SubCUTAneous Daily    aspirin  81 mg Oral Daily    losartan  100 mg Oral Daily    And    hydroCHLOROthiazide  25 mg Oral Daily     Continuous Infusions:   sodium chloride       CBC:   Recent Labs     01/10/24  2219 01/11/24  0705   WBC 6.2 7.3   HGB 13.1* 13.8    162     BMP:    Recent Labs     01/10/24  2219      K 3.8      CO2 25   BUN 21   CREATININE 1.0   GLUCOSE 270*     Hepatic: No results for input(s): \"AST\", \"ALT\", \"ALB\", \"BILITOT\", \"ALKPHOS\" in the last 72 hours.  Troponin:   Recent Labs     01/10/24  2219 01/10/24  2317   TROPHS 14 16     BNP: No results for input(s): \"BNP\" in the last 72 hours.  Lipids:   Recent Labs     01/11/24  0705   CHOL 221*   HDL 50     INR: No results for input(s): \"INR\" in the last 72 hours.    ECHO 1/10/24    Left Ventricle: Normal left ventricular systolic function. EF by 2D Simpsons Biplane is 63%. Left ventricle size is normal. Mildly increased wall thickness. Normal wall motion. Normal diastolic function.    Image quality is adequate.  Objective:   Vitals: BP (!) 163/73   Pulse 73   Temp 98.2 °F (36.8 °C)   Resp 16   Ht 1.753 m (5' 9\")   Wt 86.2 kg (190 lb)   SpO2 94%   BMI 28.06 kg/m²   General appearance: alert

## 2024-01-13 NOTE — DISCHARGE SUMMARY
[facility] Discharge Summary      Patient ID: Clark Morataya    MRN: [id number]     Acct:  [id number]       Patient's PCP: Laurel Amezquita MD    Admit Date: 1/10/2024     Discharge Date: 1/12/2024      Admitting Physician: Flavia Recio MD    Discharge Physician: Flavia Recio MD     Discharge Diagnoses:    Primary Problem  Chest pain    Active Hospital Problems    Diagnosis Date Noted    Abnormal EKG [R94.31] 01/11/2024     Priority: High    BPH (benign prostatic hyperplasia) [N40.0] 05/12/2022     Priority: Medium    Intractable pain [R52] 05/11/2022     Priority: Medium    Chest pain [R07.9] 01/11/2024    Coagulation defect (HCC) [D68.9] 07/26/2021    Primary hypertension [I10] 08/28/2017     Past Medical History:   Diagnosis Date    Basal cell carcinoma     Erectile dysfunction     Hearing aid worn     both ears    Hypertension     Juan Pablo Roblero, JAY JAY    Precancerous skin lesion     Squamous cell carcinoma      The patient was seen and examined on day of discharge and this discharge summary is in conjunction with any daily progress note from day of discharge.    Code Status:  Prior    Hospital Course: Uncomplicated, he was admitted with chest pain. His EKG was abnormal but his stress test was negative/low risk.    Consults:  cardiology    Significant Diagnostic Studies: as above, and as follows: see chart    Treatments: as above    Disposition: home    Discharged Condition: Stable    Follow Up:  Laurel Amezquita MD in one week    Discharge Medications:      Medication List        START taking these medications      aspirin 81 MG chewable tablet  Take 1 tablet by mouth daily     atorvastatin 40 MG tablet  Commonly known as: LIPITOR  Take 1 tablet by mouth nightly     nitroGLYCERIN 0.4 MG SL tablet  Commonly known as: NITROSTAT  Place 1 tablet under the tongue every 5 minutes as needed for Chest pain up to max of 3 total doses. If no relief after 1 dose, call 911.            CONTINUE taking

## 2024-01-22 PROBLEM — E78.2 MIXED HYPERLIPIDEMIA: Status: ACTIVE | Noted: 2024-01-22

## 2024-02-13 ENCOUNTER — HOSPITAL ENCOUNTER (OUTPATIENT)
Facility: CLINIC | Age: 74
Discharge: HOME | End: 2024-02-15
Payer: MEDICARE

## 2024-02-13 ENCOUNTER — HOSPITAL ENCOUNTER (OUTPATIENT)
Dept: GENERAL RADIOLOGY | Facility: CLINIC | Age: 74
Discharge: HOME | End: 2024-02-15
Payer: MEDICARE

## 2024-02-13 DIAGNOSIS — M25.512 CHRONIC PAIN OF BOTH SHOULDERS: ICD-10-CM

## 2024-02-13 DIAGNOSIS — M25.511 CHRONIC PAIN OF BOTH SHOULDERS: ICD-10-CM

## 2024-02-13 DIAGNOSIS — G89.29 CHRONIC PAIN OF BOTH SHOULDERS: ICD-10-CM

## 2024-02-13 PROCEDURE — 73030 X-RAY EXAM OF SHOULDER: CPT

## 2024-02-23 SDOH — HEALTH STABILITY: PHYSICAL HEALTH: ON AVERAGE, HOW MANY DAYS PER WEEK DO YOU ENGAGE IN MODERATE TO STRENUOUS EXERCISE (LIKE A BRISK WALK)?: 0 DAYS

## 2024-02-26 ENCOUNTER — OFFICE VISIT (OUTPATIENT)
Dept: ORTHOPEDIC SURGERY | Age: 74
End: 2024-02-26
Payer: MEDICARE

## 2024-02-26 VITALS — HEIGHT: 69 IN | BODY MASS INDEX: 27.4 KG/M2 | RESPIRATION RATE: 14 BRPM | WEIGHT: 185 LBS

## 2024-02-26 DIAGNOSIS — M75.21 BICEPS TENDINITIS OF BOTH SHOULDERS: ICD-10-CM

## 2024-02-26 DIAGNOSIS — M75.22 BICEPS TENDINITIS OF BOTH SHOULDERS: ICD-10-CM

## 2024-02-26 DIAGNOSIS — M75.81 TENDINITIS OF BOTH ROTATOR CUFFS: Primary | ICD-10-CM

## 2024-02-26 DIAGNOSIS — M25.511 RIGHT SHOULDER PAIN, UNSPECIFIED CHRONICITY: ICD-10-CM

## 2024-02-26 DIAGNOSIS — M75.82 TENDINITIS OF BOTH ROTATOR CUFFS: Primary | ICD-10-CM

## 2024-02-26 DIAGNOSIS — M25.512 LEFT SHOULDER PAIN, UNSPECIFIED CHRONICITY: ICD-10-CM

## 2024-02-26 PROCEDURE — 1123F ACP DISCUSS/DSCN MKR DOCD: CPT | Performed by: ORTHOPAEDIC SURGERY

## 2024-02-26 PROCEDURE — 99203 OFFICE O/P NEW LOW 30 MIN: CPT | Performed by: ORTHOPAEDIC SURGERY

## 2024-02-26 RX ORDER — DICLOFENAC SODIUM 75 MG/1
75 TABLET, DELAYED RELEASE ORAL 2 TIMES DAILY WITH MEALS
Qty: 28 TABLET | Refills: 0 | Status: SHIPPED | OUTPATIENT
Start: 2024-02-26 | End: 2024-03-11

## 2024-02-26 NOTE — PROGRESS NOTES
Orthopedic Shoulder Encounter Note     Chief complaint: bilateral shoulder pain    HPI: Clark Morataya is a 73 y.o.  right-hand dominant male who presents for evaluation of both his shoulders.  He indicates that he has been dealing with pain for about 3 months now.  No precipitating trauma or injury.  His pain is primarily localized to the lateral aspect of both shoulders extending distally and across the front and back of the shoulders.  Both hurt  equally.  He has a hard time laying flat in bed at night having to stand up as a result of pain.  He also cannot sleep on either side as a result of pain.  During the day his pain is fairly constant but fluctuates in intensity.  He describes having some weakness but no stiffness.  He also denies having any neck pain or associated paresthesias.    Previous treatment:    NSAIDs: Ibuprofen    Physical Therapy: Yes, this was cut short due to having foot surgery    Injections: None    Surgeries: None    Review of Systems:   Constitutional: Negative for fever, chills, sweats.   Pain score: 8-9/10  Neurological: Negative for headache, numbness, or weakness.   Musculoskeletal: As noted in HPI     Past Medical History  Clark  has a past medical history of Basal cell carcinoma, Erectile dysfunction, Hearing aid worn, Hypertension, Precancerous skin lesion, and Squamous cell carcinoma.    Past Surgical History  Clark  has a past surgical history that includes pre-malignant / benign skin lesion excision; Mohs surgery; malignant skin lesion excision; Tonsillectomy; and Prostate biopsy (N/A, 8/16/2021).    Current Medications  Current Outpatient Medications   Medication Sig Dispense Refill    pregabalin (LYRICA) 75 MG capsule Take 1 capsule by mouth 2 times daily for 120 days. Max Daily Amount: 150 mg 60 capsule 3    magnesium gluconate (MAGONATE) 500 MG tablet Take 400 mg by mouth daily      vitamin B-12 (CYANOCOBALAMIN) 1000 MCG tablet Take 1 tablet by mouth daily

## 2024-02-29 ENCOUNTER — OFFICE VISIT (OUTPATIENT)
Dept: NEUROSURGERY | Age: 74
End: 2024-02-29
Payer: MEDICARE

## 2024-02-29 VITALS
DIASTOLIC BLOOD PRESSURE: 84 MMHG | WEIGHT: 194 LBS | RESPIRATION RATE: 18 BRPM | TEMPERATURE: 98.3 F | HEART RATE: 73 BPM | SYSTOLIC BLOOD PRESSURE: 175 MMHG | OXYGEN SATURATION: 97 % | BODY MASS INDEX: 28.64 KG/M2

## 2024-02-29 DIAGNOSIS — M54.16 RIGHT LUMBAR RADICULOPATHY: Primary | ICD-10-CM

## 2024-02-29 DIAGNOSIS — M77.8 TENDINITIS OF SHOULDER, UNSPECIFIED LATERALITY: ICD-10-CM

## 2024-02-29 PROCEDURE — 1123F ACP DISCUSS/DSCN MKR DOCD: CPT | Performed by: NEUROLOGICAL SURGERY

## 2024-02-29 PROCEDURE — 3079F DIAST BP 80-89 MM HG: CPT | Performed by: NEUROLOGICAL SURGERY

## 2024-02-29 PROCEDURE — 99214 OFFICE O/P EST MOD 30 MIN: CPT | Performed by: NEUROLOGICAL SURGERY

## 2024-02-29 PROCEDURE — 3077F SYST BP >= 140 MM HG: CPT | Performed by: NEUROLOGICAL SURGERY

## 2024-02-29 NOTE — PROGRESS NOTES
Department of Neurosurgery                                                      Follow up visit      History Obtained From:  patient    CHIEF COMPLAINT:         Chief Complaint   Patient presents with    Arm Pain    Shoulder Pain       HISTORY OF PRESENT ILLNESS:       The patient is a 73 y.o. male who presents for follow up for right lumbar radiculopathy and right foot pain    Patient reports that he still has low back pain with right leg. The patient states that around Thanksgiving last year he had pain in his shoulders and upper arms. He states that the pain radiates into his chest and back around the shoulder area as well. He rates the pain as an 8 saying generally it is an aching pain but becomes a sharp pain with movement. He has been with physical therapy and has been doing exercises and stretches. He was seen by Dr. Stratton who believed it was tendinitis. He states that he can only lay down 2-3 hours and needs to get up and stretch to alleviate his shoulder pain. He admits to pain with shoulder motion. He says that the pain is worst on the right shoulder. He denies any numbness or shooting pain into the hands in either shoulder. He has begun using voltaren gel which he states has been the most effective treatment thus far. He has not seen pain management yet.    The patient states that he still has lumbar radiculopathy with pain in the right lower extremity, however is more concerned about his shoulder pains at this point. He notes that his numbness has minimally extended further into his right foot. He states that his strength is relatively good but states that if he is too active he will experience weakness which is alleviated by resting.    Patient's blood pressure is high at today's visit however he states that he has not taken his blood pressure medicine today and has drank 2 cups of coffee before today's visit. He states that at home it usually runs closer to 130/80.        PAST MEDICAL

## 2024-03-07 ENCOUNTER — HOSPITAL ENCOUNTER (OUTPATIENT)
Age: 74
Setting detail: SPECIMEN
Discharge: HOME | End: 2024-03-07

## 2024-03-07 DIAGNOSIS — R97.20 ELEVATED PSA: ICD-10-CM

## 2024-03-08 LAB — PSA SERPL-MCNC: 8.9 NG/ML (ref 0–4)

## 2024-03-12 ENCOUNTER — OFFICE VISIT (OUTPATIENT)
Dept: UROLOGY | Age: 74
End: 2024-03-12
Payer: MEDICARE

## 2024-03-12 VITALS
SYSTOLIC BLOOD PRESSURE: 132 MMHG | HEIGHT: 69 IN | HEART RATE: 74 BPM | OXYGEN SATURATION: 98 % | DIASTOLIC BLOOD PRESSURE: 76 MMHG | BODY MASS INDEX: 28.58 KG/M2 | WEIGHT: 193 LBS | TEMPERATURE: 98 F

## 2024-03-12 DIAGNOSIS — R97.20 ELEVATED PSA: Primary | ICD-10-CM

## 2024-03-12 DIAGNOSIS — N52.9 ERECTILE DYSFUNCTION, UNSPECIFIED ERECTILE DYSFUNCTION TYPE: ICD-10-CM

## 2024-03-12 DIAGNOSIS — N13.8 BPH WITH OBSTRUCTION/LOWER URINARY TRACT SYMPTOMS: ICD-10-CM

## 2024-03-12 DIAGNOSIS — N40.1 BPH WITH OBSTRUCTION/LOWER URINARY TRACT SYMPTOMS: ICD-10-CM

## 2024-03-12 PROCEDURE — 1123F ACP DISCUSS/DSCN MKR DOCD: CPT | Performed by: UROLOGY

## 2024-03-12 PROCEDURE — 3075F SYST BP GE 130 - 139MM HG: CPT | Performed by: UROLOGY

## 2024-03-12 PROCEDURE — 3078F DIAST BP <80 MM HG: CPT | Performed by: UROLOGY

## 2024-03-12 PROCEDURE — 99214 OFFICE O/P EST MOD 30 MIN: CPT | Performed by: UROLOGY

## 2024-03-12 RX ORDER — TAMSULOSIN HYDROCHLORIDE 0.4 MG/1
0.4 CAPSULE ORAL 2 TIMES DAILY
Qty: 180 CAPSULE | Refills: 3 | Status: SHIPPED | OUTPATIENT
Start: 2024-03-12 | End: 2025-03-12

## 2024-03-12 ASSESSMENT — ENCOUNTER SYMPTOMS
EYE PAIN: 0
RESPIRATORY NEGATIVE: 1
VOMITING: 0
SHORTNESS OF BREATH: 0
COLOR CHANGE: 0
EYE REDNESS: 0
COUGH: 0
GASTROINTESTINAL NEGATIVE: 1
BACK PAIN: 0
WHEEZING: 0
ALLERGIC/IMMUNOLOGIC NEGATIVE: 1
NAUSEA: 0
ABDOMINAL PAIN: 0
EYES NEGATIVE: 1

## 2024-03-12 NOTE — PROGRESS NOTES
[facility]  [address]  [facility] [address]  Dept: [phone]    [facility] Urology Office Note - Established    Patient:  Clark Morataya  YOB: 1950  Date: 3/12/2024    The patient is a 73 y.o. male who presents todayfor evaluation of the following problems:   Chief Complaint   Patient presents with    Elevated PSA     6 month follow up       HPI  He is here for elevated PSA.   MRI was PI RADS 4 in May 2021.   Targeted biopsy was negative.   SPA was 8.5 then and is now 8.9.   He is voiding better with Flomax .  He is doing well with Cialis as well.     Summary of old records: N/A    Additional History: N/A    Procedures Today: N/A    Urinalysis today:  No results found for this visit on 03/12/24.  Last several PSA's:  Lab Results   Component Value Date    PSA 8.90 (H) 03/07/2024    PSA 9.01 (H) 09/19/2023    PSA 8.88 (H) 03/07/2023     Last total testosterone:  Lab Results   Component Value Date    TESTOSTERONE 592 03/07/2023       AUA Symptom Score (3/12/2024):                               Last BUN and creatinine:  Lab Results   Component Value Date    BUN 21 01/10/2024     Lab Results   Component Value Date    CREATININE 1.0 01/10/2024       Additional Lab/Culture results: none    Imaging Reviewed during this Office Visit: none  (results were independently reviewed by physician and radiology report verified)    PAST MEDICAL, FAMILY AND SOCIAL HISTORY UPDATE:  Past Medical History:   Diagnosis Date    Basal cell carcinoma     Erectile dysfunction     Hearing aid worn     both ears    Hypertension     Juan Pablo Roblero, JAY JAY    Precancerous skin lesion     Squamous cell carcinoma      Past Surgical History:   Procedure Laterality Date    MALIGNANT SKIN LESION EXCISION      MOHS SURGERY      PRE-MALIGNANT / BENIGN SKIN LESION EXCISION      PROSTATE BIOPSY N/A 8/16/2021    FUSION PROSTATE BIOPSY AND ULTRASOUND

## 2024-04-04 ENCOUNTER — OFFICE VISIT (OUTPATIENT)
Dept: ORTHOPEDIC SURGERY | Age: 74
End: 2024-04-04
Payer: MEDICARE

## 2024-04-04 VITALS — RESPIRATION RATE: 14 BRPM | WEIGHT: 193 LBS | HEIGHT: 69 IN | BODY MASS INDEX: 28.58 KG/M2

## 2024-04-04 DIAGNOSIS — M75.82 TENDINITIS OF BOTH ROTATOR CUFFS: Primary | ICD-10-CM

## 2024-04-04 DIAGNOSIS — M75.81 TENDINITIS OF BOTH ROTATOR CUFFS: Primary | ICD-10-CM

## 2024-04-04 PROCEDURE — 99212 OFFICE O/P EST SF 10 MIN: CPT | Performed by: ORTHOPAEDIC SURGERY

## 2024-04-04 PROCEDURE — 1123F ACP DISCUSS/DSCN MKR DOCD: CPT | Performed by: ORTHOPAEDIC SURGERY

## 2024-04-04 RX ORDER — METHYLPREDNISOLONE 4 MG/1
TABLET ORAL
Qty: 1 KIT | Refills: 0 | Status: SHIPPED | OUTPATIENT
Start: 2024-04-04 | End: 2024-04-10

## 2024-04-04 NOTE — PROGRESS NOTES
HPI: Mr. Morataya is a 73-year-old gentleman with bilateral shoulder pain.  Have seen him in the past for this issue and he was diagnosed with rotator cuff tendinitis possible rotator cuff tears.  Treatment was initiated with physical therapy and prescription NSAIDs.  He states that initially was getting better but then his pain returned to baseline and has actually gotten worse.  He describes having some pain and associated weakness.  He also reports having some pain and involving both thumbs recently.    Evaluation of bilateral hands today demonstrate intact skin without warmth erythema notable swelling.  He is tender to palpation at the level of the first CMC joints bilaterally and has a positive grind test on the right side.  Negative Finkelstein's test.    Impression and plan: Mr. Morataya is a 73-year-old gentleman with bilateral shoulder pain.  He has failed attempts at conservative management as outlined above.  I had a discussion with the patient today recommending that we proceed with MRI studies of his shoulders to rule out the presence of full-thickness rotator cuff tears.  He was amenable to this and or an order was placed.  In the meantime he was placed on a Medrol Dosepak to help with his pain.  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  I again educated him aboutthis condition and discussed treatment options recommended symptomatic care at this time.  I will see him back in my clinic after the MRI study is completed to review and discuss results proceeding with additional treatment recommendations at that time as indicated

## 2024-04-12 ENCOUNTER — HOSPITAL ENCOUNTER (OUTPATIENT)
Dept: MRI IMAGING | Age: 74
End: 2024-04-12
Attending: ORTHOPAEDIC SURGERY
Payer: MEDICARE

## 2024-04-12 DIAGNOSIS — M75.81 TENDINITIS OF BOTH ROTATOR CUFFS: ICD-10-CM

## 2024-04-12 DIAGNOSIS — M75.82 TENDINITIS OF BOTH ROTATOR CUFFS: ICD-10-CM

## 2024-04-12 PROCEDURE — 73221 MRI JOINT UPR EXTREM W/O DYE: CPT

## 2024-04-17 ENCOUNTER — OFFICE VISIT (OUTPATIENT)
Dept: ORTHOPEDIC SURGERY | Age: 74
End: 2024-04-17

## 2024-04-17 VITALS — BODY MASS INDEX: 28.58 KG/M2 | WEIGHT: 193 LBS | RESPIRATION RATE: 14 BRPM | HEIGHT: 69 IN

## 2024-04-17 DIAGNOSIS — M75.21 BICEPS TENDINITIS OF BOTH SHOULDERS: Primary | ICD-10-CM

## 2024-04-17 DIAGNOSIS — M75.22 BICEPS TENDINITIS OF BOTH SHOULDERS: Primary | ICD-10-CM

## 2024-04-20 NOTE — PROGRESS NOTES
HPI: Mr. Morataya is a 73-year-old gentleman here today to review the results of bilateral shoulder MRI studies which were completed on 4/12/2024.  I reviewed the images independently myself with the patient today.  Starting with the right shoulder the MRI demonstrates some patchy increase in signal on T2 weighted images within the infraspinatus tendon posterosuperiorly.  No obvious full-thickness tears noted involving any of the rotator cuff tendons.  Biceps tendon does appear to be well located within the bicipital groove.  Labrum does appear to be generally intact.  Mild degenerative changes noted at the acromioclavicular joint.  The MRI of the left shoulder demonstrates an intact rotator cuff.  Biceps tendon is intact within the bicipital groove.  Moderate generative changes at the acromioclavicular joint.  I had a discussion with the patient today about the MRI findings and the overall condition of the shoulder.  Generally there are some wear-and-tear type findings but no significant structural damage warranting surgical intervention.  His pain has improved to some degree and he has good range of motion.  At this time I recommended continued symptomatic management.  May continue on with his home exercise program from physical therapy.  I will see him back in my clinic as needed but he was encouraged to return or call at anytime with persistent or worsening symptoms or with any questions or concerns.

## 2024-05-01 ENCOUNTER — OFFICE VISIT (OUTPATIENT)
Dept: ORTHOPEDIC SURGERY | Age: 74
End: 2024-05-01

## 2024-05-01 VITALS — BODY MASS INDEX: 28.58 KG/M2 | WEIGHT: 193 LBS | RESPIRATION RATE: 14 BRPM | HEIGHT: 69 IN

## 2024-05-01 DIAGNOSIS — M75.81 TENDONITIS OF BOTH ROTATOR CUFFS: Primary | ICD-10-CM

## 2024-05-01 DIAGNOSIS — M75.82 TENDONITIS OF BOTH ROTATOR CUFFS: Primary | ICD-10-CM

## 2024-05-01 RX ORDER — LIDOCAINE HYDROCHLORIDE 10 MG/ML
3 INJECTION, SOLUTION INFILTRATION; PERINEURAL ONCE
Status: COMPLETED | OUTPATIENT
Start: 2024-05-01 | End: 2024-05-01

## 2024-05-01 RX ORDER — TRIAMCINOLONE ACETONIDE 40 MG/ML
40 INJECTION, SUSPENSION INTRA-ARTICULAR; INTRAMUSCULAR ONCE
Status: COMPLETED | OUTPATIENT
Start: 2024-05-01 | End: 2024-05-01

## 2024-05-01 RX ADMIN — LIDOCAINE HYDROCHLORIDE 3 ML: 10 INJECTION, SOLUTION INFILTRATION; PERINEURAL at 08:38

## 2024-05-01 RX ADMIN — TRIAMCINOLONE ACETONIDE 40 MG: 40 INJECTION, SUSPENSION INTRA-ARTICULAR; INTRAMUSCULAR at 08:38

## 2024-05-01 RX ADMIN — TRIAMCINOLONE ACETONIDE 40 MG: 40 INJECTION, SUSPENSION INTRA-ARTICULAR; INTRAMUSCULAR at 08:39

## 2024-05-01 NOTE — PROGRESS NOTES
HPI: Mr. Morataya is a 73-year-old gentleman that have seen several times now for both of his shoulders.  Has been dealing with symptoms consistent with rotator cuff tendinitis.  MRI study recently completed failed to demonstrate any significant structural damage.  He has been treated thus far with prescription NSAIDs as well as physical therapy.  He continues to experience periods where his pain flares up.  1 such occasion was this morning.  He has kept up with his home exercise program.  He continues to have pain over the lateral and anterior aspects of his shoulder.  We discussed treatment options moving forward and I recommended proceeding with cortisone injections today into the subacromial spaces.  Should he fail to respond to this or have persistent symptoms especially over the anterior aspect of his shoulder we discussed the possibility of moving ahead with ultrasound-guided cortisone injections into the biceps tendon sheaths to rule out the biceps tendinitis is being the primary cause of his pain.  He is amenable to this plan and received the injection is as outlined below and will notify me if his pain persist in which case we will make a referral for his ultrasound-guided injections.    Procedure: Bilateral shoulder subacromial space injections  Following an appropriate discussion with the patient regarding the risks and benefits of the procedure he consented to proceed. Both shoulders were prepped using chlorhexadine solution. Using aseptic technique and through a posterior approach, each shoulder subacromial space was injected with a 4 cc mixture of 1cc 40mg/ml kenalog and 3 cc of 1% lidocaine without epinephrine. A band aid was applied to the injection sites. he tolerated the injections with no immediate adverse reactions.

## 2024-08-05 ENCOUNTER — OFFICE VISIT (OUTPATIENT)
Dept: ORTHOPEDIC SURGERY | Age: 74
End: 2024-08-05
Payer: MEDICARE

## 2024-08-05 DIAGNOSIS — M25.511 RIGHT SHOULDER PAIN, UNSPECIFIED CHRONICITY: Primary | ICD-10-CM

## 2024-08-05 PROCEDURE — 20610 DRAIN/INJ JOINT/BURSA W/O US: CPT | Performed by: ORTHOPAEDIC SURGERY

## 2024-08-05 RX ORDER — TRIAMCINOLONE ACETONIDE 40 MG/ML
40 INJECTION, SUSPENSION INTRA-ARTICULAR; INTRAMUSCULAR ONCE
Status: COMPLETED | OUTPATIENT
Start: 2024-08-05 | End: 2024-08-05

## 2024-08-05 RX ORDER — LIDOCAINE HYDROCHLORIDE 10 MG/ML
3 INJECTION, SOLUTION INFILTRATION; PERINEURAL ONCE
Status: COMPLETED | OUTPATIENT
Start: 2024-08-05 | End: 2024-08-05

## 2024-08-05 RX ADMIN — LIDOCAINE HYDROCHLORIDE 3 ML: 10 INJECTION, SOLUTION INFILTRATION; PERINEURAL at 14:31

## 2024-08-05 RX ADMIN — TRIAMCINOLONE ACETONIDE 40 MG: 40 INJECTION, SUSPENSION INTRA-ARTICULAR; INTRAMUSCULAR at 14:33
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
## 2024-09-10 ENCOUNTER — HOSPITAL ENCOUNTER (OUTPATIENT)
Age: 74
Setting detail: SPECIMEN
Discharge: HOME | End: 2024-09-10

## 2024-09-10 DIAGNOSIS — R97.20 ELEVATED PSA: ICD-10-CM

## 2024-09-10 LAB — PSA SERPL-MCNC: 11.1 NG/ML (ref 0–4)

## 2024-09-17 ENCOUNTER — TELEPHONE (OUTPATIENT)
Dept: UROLOGY | Age: 74
End: 2024-09-17

## 2024-09-17 ENCOUNTER — OFFICE VISIT (OUTPATIENT)
Dept: UROLOGY | Age: 74
End: 2024-09-17
Payer: MEDICARE

## 2024-09-17 VITALS
DIASTOLIC BLOOD PRESSURE: 84 MMHG | OXYGEN SATURATION: 98 % | HEIGHT: 69 IN | BODY MASS INDEX: 28.44 KG/M2 | SYSTOLIC BLOOD PRESSURE: 132 MMHG | TEMPERATURE: 97.8 F | WEIGHT: 192 LBS | HEART RATE: 94 BPM

## 2024-09-17 DIAGNOSIS — R97.20 ELEVATED PSA: Primary | ICD-10-CM

## 2024-09-17 DIAGNOSIS — N52.9 ERECTILE DYSFUNCTION, UNSPECIFIED ERECTILE DYSFUNCTION TYPE: ICD-10-CM

## 2024-09-17 PROCEDURE — 3017F COLORECTAL CA SCREEN DOC REV: CPT | Performed by: UROLOGY

## 2024-09-17 PROCEDURE — 99214 OFFICE O/P EST MOD 30 MIN: CPT | Performed by: UROLOGY

## 2024-09-17 PROCEDURE — G8427 DOCREV CUR MEDS BY ELIG CLIN: HCPCS | Performed by: UROLOGY

## 2024-09-17 PROCEDURE — 3079F DIAST BP 80-89 MM HG: CPT | Performed by: UROLOGY

## 2024-09-17 PROCEDURE — 3075F SYST BP GE 130 - 139MM HG: CPT | Performed by: UROLOGY

## 2024-09-17 PROCEDURE — G8417 CALC BMI ABV UP PARAM F/U: HCPCS | Performed by: UROLOGY

## 2024-09-17 PROCEDURE — 1123F ACP DISCUSS/DSCN MKR DOCD: CPT | Performed by: UROLOGY

## 2024-09-17 PROCEDURE — 1036F TOBACCO NON-USER: CPT | Performed by: UROLOGY

## 2024-09-17 RX ORDER — TADALAFIL 5 MG/1
5 TABLET ORAL DAILY
Qty: 90 TABLET | Refills: 3 | Status: SHIPPED | OUTPATIENT
Start: 2024-09-17 | End: 2024-09-17 | Stop reason: SDUPTHER

## 2024-09-17 ASSESSMENT — ENCOUNTER SYMPTOMS
COLOR CHANGE: 0
ABDOMINAL PAIN: 0
EYE PAIN: 0
RESPIRATORY NEGATIVE: 1
WHEEZING: 0
GASTROINTESTINAL NEGATIVE: 1
ALLERGIC/IMMUNOLOGIC NEGATIVE: 1
BACK PAIN: 0
COUGH: 0
VOMITING: 0
NAUSEA: 0
SHORTNESS OF BREATH: 0
EYES NEGATIVE: 1
EYE REDNESS: 0

## 2024-09-17 NOTE — TELEPHONE ENCOUNTER
Voicemail on clinic line from patient stating that he can get a 90 day supply of Tadalifil at no cost and at [facility], are you able to send it there as it was already sent to [facility] in [place], please advise.

## 2024-09-18 NOTE — TELEPHONE ENCOUNTER
Pt LM asking about this.  He is called and informed it was sent to Dr Ang. He will sign it when he returns to office.

## 2024-09-19 RX ORDER — TADALAFIL 5 MG/1
5 TABLET ORAL DAILY
Qty: 90 TABLET | Refills: 3 | Status: SHIPPED | OUTPATIENT
Start: 2024-09-19

## 2024-09-25 ENCOUNTER — HOSPITAL ENCOUNTER (OUTPATIENT)
Dept: MRI IMAGING | Age: 74
Discharge: HOME | End: 2024-09-27
Attending: UROLOGY
Payer: MEDICARE

## 2024-09-25 DIAGNOSIS — R97.20 ELEVATED PSA: ICD-10-CM

## 2024-09-25 LAB
EGFR, POC: >90 ML/MIN/1.73M2
POC CREATININE: 0.8 MG/DL (ref 0.51–1.19)

## 2024-09-25 PROCEDURE — A9579 GAD-BASE MR CONTRAST NOS,1ML: HCPCS | Performed by: UROLOGY

## 2024-09-25 PROCEDURE — 82565 ASSAY OF CREATININE: CPT

## 2024-09-25 PROCEDURE — 72197 MRI PELVIS W/O & W/DYE: CPT

## 2024-09-25 PROCEDURE — 6360000004 HC RX CONTRAST MEDICATION: Performed by: UROLOGY

## 2024-09-25 PROCEDURE — 2580000003 HC RX 258: Performed by: UROLOGY

## 2024-09-25 RX ORDER — 0.9 % SODIUM CHLORIDE 0.9 %
50 INTRAVENOUS SOLUTION INTRAVENOUS ONCE
Status: COMPLETED | OUTPATIENT
Start: 2024-09-25 | End: 2024-09-25

## 2024-09-25 RX ORDER — SODIUM CHLORIDE 0.9 % (FLUSH) 0.9 %
10 SYRINGE (ML) INJECTION PRN
Status: DISCONTINUED | OUTPATIENT
Start: 2024-09-25 | End: 2024-09-28 | Stop reason: HOSPADM

## 2024-09-25 RX ADMIN — SODIUM CHLORIDE, PRESERVATIVE FREE 10 ML: 5 INJECTION INTRAVENOUS at 11:54

## 2024-09-25 RX ADMIN — SODIUM CHLORIDE 50 ML: 9 INJECTION, SOLUTION INTRAVENOUS at 12:08

## 2024-09-25 RX ADMIN — GADOTERIDOL 18 ML: 279.3 INJECTION, SOLUTION INTRAVENOUS at 11:54

## 2024-10-15 ENCOUNTER — OFFICE VISIT (OUTPATIENT)
Dept: UROLOGY | Age: 74
End: 2024-10-15
Payer: MEDICARE

## 2024-10-15 VITALS
HEART RATE: 76 BPM | SYSTOLIC BLOOD PRESSURE: 132 MMHG | OXYGEN SATURATION: 94 % | DIASTOLIC BLOOD PRESSURE: 74 MMHG | HEIGHT: 69 IN | TEMPERATURE: 97.6 F | BODY MASS INDEX: 28.14 KG/M2 | WEIGHT: 190 LBS

## 2024-10-15 DIAGNOSIS — N52.9 ERECTILE DYSFUNCTION, UNSPECIFIED ERECTILE DYSFUNCTION TYPE: ICD-10-CM

## 2024-10-15 DIAGNOSIS — R93.89 ABNORMAL MRI: ICD-10-CM

## 2024-10-15 DIAGNOSIS — N13.8 BPH WITH OBSTRUCTION/LOWER URINARY TRACT SYMPTOMS: ICD-10-CM

## 2024-10-15 DIAGNOSIS — R97.20 ELEVATED PSA: Primary | ICD-10-CM

## 2024-10-15 DIAGNOSIS — N40.1 BPH WITH OBSTRUCTION/LOWER URINARY TRACT SYMPTOMS: ICD-10-CM

## 2024-10-15 PROCEDURE — 1123F ACP DISCUSS/DSCN MKR DOCD: CPT | Performed by: UROLOGY

## 2024-10-15 PROCEDURE — G8427 DOCREV CUR MEDS BY ELIG CLIN: HCPCS | Performed by: UROLOGY

## 2024-10-15 PROCEDURE — 3017F COLORECTAL CA SCREEN DOC REV: CPT | Performed by: UROLOGY

## 2024-10-15 PROCEDURE — 99214 OFFICE O/P EST MOD 30 MIN: CPT | Performed by: UROLOGY

## 2024-10-15 PROCEDURE — 3078F DIAST BP <80 MM HG: CPT | Performed by: UROLOGY

## 2024-10-15 PROCEDURE — 3075F SYST BP GE 130 - 139MM HG: CPT | Performed by: UROLOGY

## 2024-10-15 PROCEDURE — 1036F TOBACCO NON-USER: CPT | Performed by: UROLOGY

## 2024-10-15 PROCEDURE — G8484 FLU IMMUNIZE NO ADMIN: HCPCS | Performed by: UROLOGY

## 2024-10-15 PROCEDURE — G8417 CALC BMI ABV UP PARAM F/U: HCPCS | Performed by: UROLOGY

## 2024-10-15 RX ORDER — SILDENAFIL 100 MG/1
100 TABLET, FILM COATED ORAL DAILY PRN
Qty: 20 TABLET | Refills: 3 | Status: SHIPPED | OUTPATIENT
Start: 2024-10-15

## 2024-10-15 ASSESSMENT — ENCOUNTER SYMPTOMS
EYE PAIN: 0
NAUSEA: 0
SHORTNESS OF BREATH: 0
RESPIRATORY NEGATIVE: 1
BACK PAIN: 0
ALLERGIC/IMMUNOLOGIC NEGATIVE: 1
COLOR CHANGE: 0
ABDOMINAL PAIN: 0
GASTROINTESTINAL NEGATIVE: 1
COUGH: 0
EYE REDNESS: 0
WHEEZING: 0
VOMITING: 0
EYES NEGATIVE: 1

## 2024-10-15 NOTE — PROGRESS NOTES
Results   Component Value Date    BUN 20 01/08/2021     Lab Results   Component Value Date    CREATININE 0.89 01/08/2021       Additional Lab/Culture results: none    Imaging Reviewed during this Office Visit: none  (results were independently reviewed by physician and radiology report verified)    PAST MEDICAL, FAMILY AND SOCIAL HISTORY UPDATE:  Past Medical History:   Diagnosis Date    Basal cell carcinoma     Erectile dysfunction     Hypertension     Precancerous skin lesion     Squamous cell carcinoma      Past Surgical History:   Procedure Laterality Date    MALIGNANT SKIN LESION EXCISION      MOHS SURGERY      PRE-MALIGNANT / BENIGN SKIN LESION EXCISION       Family History   Problem Relation Age of Onset    High Blood Pressure Mother     Diabetes Neg Hx      Outpatient Medications Marked as Taking for the 5/25/21 encounter (Office Visit) with Wayne Emmanuel MD   Medication Sig Dispense Refill    tadalafil (CIALIS) 5 MG tablet Take 1 tablet by mouth daily 90 tablet 3    lisinopril-hydroCHLOROthiazide (PRINZIDE;ZESTORETIC) 10-12.5 MG per tablet TAKE 1 TABLET DAILY 90 tablet 1    Flaxseed, Linseed, (FLAXSEED OIL PO) Take 1,000 mLs by mouth      Red Yeast Rice 600 MG CAPS Take 1,200 mg by mouth 2 times daily 360 capsule 0    Coenzyme Q10 (COQ-10) 100 MG CAPS       Cholecalciferol (VITAMIN D3) 25 MCG (1000 UT) CAPS       tamsulosin (FLOMAX) 0.4 MG capsule TAKE 2 CAPSULES DAILY 180 capsule 3    Multiple Vitamin (MULTI-DAY VITAMINS PO) Take by mouth Elmer men prostate and virillity      Psyllium (METAMUCIL PO) Take by mouth      Bioflavonoid Products (KATHRYN-C) 500-200-60 MG TABS Take 1 tablet by mouth daily         Influenza vaccines  Social History     Tobacco Use   Smoking Status Never Smoker   Smokeless Tobacco Never Used     (Ifpatient a smoker, smoking cessation counseling offered)    Social History     Substance and Sexual Activity   Alcohol Use Yes    Alcohol/week: 0.0 standard drinks Comment: socially       REVIEW OF SYSTEMS:  Review of Systems    Physical Exam:      Vitals:    05/25/21 0837   BP: (!) 175/84   Pulse: 66   Temp: 97.1 °F (36.2 °C)     Body mass index is 27.17 kg/m². Patient is a 78 y.o. male in no acute distress and alert and oriented to person, place and time. Physical Exam  Constitutional: Patient in no acute distress. Neuro: Alert and oriented to person, place and time. Psych: Mood normal, affect normal  Lungs: Respiratory effort is normal  Cardiovascular: Warm & Pink  Abdomen: Soft, non-tender, non-distended with no CVA,  No flank tenderness,  Or hepatosplenomegaly   Lymphatics: No palpablelymphadenopathy. Bladder non-tender and not distended. Musculoskeletal: Normal gait and station      Assessment and Plan      1. Elevated PSA    2. Erectile dysfunction, unspecified erectile dysfunction type    3. BPH with obstruction/lower urinary tract symptoms           Plan:          He is doing well with Flomax. We discussed increasing Cialis dose as needed. Will obtain Prostate MRI for worsening PSA. Return in about 6 weeks (around 7/6/2021). Prescriptions Ordered:  Orders Placed This Encounter   Medications    tadalafil (CIALIS) 5 MG tablet     Sig: Take 1 tablet by mouth daily     Dispense:  90 tablet     Refill:  3     Orders Placed:  Orders Placed This Encounter   Procedures    MRI PROSTATE W WO CONTRAST     Standing Status:   Future     Standing Expiration Date:   5/25/2022           Luis Duque MD    Agree with the ROS entered by the MA. DISPLAY PLAN FREE TEXT

## 2024-10-15 NOTE — PROGRESS NOTES
[facility] PHYSICIANS [facility], [facility] UROLOGY CENTER  [address]  [facility] [address]  Dept: [phone]    [facility] Urology Office Note - Established    Patient:  Clark Morataya  YOB: 1950  Date: 10/15/2024    The patient is a 74 y.o. male who presents todayfor evaluation of the following problems:   Chief Complaint   Patient presents with    Results     4 week MRI         HPI  He is here in follow up for elevated PSA.   He had an MRI in 2021, which was PI RADS 4 and biopsy was negative.   He had a repeat MRI and is now PI RADS 4 in a different area.  He has ED despite Cialis.   He has taken it up to 20mg.     Summary of old records: N/A    Additional History: N/A    Procedures Today: N/A    Urinalysis today:  No results found for this visit on 10/15/24.  Last several PSA's:  Lab Results   Component Value Date    PSA 11.10 (H) 09/10/2024    PSA 8.90 (H) 03/07/2024    PSA 9.01 (H) 09/19/2023     Last total testosterone:  Lab Results   Component Value Date    TESTOSTERONE 592 03/07/2023       AUA Symptom Score (10/15/2024):                               Last BUN and creatinine:  Lab Results   Component Value Date    BUN 21 01/10/2024     Lab Results   Component Value Date    CREATININE 0.8 09/25/2024       Additional Lab/Culture results: none    Imaging Reviewed during this Office Visit: none  (results were independently reviewed by physician and radiology report verified)    PAST MEDICAL, FAMILY AND SOCIAL HISTORY UPDATE:  Past Medical History:   Diagnosis Date    Basal cell carcinoma     Erectile dysfunction     Hearing aid worn     both ears    Hypertension     Juan Pablo Roblero, JAY JAY    Precancerous skin lesion     Squamous cell carcinoma      Past Surgical History:   Procedure Laterality Date    MALIGNANT SKIN LESION EXCISION      MOHS SURGERY      PRE-MALIGNANT / BENIGN SKIN LESION EXCISION      PROSTATE BIOPSY N/A 8/16/2021    FUSION PROSTATE BIOPSY

## 2024-11-18 ENCOUNTER — TELEPHONE (OUTPATIENT)
Dept: ORTHOPEDIC SURGERY | Age: 74
End: 2024-11-18

## 2024-11-18 DIAGNOSIS — M75.21 BICEPS TENDINITIS OF RIGHT SHOULDER: Primary | ICD-10-CM

## 2024-11-18 NOTE — TELEPHONE ENCOUNTER
Patient called to get referral for diagnostic ultrasound-guided cortisone injection to the biceps tendon sheath discussed at last appt  Also wanting to see if he can get referral/order for physical therapy. [facility] like to [facility] to [facility]  Please advise  Thank you

## 2024-11-19 NOTE — TELEPHONE ENCOUNTER
Yes, he can be referred for the injection  PT: Patient to be seen 2-3 times a week for 4-6 weeks  Work on right shoulder rotator cuff, biceps, and scapular stabilizer strengthening.  Also work on gentle range of motion including posterior capsular stretching.  May use modalities as indicated.  Teach home exercise program.

## 2024-11-19 NOTE — TELEPHONE ENCOUNTER
Spoke with patient and provide him with Dr. Stratton's response. PT script and IR order dropped. Patient provided with phone number to schedule US guided right shoulder biceps tendon sheath injection. Patient is either going to come into office to  his PT script or will call office with a fax number to fax order to.

## 2024-11-20 ENCOUNTER — TELEPHONE (OUTPATIENT)
Dept: INTERVENTIONAL RADIOLOGY/VASCULAR | Age: 74
End: 2024-11-20

## 2024-11-25 ENCOUNTER — HOSPITAL ENCOUNTER (OUTPATIENT)
Dept: INTERVENTIONAL RADIOLOGY/VASCULAR | Age: 74
Discharge: HOME | End: 2024-11-27
Payer: MEDICARE

## 2024-11-25 DIAGNOSIS — M75.21 BICEPS TENDINITIS OF RIGHT SHOULDER: ICD-10-CM

## 2024-11-25 PROCEDURE — 76942 ECHO GUIDE FOR BIOPSY: CPT

## 2024-11-25 PROCEDURE — 2709999900 IR ARTHR/ASP/INJ INT JT/BURSA W US

## 2024-11-25 PROCEDURE — 6360000002 HC RX W HCPCS: Performed by: ORTHOPAEDIC SURGERY

## 2024-11-25 PROCEDURE — 20550 NJX 1 TENDON SHEATH/LIGAMENT: CPT

## 2024-11-25 RX ORDER — LIDOCAINE HYDROCHLORIDE 10 MG/ML
4 INJECTION, SOLUTION EPIDURAL; INFILTRATION; INTRACAUDAL; PERINEURAL ONCE
Status: COMPLETED | OUTPATIENT
Start: 2024-11-25 | End: 2024-11-25

## 2024-11-25 RX ORDER — TRIAMCINOLONE ACETONIDE 40 MG/ML
40 INJECTION, SUSPENSION INTRA-ARTICULAR; INTRAMUSCULAR ONCE
Status: COMPLETED | OUTPATIENT
Start: 2024-11-25 | End: 2024-11-25

## 2024-11-25 RX ADMIN — TRIAMCINOLONE ACETONIDE 40 MG: 40 INJECTION, SUSPENSION INTRA-ARTICULAR; INTRAMUSCULAR at 08:24

## 2024-11-25 RX ADMIN — LIDOCAINE HYDROCHLORIDE 2 ML: 10 INJECTION, SOLUTION EPIDURAL; INFILTRATION; INTRACAUDAL; PERINEURAL at 08:25

## 2025-03-14 DIAGNOSIS — R97.20 ELEVATED PSA: Primary | ICD-10-CM

## 2025-04-09 ENCOUNTER — HOSPITAL ENCOUNTER (OUTPATIENT)
Age: 75
Setting detail: SPECIMEN
Discharge: HOME | End: 2025-04-09

## 2025-04-09 DIAGNOSIS — R97.20 ELEVATED PSA: ICD-10-CM

## 2025-04-09 LAB — PSA SERPL-MCNC: 11.9 NG/ML (ref 0–4)

## 2025-04-15 ENCOUNTER — HOSPITAL ENCOUNTER (OUTPATIENT)
Age: 75
Setting detail: SPECIMEN
Discharge: HOME | End: 2025-04-15

## 2025-04-15 ENCOUNTER — PREP FOR PROCEDURE (OUTPATIENT)
Dept: UROLOGY | Age: 75
End: 2025-04-15

## 2025-04-15 ENCOUNTER — OFFICE VISIT (OUTPATIENT)
Dept: UROLOGY | Age: 75
End: 2025-04-15
Payer: MEDICARE

## 2025-04-15 ENCOUNTER — TELEPHONE (OUTPATIENT)
Dept: UROLOGY | Age: 75
End: 2025-04-15

## 2025-04-15 VITALS — TEMPERATURE: 97.7 F | SYSTOLIC BLOOD PRESSURE: 132 MMHG | HEART RATE: 85 BPM | DIASTOLIC BLOOD PRESSURE: 62 MMHG

## 2025-04-15 DIAGNOSIS — R93.89 ABNORMAL MRI: ICD-10-CM

## 2025-04-15 DIAGNOSIS — N40.1 BPH WITH OBSTRUCTION/LOWER URINARY TRACT SYMPTOMS: ICD-10-CM

## 2025-04-15 DIAGNOSIS — N13.8 BPH WITH OBSTRUCTION/LOWER URINARY TRACT SYMPTOMS: ICD-10-CM

## 2025-04-15 DIAGNOSIS — R97.20 ELEVATED PSA: ICD-10-CM

## 2025-04-15 DIAGNOSIS — R97.20 ELEVATED PSA: Primary | ICD-10-CM

## 2025-04-15 PROCEDURE — 3075F SYST BP GE 130 - 139MM HG: CPT | Performed by: UROLOGY

## 2025-04-15 PROCEDURE — 3078F DIAST BP <80 MM HG: CPT | Performed by: UROLOGY

## 2025-04-15 PROCEDURE — 1036F TOBACCO NON-USER: CPT | Performed by: UROLOGY

## 2025-04-15 PROCEDURE — 1123F ACP DISCUSS/DSCN MKR DOCD: CPT | Performed by: UROLOGY

## 2025-04-15 PROCEDURE — G8417 CALC BMI ABV UP PARAM F/U: HCPCS | Performed by: UROLOGY

## 2025-04-15 PROCEDURE — 1159F MED LIST DOCD IN RCRD: CPT | Performed by: UROLOGY

## 2025-04-15 PROCEDURE — G8427 DOCREV CUR MEDS BY ELIG CLIN: HCPCS | Performed by: UROLOGY

## 2025-04-15 PROCEDURE — 3017F COLORECTAL CA SCREEN DOC REV: CPT | Performed by: UROLOGY

## 2025-04-15 PROCEDURE — 99214 OFFICE O/P EST MOD 30 MIN: CPT | Performed by: UROLOGY

## 2025-04-15 RX ORDER — TAMSULOSIN HYDROCHLORIDE 0.4 MG/1
0.4 CAPSULE ORAL 2 TIMES DAILY
Qty: 180 CAPSULE | Refills: 3 | Status: SHIPPED | OUTPATIENT
Start: 2025-04-15 | End: 2026-04-15

## 2025-04-15 ASSESSMENT — ENCOUNTER SYMPTOMS
EYES NEGATIVE: 1
VOMITING: 0
EYE PAIN: 0
GASTROINTESTINAL NEGATIVE: 1
WHEEZING: 0
COLOR CHANGE: 0
SHORTNESS OF BREATH: 0
RESPIRATORY NEGATIVE: 1
COUGH: 0
ABDOMINAL PAIN: 0
NAUSEA: 0
ALLERGIC/IMMUNOLOGIC NEGATIVE: 1
BACK PAIN: 0
EYE REDNESS: 0

## 2025-04-15 NOTE — PROGRESS NOTES
Review of Systems   Constitutional: Negative.  Negative for appetite change, chills and fever.   HENT: Negative.     Eyes: Negative.  Negative for pain, redness and visual disturbance.   Respiratory: Negative.  Negative for cough, shortness of breath and wheezing.    Cardiovascular: Negative.  Negative for chest pain and leg swelling.   Gastrointestinal: Negative.  Negative for abdominal pain, nausea and vomiting.   Endocrine: Negative.    Genitourinary:  Positive for frequency. Negative for difficulty urinating, dysuria, flank pain, hematuria, testicular pain and urgency.   Musculoskeletal: Negative.  Negative for back pain, joint swelling and myalgias.   Skin: Negative.  Negative for color change, rash and wound.   Allergic/Immunologic: Negative.    Neurological: Negative.  Negative for dizziness, tremors, weakness, numbness and headaches.   Hematological: Negative.  Negative for adenopathy. Does not bruise/bleed easily.   Psychiatric/Behavioral: Negative.

## 2025-04-15 NOTE — PROGRESS NOTES
[facility] PHYSICIANS [facility], [facility] UROLOGY CENTER  [address]  [facility] [address]  Dept: [phone]    [facility] Urology Office Note - Established    Patient:  Clark Morataya  YOB: 1950  Date: 4/15/2025    The patient is a 74 y.o. male who presents todayfor evaluation of the following problems:   Chief Complaint   Patient presents with    Elevated PSA      6 months, PSA       HPI  He is here in follow up for elevated PSA.   MRI was targeted in 2021 nd negative.   Repeat MRI was PI RADS 4, but a new lesion.   We have discussed biopsy in the past.         Summary of old records: N/A    Additional History: N/A    Procedures Today: N/A    Urinalysis today:  No results found for this visit on 04/15/25.  Last several PSA's:  Lab Results   Component Value Date    PSA 11.90 (H) 04/09/2025    PSA 11.10 (H) 09/10/2024    PSA 8.90 (H) 03/07/2024     Last total testosterone:  Lab Results   Component Value Date    TESTOSTERONE 592 03/07/2023       AUA Symptom Score (4/15/2025):                               Last BUN and creatinine:  Lab Results   Component Value Date    BUN 21 01/10/2024     Lab Results   Component Value Date    CREATININE 0.8 09/25/2024       Additional Lab/Culture results: none    Imaging Reviewed during this Office Visit: none  (results were independently reviewed by physician and radiology report verified)    PAST MEDICAL, FAMILY AND SOCIAL HISTORY UPDATE:  Past Medical History:   Diagnosis Date    Basal cell carcinoma     Erectile dysfunction     Hearing aid worn     both ears    Hypertension     Juan Pablo Roblero, JAY JAY    Precancerous skin lesion     Squamous cell carcinoma      Past Surgical History:   Procedure Laterality Date    MALIGNANT SKIN LESION EXCISION      MOHS SURGERY      PRE-MALIGNANT / BENIGN SKIN LESION EXCISION      PROSTATE BIOPSY N/A 8/16/2021    FUSION PROSTATE BIOPSY AND ULTRASOUND performed by Carl Ang MD at [facility]

## 2025-04-15 NOTE — TELEPHONE ENCOUNTER
Fusion prostate bx & us @ [facility] 6/23/25 10:00am **STOP BLOOD THINNERS 6/16/25**   PAT same day         Please place order for antibiotic       Spoke with patient in office, procedure info given to patient

## 2025-04-21 ENCOUNTER — TELEPHONE (OUTPATIENT)
Dept: UROLOGY | Age: 75
End: 2025-04-21

## 2025-04-24 LAB
FLUOROQUINOLONE RESISTANT ORG, CULT: NORMAL
ZZ NTE WITH NAME CLEAN UP: SPECIMEN SOURCE: NORMAL

## 2025-04-30 ENCOUNTER — RESULTS FOLLOW-UP (OUTPATIENT)
Dept: UROLOGY | Age: 75
End: 2025-04-30

## 2025-04-30 RX ORDER — CIPROFLOXACIN 500 MG/1
500 TABLET, FILM COATED ORAL 2 TIMES DAILY
Qty: 6 TABLET | Refills: 0 | Status: SHIPPED | OUTPATIENT
Start: 2025-04-30 | End: 2025-05-03

## 2025-06-19 NOTE — H&P
Pre-op History and Physical      Patient:  Clark Morataya  MRN: [id number]  YOB: 1950    HISTORY OF PRESENT ILLNESS:     The patient is a 75 y.o. male who presents with elevated PSA, abnormal MRI. Here for fusion prostate biopsy with US.    Patient's old records, notes and chart reviewed and summarized above.     I independently reviewed the images and verified the radiology reports from:    No results found.      Past Medical History:    Past Medical History:   Diagnosis Date    Basal cell carcinoma     Erectile dysfunction     Hearing aid worn     both ears    Hearing loss     Hypertension     Juan Pablo Roblero CNP    Precancerous skin lesion     Squamous cell carcinoma        Past Surgical History:    Past Surgical History:   Procedure Laterality Date    MALIGNANT SKIN LESION EXCISION      MOHS SURGERY      PRE-MALIGNANT / BENIGN SKIN LESION EXCISION      PROSTATE BIOPSY N/A 8/16/2021    FUSION PROSTATE BIOPSY AND ULTRASOUND performed by Carl Ang MD at [facility] OR    TONSILLECTOMY         Medications Prior to Admission:    Prior to Admission medications    Medication Sig Start Date End Date Taking? Authorizing Provider   olmesartan-hydroCHLOROthiazide (BENICAR HCT) 40-25 MG per tablet Take 1 tablet by mouth daily 4/30/25   Laurel Amezquita MD   Flaxseed, Linseed, (FLAXSEED OIL PO) Take by mouth    Norbert Lorenz MD   tamsulosin (FLOMAX) 0.4 MG capsule Take 1 capsule by mouth 2 times daily 4/15/25 4/15/26  Carl Ang MD   sildenafil (VIAGRA) 100 MG tablet Take 1 tablet by mouth daily as needed for Erectile Dysfunction 10/15/24   Carl Ang MD   tadalafil (CIALIS) 5 MG tablet Take 1 tablet by mouth daily 9/19/24   Monik Samano, APRN - CNP   vitamin B-12 (CYANOCOBALAMIN) 1000 MCG tablet Take 1 tablet by mouth daily    ProviderNorbert MD   Cholecalciferol (VITAMIN D3) 25 MCG (1000 UT) CAPS Take 1 capsule by mouth daily  Patient taking differently: Take 2 capsules by

## 2025-06-23 ENCOUNTER — HOSPITAL ENCOUNTER (OUTPATIENT)
Age: 75
Setting detail: OUTPATIENT SURGERY
Discharge: HOME | End: 2025-06-23
Attending: UROLOGY | Admitting: UROLOGY
Payer: MEDICARE

## 2025-06-23 ENCOUNTER — ANESTHESIA (OUTPATIENT)
Dept: OPERATING ROOM | Age: 75
End: 2025-06-23
Payer: MEDICARE

## 2025-06-23 ENCOUNTER — HOSPITAL ENCOUNTER (OUTPATIENT)
Dept: ULTRASOUND IMAGING | Age: 75
Setting detail: OUTPATIENT SURGERY
Discharge: HOME | End: 2025-06-25
Attending: UROLOGY
Payer: MEDICARE

## 2025-06-23 ENCOUNTER — ANESTHESIA EVENT (OUTPATIENT)
Dept: OPERATING ROOM | Age: 75
End: 2025-06-23
Payer: MEDICARE

## 2025-06-23 VITALS
RESPIRATION RATE: 20 BRPM | SYSTOLIC BLOOD PRESSURE: 159 MMHG | OXYGEN SATURATION: 99 % | TEMPERATURE: 97.7 F | HEIGHT: 69 IN | BODY MASS INDEX: 27.4 KG/M2 | HEART RATE: 67 BPM | WEIGHT: 185 LBS | DIASTOLIC BLOOD PRESSURE: 70 MMHG

## 2025-06-23 DIAGNOSIS — R97.20 ELEVATED PSA: ICD-10-CM

## 2025-06-23 DIAGNOSIS — R93.89 ABNORMAL MRI: ICD-10-CM

## 2025-06-23 LAB
BUN BLD-MCNC: 17 MG/DL (ref 8–26)
EGFR, POC: 79 ML/MIN/1.73M2
GLUCOSE BLD-MCNC: 124 MG/DL (ref 74–100)
POC CREATININE: 1 MG/DL (ref 0.51–1.19)

## 2025-06-23 PROCEDURE — 76942 ECHO GUIDE FOR BIOPSY: CPT

## 2025-06-23 PROCEDURE — 82947 ASSAY GLUCOSE BLOOD QUANT: CPT

## 2025-06-23 PROCEDURE — 3600000012 HC SURGERY LEVEL 2 ADDTL 15MIN: Performed by: UROLOGY

## 2025-06-23 PROCEDURE — 3700000000 HC ANESTHESIA ATTENDED CARE: Performed by: UROLOGY

## 2025-06-23 PROCEDURE — 2580000003 HC RX 258

## 2025-06-23 PROCEDURE — 6360000002 HC RX W HCPCS

## 2025-06-23 PROCEDURE — 7100000010 HC PHASE II RECOVERY - FIRST 15 MIN: Performed by: UROLOGY

## 2025-06-23 PROCEDURE — 88305 TISSUE EXAM BY PATHOLOGIST: CPT

## 2025-06-23 PROCEDURE — 3700000001 HC ADD 15 MINUTES (ANESTHESIA): Performed by: UROLOGY

## 2025-06-23 PROCEDURE — 82565 ASSAY OF CREATININE: CPT

## 2025-06-23 PROCEDURE — 3600000002 HC SURGERY LEVEL 2 BASE: Performed by: UROLOGY

## 2025-06-23 PROCEDURE — 88344 IMHCHEM/IMCYTCHM EA MLT ANTB: CPT

## 2025-06-23 PROCEDURE — 2580000003 HC RX 258: Performed by: ANESTHESIOLOGY

## 2025-06-23 PROCEDURE — 84520 ASSAY OF UREA NITROGEN: CPT

## 2025-06-23 PROCEDURE — 7100000011 HC PHASE II RECOVERY - ADDTL 15 MIN: Performed by: UROLOGY

## 2025-06-23 PROCEDURE — 2709999900 HC NON-CHARGEABLE SUPPLY: Performed by: UROLOGY

## 2025-06-23 PROCEDURE — 6360000002 HC RX W HCPCS: Performed by: UROLOGY

## 2025-06-23 RX ORDER — SODIUM CHLORIDE, SODIUM LACTATE, POTASSIUM CHLORIDE, CALCIUM CHLORIDE 600; 310; 30; 20 MG/100ML; MG/100ML; MG/100ML; MG/100ML
INJECTION, SOLUTION INTRAVENOUS
Status: DISCONTINUED | OUTPATIENT
Start: 2025-06-23 | End: 2025-06-23 | Stop reason: SDUPTHER

## 2025-06-23 RX ORDER — HYDRALAZINE HYDROCHLORIDE 20 MG/ML
10 INJECTION INTRAMUSCULAR; INTRAVENOUS
Status: DISCONTINUED | OUTPATIENT
Start: 2025-06-23 | End: 2025-06-23 | Stop reason: HOSPADM

## 2025-06-23 RX ORDER — NALOXONE HYDROCHLORIDE 0.4 MG/ML
INJECTION, SOLUTION INTRAMUSCULAR; INTRAVENOUS; SUBCUTANEOUS PRN
Status: DISCONTINUED | OUTPATIENT
Start: 2025-06-23 | End: 2025-06-23 | Stop reason: HOSPADM

## 2025-06-23 RX ORDER — SODIUM CHLORIDE 0.9 % (FLUSH) 0.9 %
5-40 SYRINGE (ML) INJECTION EVERY 12 HOURS SCHEDULED
Status: DISCONTINUED | OUTPATIENT
Start: 2025-06-23 | End: 2025-06-23 | Stop reason: HOSPADM

## 2025-06-23 RX ORDER — OXYCODONE HYDROCHLORIDE 5 MG/1
5 TABLET ORAL PRN
Status: DISCONTINUED | OUTPATIENT
Start: 2025-06-23 | End: 2025-06-23 | Stop reason: HOSPADM

## 2025-06-23 RX ORDER — OXYCODONE HYDROCHLORIDE 5 MG/1
10 TABLET ORAL PRN
Status: DISCONTINUED | OUTPATIENT
Start: 2025-06-23 | End: 2025-06-23 | Stop reason: HOSPADM

## 2025-06-23 RX ORDER — LIDOCAINE HYDROCHLORIDE 10 MG/ML
INJECTION, SOLUTION EPIDURAL; INFILTRATION; INTRACAUDAL; PERINEURAL
Status: DISCONTINUED | OUTPATIENT
Start: 2025-06-23 | End: 2025-06-23 | Stop reason: SDUPTHER

## 2025-06-23 RX ORDER — DROPERIDOL 2.5 MG/ML
0.62 INJECTION, SOLUTION INTRAMUSCULAR; INTRAVENOUS
Status: DISCONTINUED | OUTPATIENT
Start: 2025-06-23 | End: 2025-06-23 | Stop reason: HOSPADM

## 2025-06-23 RX ORDER — DIPHENHYDRAMINE HYDROCHLORIDE 50 MG/ML
12.5 INJECTION, SOLUTION INTRAMUSCULAR; INTRAVENOUS
Status: DISCONTINUED | OUTPATIENT
Start: 2025-06-23 | End: 2025-06-23 | Stop reason: HOSPADM

## 2025-06-23 RX ORDER — PROPOFOL 10 MG/ML
INJECTION, EMULSION INTRAVENOUS
Status: DISCONTINUED | OUTPATIENT
Start: 2025-06-23 | End: 2025-06-23 | Stop reason: SDUPTHER

## 2025-06-23 RX ORDER — LORAZEPAM 2 MG/ML
0.5 INJECTION INTRAMUSCULAR
Status: DISCONTINUED | OUTPATIENT
Start: 2025-06-23 | End: 2025-06-23 | Stop reason: HOSPADM

## 2025-06-23 RX ORDER — FENTANYL CITRATE 50 UG/ML
25 INJECTION, SOLUTION INTRAMUSCULAR; INTRAVENOUS EVERY 5 MIN PRN
Status: DISCONTINUED | OUTPATIENT
Start: 2025-06-23 | End: 2025-06-23 | Stop reason: HOSPADM

## 2025-06-23 RX ORDER — SODIUM CHLORIDE, SODIUM LACTATE, POTASSIUM CHLORIDE, CALCIUM CHLORIDE 600; 310; 30; 20 MG/100ML; MG/100ML; MG/100ML; MG/100ML
INJECTION, SOLUTION INTRAVENOUS CONTINUOUS
Status: DISCONTINUED | OUTPATIENT
Start: 2025-06-23 | End: 2025-06-23 | Stop reason: HOSPADM

## 2025-06-23 RX ORDER — SODIUM CHLORIDE 0.9 % (FLUSH) 0.9 %
5-40 SYRINGE (ML) INJECTION PRN
Status: DISCONTINUED | OUTPATIENT
Start: 2025-06-23 | End: 2025-06-23 | Stop reason: HOSPADM

## 2025-06-23 RX ORDER — CIPROFLOXACIN 500 MG/1
500 TABLET, FILM COATED ORAL 2 TIMES DAILY
COMMUNITY

## 2025-06-23 RX ORDER — LIDOCAINE HYDROCHLORIDE 10 MG/ML
INJECTION, SOLUTION INFILTRATION; PERINEURAL PRN
Status: DISCONTINUED | OUTPATIENT
Start: 2025-06-23 | End: 2025-06-23 | Stop reason: ALTCHOICE

## 2025-06-23 RX ORDER — SODIUM CHLORIDE 9 MG/ML
INJECTION, SOLUTION INTRAVENOUS PRN
Status: DISCONTINUED | OUTPATIENT
Start: 2025-06-23 | End: 2025-06-23 | Stop reason: HOSPADM

## 2025-06-23 RX ORDER — LABETALOL HYDROCHLORIDE 5 MG/ML
10 INJECTION, SOLUTION INTRAVENOUS
Status: DISCONTINUED | OUTPATIENT
Start: 2025-06-23 | End: 2025-06-23 | Stop reason: HOSPADM

## 2025-06-23 RX ORDER — PROCHLORPERAZINE EDISYLATE 5 MG/ML
5 INJECTION INTRAMUSCULAR; INTRAVENOUS
Status: DISCONTINUED | OUTPATIENT
Start: 2025-06-23 | End: 2025-06-23 | Stop reason: HOSPADM

## 2025-06-23 RX ADMIN — SODIUM CHLORIDE, SODIUM LACTATE, POTASSIUM CHLORIDE, AND CALCIUM CHLORIDE: .6; .31; .03; .02 INJECTION, SOLUTION INTRAVENOUS at 09:15

## 2025-06-23 RX ADMIN — PROPOFOL 80 MG: 10 INJECTION, EMULSION INTRAVENOUS at 09:32

## 2025-06-23 RX ADMIN — PROPOFOL 180 MCG/KG/MIN: 10 INJECTION, EMULSION INTRAVENOUS at 09:31

## 2025-06-23 RX ADMIN — SODIUM CHLORIDE, POTASSIUM CHLORIDE, SODIUM LACTATE AND CALCIUM CHLORIDE: 600; 310; 30; 20 INJECTION, SOLUTION INTRAVENOUS at 09:23

## 2025-06-23 RX ADMIN — LIDOCAINE HYDROCHLORIDE 50 MG: 10 INJECTION, SOLUTION EPIDURAL; INFILTRATION; INTRACAUDAL; PERINEURAL at 09:31

## 2025-06-23 ASSESSMENT — PAIN - FUNCTIONAL ASSESSMENT
PAIN_FUNCTIONAL_ASSESSMENT: 0-10
PAIN_FUNCTIONAL_ASSESSMENT: 0-10
PAIN_FUNCTIONAL_ASSESSMENT: ACTIVITIES ARE NOT PREVENTED

## 2025-06-23 ASSESSMENT — PAIN DESCRIPTION - DESCRIPTORS: DESCRIPTORS: ACHING

## 2025-06-23 NOTE — ANESTHESIA POSTPROCEDURE EVALUATION
Department of Anesthesiology  Postprocedure Note    Patient: Clark Morataya  MRN: [id number]  YOB: 1950  Date of evaluation: 6/23/2025    Procedure Summary       Date: 06/23/25 Room / Location: [address]    Anesthesia Start: 0923 Anesthesia Stop: 0950    Procedure: FUSION PROSTATE BIOPSY ULTRASOUND Diagnosis:       Elevated PSA      Abnormal MRI      (Elevated PSA [R97.20])      (Abnormal MRI [R93.89])    Surgeons: Carl Ang MD Responsible Provider: Scott Avila MD    Anesthesia Type: MAC ASA Status: 3            Anesthesia Type: No value filed.    Sj Phase I: Sj Score: 10    Sj Phase II: Sj Score: 10    Anesthesia Post Evaluation    Patient location during evaluation: PACU  Patient participation: complete - patient participated  Level of consciousness: awake and alert  Airway patency: patent  Nausea & Vomiting: no nausea and no vomiting  Cardiovascular status: blood pressure returned to baseline  Respiratory status: acceptable  Hydration status: euvolemic  Pain management: adequate    No notable events documented.

## 2025-06-23 NOTE — ANESTHESIA PRE PROCEDURE
BMI:   Wt Readings from Last 3 Encounters:   06/20/25 83.9 kg (185 lb)   04/30/25 88.3 kg (194 lb 9.6 oz)   10/15/24 86.2 kg (190 lb)     Body mass index is 27.32 kg/m².    CBC:   Lab Results   Component Value Date/Time    WBC 7.3 01/11/2024 07:05 AM    RBC 5.01 01/11/2024 07:05 AM    HGB 13.8 01/11/2024 07:05 AM    HCT 41.6 01/11/2024 07:05 AM    MCV 83.0 01/11/2024 07:05 AM    RDW 15.1 01/11/2024 07:05 AM     01/11/2024 07:05 AM       CMP:   Lab Results   Component Value Date/Time     01/10/2024 10:19 PM    K 3.8 01/10/2024 10:19 PM     01/10/2024 10:19 PM    CO2 25 01/10/2024 10:19 PM    BUN 21 01/10/2024 10:19 PM    CREATININE 0.8 09/25/2024 11:15 AM    CREATININE 1.0 01/10/2024 10:19 PM    GFRAA >60 08/24/2022 07:35 AM    LABGLOM >60 01/10/2024 10:19 PM    GLUCOSE 270 01/10/2024 10:19 PM    CALCIUM 9.1 01/10/2024 10:19 PM    BILITOT 0.5 11/27/2023 07:36 AM    ALKPHOS 79 11/27/2023 07:36 AM    AST 19 11/27/2023 07:36 AM    ALT 10 11/27/2023 07:36 AM       POC Tests: No results for input(s): \"POCGLU\", \"POCNA\", \"POCK\", \"POCCL\", \"POCBUN\", \"POCHEMO\", \"POCHCT\" in the last 72 hours.    Coags:   Lab Results   Component Value Date/Time    PROTIME 10.5 05/12/2022 06:51 AM    INR 1.0 05/12/2022 06:51 AM       HCG (If Applicable): No results found for: \"PREGTESTUR\", \"PREGSERUM\", \"HCG\", \"HCGQUANT\"     ABGs: No results found for: \"PHART\", \"PO2ART\", \"ORO7VPS\", \"MNI6FXE\", \"BEART\", \"T1HNOZNM\"     Type & Screen (If Applicable):  No results found for: \"ABORH\", \"LABANTI\"    Drug/Infectious Status (If Applicable):  Lab Results   Component Value Date/Time    HEPCAB NONREACTIVE 12/17/2019 07:46 AM       COVID-19 Screening (If Applicable): No results found for: \"COVID19\"        Anesthesia Evaluation  Patient summary reviewed and Nursing notes reviewed  Airway: Mallampati: III  TM distance: >3 FB   Neck ROM: full  Mouth opening: > = 3 FB   Dental:    (+)

## 2025-06-23 NOTE — DISCHARGE INSTRUCTIONS
Post op instructions: transrectal ultrasound guided prostate biopsy  You may experience blood in stool, urine, and semen.  This should resolve over the next couple days.  Post operative infections can sometimes occur.  Please call if you develop fevers > 101F, or shaking chills like you have the flu.  Please continue the antibiotics as directed.     Tylenol for pain control  Pt ok to discharge home in good condition  No heavy lifting, >10 lbs for today  Pt should avoid strenuous activity for today  Pt should walk moderately at home  Pt ok to shower   Pt may resume diet as tolerated  Pt should take Rx as directed: cipro that was previously perscribed.  No driving while on narcotics  Please call attending physician or hospital  with questions  Call or Present to ED if fever (> 101F), intractable nausea vomiting or pain.    If taking, Please hold blood thinning medications for 3-5 days till hematuria improves.     Pt should follow up with Dr. Ang, in 1-2 week for pathology results.  Call to confirm appointment.      No alcoholic beverages, no driving or operating machinery, no making important decisions for 24 hours.   You may have a normal diet but should eat lightly day of surgery.  Drink plenty of fluids.  Urinate within 8 hours after surgery, if unable to urinate call your doctor

## 2025-06-23 NOTE — OP NOTE
Operative Note      Patient: Clark Morataya  YOB: 1950  MRN: [id number]    Date of Procedure: 6/23/2025    Pre-Op Diagnosis Codes:      * Elevated PSA [R97.20]     * Abnormal MRI [R93.89]    Post-Op Diagnosis: Same       Procedure(s):  FUSION PROSTATE BIOPSY ULTRASOUND    Surgeon(s):  Carl Ang MD    Assistant:   Resident: Larry Appiah MD    Anesthesia: Monitor Anesthesia Care    Estimated Blood Loss (mL): Minimal    Complications: None    Specimens:   ID Type Source Tests Collected by Time Destination   A : PROSTATE BIOPSY X12 Tissue Prostate SURGICAL PATHOLOGY Carl Ang MD 6/23/2025 0855    B : RIGHT MID TARGET LESION Tissue Prostate SURGICAL PATHOLOGY Carl Ang MD 6/23/2025 0913        Implants:  * No implants in log *      Drains: * No LDAs found *    Findings:  Infection Present At Time Of Surgery (PATOS) (choose all levels that have infection present):  No infection present  Other Findings: 6 biopsies taken form right mid target lesion  1 core taken from each segment of prostate  60g prostate      INDICATIONS:  Clark Morataya is a 75 y.o. who has history of elevated PSA.  He was found to have a PIRADs 4 lesion in MRI.   He is here today for MRI ultrasound fusion biopsy,  the risks, benefits and alternatives were explained and informed consent was signed.     OPERATIVE NARRATIVE:  The patient was brought to the operating room. He was properly identified.  The procedure was confirmed verbally. The patient was administered a monitored anesthetic. He was placed in the lateral decubitus position. The ultrasound probe was placed into the rectum and prostatography ensued.  The check24 workstation was coupled to the ultrasound probe and using the device, a series of ultrasonic images of the prostate, seminal vesicles and base of the bladder were obtained.  These images were then subsequently reconstructed in 3D space using the check24 workstation.  The MRI images were

## 2025-06-25 LAB — SURGICAL PATHOLOGY REPORT: NORMAL

## 2025-07-01 ENCOUNTER — OFFICE VISIT (OUTPATIENT)
Dept: UROLOGY | Age: 75
End: 2025-07-01
Payer: MEDICARE

## 2025-07-01 VITALS
WEIGHT: 185 LBS | HEART RATE: 60 BPM | BODY MASS INDEX: 27.4 KG/M2 | HEIGHT: 69 IN | TEMPERATURE: 97.5 F | OXYGEN SATURATION: 95 % | DIASTOLIC BLOOD PRESSURE: 76 MMHG | SYSTOLIC BLOOD PRESSURE: 130 MMHG

## 2025-07-01 DIAGNOSIS — R97.20 ELEVATED PSA: Primary | ICD-10-CM

## 2025-07-01 DIAGNOSIS — C61 PROSTATE CANCER (HCC): ICD-10-CM

## 2025-07-01 PROCEDURE — 99214 OFFICE O/P EST MOD 30 MIN: CPT | Performed by: UROLOGY

## 2025-07-01 PROCEDURE — 1123F ACP DISCUSS/DSCN MKR DOCD: CPT | Performed by: UROLOGY

## 2025-07-01 PROCEDURE — 3075F SYST BP GE 130 - 139MM HG: CPT | Performed by: UROLOGY

## 2025-07-01 PROCEDURE — G8417 CALC BMI ABV UP PARAM F/U: HCPCS | Performed by: UROLOGY

## 2025-07-01 PROCEDURE — G8427 DOCREV CUR MEDS BY ELIG CLIN: HCPCS | Performed by: UROLOGY

## 2025-07-01 PROCEDURE — 3078F DIAST BP <80 MM HG: CPT | Performed by: UROLOGY

## 2025-07-01 PROCEDURE — 3017F COLORECTAL CA SCREEN DOC REV: CPT | Performed by: UROLOGY

## 2025-07-01 PROCEDURE — 1159F MED LIST DOCD IN RCRD: CPT | Performed by: UROLOGY

## 2025-07-01 PROCEDURE — 1036F TOBACCO NON-USER: CPT | Performed by: UROLOGY

## 2025-07-01 ASSESSMENT — ENCOUNTER SYMPTOMS
EYES NEGATIVE: 1
COLOR CHANGE: 0
EYE REDNESS: 0
COUGH: 0
NAUSEA: 0
BACK PAIN: 0
RESPIRATORY NEGATIVE: 1
VOMITING: 0
EYE PAIN: 0
GASTROINTESTINAL NEGATIVE: 1
ALLERGIC/IMMUNOLOGIC NEGATIVE: 1
SHORTNESS OF BREATH: 0
WHEEZING: 0
ABDOMINAL PAIN: 0

## 2025-07-01 NOTE — PROGRESS NOTES
[facility] PHYSICIANS [facility], [facility] UROLOGY CENTER  [address]  [place] [address]  Dept: [phone]    [facility] Urology Office Note - Established    Patient:  Clark Morataya  YOB: 1950  Date: 7/1/2025    The patient is a 75 y.o. male who presents todayfor evaluation of the following problems:   Chief Complaint   Patient presents with    Results     s/p Prostate bx         HPI  Here in follow up after prostate biopsy.  He did well.   No fevers   Path was GG1 and GG2.       Summary of old records: N/A    Additional History: N/A    Procedures Today: N/A    Urinalysis today:  No results found for this visit on 07/01/25.  Last several PSA's:  Lab Results   Component Value Date    PSA 11.90 (H) 04/09/2025    PSA 11.10 (H) 09/10/2024    PSA 8.90 (H) 03/07/2024     Last total testosterone:  Lab Results   Component Value Date    TESTOSTERONE 592 03/07/2023       AUA Symptom Score (7/1/2025):                               Last BUN and creatinine:  Lab Results   Component Value Date    BUN 21 01/10/2024     Lab Results   Component Value Date    CREATININE 1.0 06/23/2025       Additional Lab/Culture results: none    Imaging Reviewed during this Office Visit: none  (results were independently reviewed by physician and radiology report verified)    PAST MEDICAL, FAMILY AND SOCIAL HISTORY UPDATE:  Past Medical History:   Diagnosis Date    Arthritis 06/20/2025    shoulders, thumbs    Basal cell carcinoma     Elevated PSA 06/20/2025    Erectile dysfunction     Hearing aid worn     both ears    Hearing loss     Hyperlipidemia     Hypertension     Juan Pablo Roblero, CNP    Pinched vertebral nerve 2022    L4-5    Precancerous skin lesion     Squamous cell carcinoma     Under care of team     PCP Dr Laurel Amezquita [place] last seen 4/30/25    Under care of team     Urology Dr Carl Suazo last seen 4/15/25    Under care of team     Dermatology Dr Vianca Gil

## 2025-07-02 NOTE — PROGRESS NOTES
A [facility] message has been sent to the patient for patient rounding with [facility]    Review of Systems   Constitutional: Negative.  Negative for appetite change, chills and fever.   HENT: Negative.     Eyes: Negative.  Negative for pain, redness and visual disturbance.   Respiratory: Negative.  Negative for cough, shortness of breath and wheezing.    Cardiovascular: Negative.  Negative for chest pain and leg swelling.   Gastrointestinal: Negative.  Negative for abdominal pain, nausea and vomiting.   Endocrine: Negative.    Genitourinary: Negative.  Negative for difficulty urinating, dysuria, flank pain, frequency, hematuria, testicular pain and urgency.   Musculoskeletal: Negative.  Negative for back pain, joint swelling and myalgias.   Skin: Negative.  Negative for color change, rash and wound.   Allergic/Immunologic: Negative.    Neurological: Negative.  Negative for dizziness, tremors, weakness, numbness and headaches.   Hematological: Negative.  Negative for adenopathy. Does not bruise/bleed easily.   Psychiatric/Behavioral: Negative.

## 2025-09-03 DIAGNOSIS — N52.9 ERECTILE DYSFUNCTION, UNSPECIFIED ERECTILE DYSFUNCTION TYPE: ICD-10-CM

## 2025-09-03 RX ORDER — TADALAFIL 5 MG/1
5 TABLET ORAL DAILY
Qty: 90 TABLET | Refills: 3 | Status: SHIPPED | OUTPATIENT
Start: 2025-09-03

## (undated) DEVICE — SYRINGE MED 10ML LUERLOCK TIP W/O SFTY DISP

## (undated) DEVICE — GLOVE ORTHO 7 1/2   MSG9475

## (undated) DEVICE — HYPODERMIC SAFETY NEEDLE: Brand: MAGELLAN

## (undated) DEVICE — SPINAL BIOPSY NEEDLE 22GA X 20CM: Brand: SPINAL BIOPSY NEEDLE

## (undated) DEVICE — NEEDLE BX ASPIR SPNL TIPCM MRK AND NDL STP 22GAX20CM

## (undated) DEVICE — MARKER,SKIN,WI/RULER AND LABELS: Brand: MEDLINE

## (undated) DEVICE — MAX-CORE® DISPOSABLE CORE BIOPSY INSTRUMENT, 18G X 25CM: Brand: MAX-CORE

## (undated) DEVICE — TOWEL,OR,DSP,ST,NATURAL,DLX,4/PK,20PK/CS: Brand: MEDLINE